# Patient Record
Sex: MALE | Race: WHITE | Employment: FULL TIME | URBAN - METROPOLITAN AREA
[De-identification: names, ages, dates, MRNs, and addresses within clinical notes are randomized per-mention and may not be internally consistent; named-entity substitution may affect disease eponyms.]

---

## 2022-07-11 ENCOUNTER — TELEPHONE (OUTPATIENT)
Dept: PRIMARY CARE CLINIC | Age: 62
End: 2022-07-11

## 2022-07-11 ENCOUNTER — NURSE TRIAGE (OUTPATIENT)
Dept: OTHER | Facility: CLINIC | Age: 62
End: 2022-07-11

## 2022-07-11 NOTE — TELEPHONE ENCOUNTER
Danielle Munson is looking for a PCP. Benito Farley recommended him to you and his wife also goes to you.  Will you accept?    [phone]

## 2022-07-11 NOTE — TELEPHONE ENCOUNTER
Received call from [facility]; Patient with Red Flag Complaint requesting to establish care with [facility]. Limited triage as patient is not with caller. Subjective: Caller, patient's wife, states \"He hasn't been feeling well for a couple of weeks. As far as the numbness it is in his R ring finger and pinky. He is also having some discomfort in his L hand. He is having a hard time playing the guitar. He is also having some back pain the past week. The past week he has not worked because he feels poorly all over. \"    Current Symptoms: numbness in R hand    Onset: couple weeks    Associated Symptoms: fatigue    Pain Severity: unable to provide but states he walks like he is in pain    Temperature: low grade fever    What has been tried: ibuprofen    LMP: NA Pregnant: NA    Recommended disposition: See in Office Today    Care advice provided, patient verbalizes understanding; denies any other questions or concerns; instructed to call back for any new or worsening symptoms. Patient/Caller agrees with recommended disposition; [facility] for [facility] scheduling    Attention Provider: Thank you for allowing me to participate in the care of your patient. The patient was connected to triage in response to information provided to the ECC. Please do not respond through this encounter as the response is not directed to a shared pool.       Reason for Disposition   Back pain (with neurologic deficit)    Protocols used: NEUROLOGIC DEFICIT-ADULT-OH

## 2022-07-12 ENCOUNTER — APPOINTMENT (OUTPATIENT)
Dept: CT IMAGING | Age: 62
DRG: 180 | End: 2022-07-12
Payer: COMMERCIAL

## 2022-07-12 ENCOUNTER — HOSPITAL ENCOUNTER (INPATIENT)
Age: 62
LOS: 3 days | Discharge: HOME | DRG: 180 | End: 2022-07-15
Attending: STUDENT IN AN ORGANIZED HEALTH CARE EDUCATION/TRAINING PROGRAM | Admitting: FAMILY MEDICINE
Payer: COMMERCIAL

## 2022-07-12 ENCOUNTER — OFFICE VISIT (OUTPATIENT)
Dept: FAMILY MEDICINE CLINIC | Age: 62
End: 2022-07-12
Payer: COMMERCIAL

## 2022-07-12 VITALS
SYSTOLIC BLOOD PRESSURE: 116 MMHG | OXYGEN SATURATION: 96 % | HEART RATE: 77 BPM | DIASTOLIC BLOOD PRESSURE: 72 MMHG | TEMPERATURE: 97.6 F | WEIGHT: 142.4 LBS

## 2022-07-12 DIAGNOSIS — C79.51 MALIGNANT NEOPLASM METASTATIC TO BONE (HCC): ICD-10-CM

## 2022-07-12 DIAGNOSIS — C34.92 LUNG CANCER, PRIMARY, WITH METASTASIS FROM LUNG TO OTHER SITE, LEFT (HCC): ICD-10-CM

## 2022-07-12 DIAGNOSIS — C79.9 METASTATIC MALIGNANT NEOPLASM, UNSPECIFIED SITE (HCC): Primary | ICD-10-CM

## 2022-07-12 DIAGNOSIS — R91.8 LUNG MASS: Primary | ICD-10-CM

## 2022-07-12 DIAGNOSIS — J18.9 PNEUMONIA DUE TO INFECTIOUS ORGANISM, UNSPECIFIED LATERALITY, UNSPECIFIED PART OF LUNG: ICD-10-CM

## 2022-07-12 DIAGNOSIS — R20.2 PARESTHESIAS: ICD-10-CM

## 2022-07-12 DIAGNOSIS — M54.12 CERVICAL RADICULOPATHY: ICD-10-CM

## 2022-07-12 DIAGNOSIS — M54.6 DORSALGIA OF THORACIC REGION: ICD-10-CM

## 2022-07-12 DIAGNOSIS — R06.02 SHORTNESS OF BREATH: ICD-10-CM

## 2022-07-12 LAB
ALBUMIN SERPL-MCNC: 3.6 G/DL (ref 3.5–5.2)
ALP BLD-CCNC: 203 U/L (ref 40–129)
ALT SERPL-CCNC: 40 U/L (ref 0–40)
ANION GAP SERPL CALCULATED.3IONS-SCNC: 12 MMOL/L (ref 7–16)
AST SERPL-CCNC: 33 U/L (ref 0–39)
BACTERIA: ABNORMAL /HPF
BASOPHILS ABSOLUTE: 0.12 E9/L (ref 0–0.2)
BASOPHILS RELATIVE PERCENT: 0.7 % (ref 0–2)
BILIRUB SERPL-MCNC: 0.6 MG/DL (ref 0–1.2)
BILIRUBIN URINE: NEGATIVE
BLOOD, URINE: ABNORMAL
BUN BLDV-MCNC: 19 MG/DL (ref 6–23)
CALCIUM SERPL-MCNC: 10 MG/DL (ref 8.6–10.2)
CHLORIDE BLD-SCNC: 97 MMOL/L (ref 98–107)
CLARITY: CLEAR
CO2: 27 MMOL/L (ref 22–29)
COLOR: YELLOW
CREAT SERPL-MCNC: 0.7 MG/DL (ref 0.7–1.2)
D DIMER: >5250 NG/ML DDU
EOSINOPHILS ABSOLUTE: 0.06 E9/L (ref 0.05–0.5)
EOSINOPHILS RELATIVE PERCENT: 0.4 % (ref 0–6)
EPITHELIAL CELLS, UA: ABNORMAL /HPF
GFR AFRICAN AMERICAN: >60
GFR NON-AFRICAN AMERICAN: >60 ML/MIN/1.73
GLUCOSE BLD-MCNC: 106 MG/DL (ref 74–99)
GLUCOSE URINE: 100 MG/DL
HCT VFR BLD CALC: 34.5 % (ref 37–54)
HEMOGLOBIN: 11.5 G/DL (ref 12.5–16.5)
IMMATURE GRANULOCYTES #: 0.12 E9/L
IMMATURE GRANULOCYTES %: 0.7 % (ref 0–5)
INR BLD: 1.3
KETONES, URINE: NEGATIVE MG/DL
LEUKOCYTE ESTERASE, URINE: NEGATIVE
LYMPHOCYTES ABSOLUTE: 2.43 E9/L (ref 1.5–4)
LYMPHOCYTES RELATIVE PERCENT: 14.4 % (ref 20–42)
MCH RBC QN AUTO: 29.9 PG (ref 26–35)
MCHC RBC AUTO-ENTMCNC: 33.3 % (ref 32–34.5)
MCV RBC AUTO: 89.6 FL (ref 80–99.9)
MONOCYTES ABSOLUTE: 1.49 E9/L (ref 0.1–0.95)
MONOCYTES RELATIVE PERCENT: 8.8 % (ref 2–12)
NEUTROPHILS ABSOLUTE: 12.69 E9/L (ref 1.8–7.3)
NEUTROPHILS RELATIVE PERCENT: 75 % (ref 43–80)
NITRITE, URINE: NEGATIVE
PDW BLD-RTO: 13.2 FL (ref 11.5–15)
PH UA: 6 (ref 5–9)
PLATELET # BLD: 351 E9/L (ref 130–450)
PMV BLD AUTO: 9.7 FL (ref 7–12)
POTASSIUM REFLEX MAGNESIUM: 4.2 MMOL/L (ref 3.5–5)
PROTEIN UA: NEGATIVE MG/DL
PROTHROMBIN TIME: 13.8 SEC (ref 9.3–12.4)
RBC # BLD: 3.85 E12/L (ref 3.8–5.8)
RBC UA: ABNORMAL /HPF (ref 0–2)
SODIUM BLD-SCNC: 136 MMOL/L (ref 132–146)
SPECIFIC GRAVITY UA: 1.02 (ref 1–1.03)
TOTAL PROTEIN: 7.9 G/DL (ref 6.4–8.3)
TROPONIN, HIGH SENSITIVITY: 39 NG/L (ref 0–11)
TROPONIN, HIGH SENSITIVITY: 42 NG/L (ref 0–11)
UROBILINOGEN, URINE: 1 E.U./DL
WBC # BLD: 16.9 E9/L (ref 4.5–11.5)
WBC UA: ABNORMAL /HPF (ref 0–5)

## 2022-07-12 PROCEDURE — 81001 URINALYSIS AUTO W/SCOPE: CPT

## 2022-07-12 PROCEDURE — 99285 EMERGENCY DEPT VISIT HI MDM: CPT

## 2022-07-12 PROCEDURE — 94664 DEMO&/EVAL PT USE INHALER: CPT

## 2022-07-12 PROCEDURE — 80053 COMPREHEN METABOLIC PANEL: CPT

## 2022-07-12 PROCEDURE — 2580000003 HC RX 258: Performed by: RADIOLOGY

## 2022-07-12 PROCEDURE — 6370000000 HC RX 637 (ALT 250 FOR IP): Performed by: FAMILY MEDICINE

## 2022-07-12 PROCEDURE — 71260 CT THORAX DX C+: CPT

## 2022-07-12 PROCEDURE — 6360000002 HC RX W HCPCS: Performed by: STUDENT IN AN ORGANIZED HEALTH CARE EDUCATION/TRAINING PROGRAM

## 2022-07-12 PROCEDURE — 85610 PROTHROMBIN TIME: CPT

## 2022-07-12 PROCEDURE — 99204 OFFICE O/P NEW MOD 45 MIN: CPT | Performed by: PHYSICIAN ASSISTANT

## 2022-07-12 PROCEDURE — 87040 BLOOD CULTURE FOR BACTERIA: CPT

## 2022-07-12 PROCEDURE — 6360000002 HC RX W HCPCS: Performed by: FAMILY MEDICINE

## 2022-07-12 PROCEDURE — 6360000004 HC RX CONTRAST MEDICATION: Performed by: RADIOLOGY

## 2022-07-12 PROCEDURE — A4216 STERILE WATER/SALINE, 10 ML: HCPCS | Performed by: FAMILY MEDICINE

## 2022-07-12 PROCEDURE — 74177 CT ABD & PELVIS W/CONTRAST: CPT

## 2022-07-12 PROCEDURE — 2580000003 HC RX 258: Performed by: STUDENT IN AN ORGANIZED HEALTH CARE EDUCATION/TRAINING PROGRAM

## 2022-07-12 PROCEDURE — 2580000003 HC RX 258: Performed by: FAMILY MEDICINE

## 2022-07-12 PROCEDURE — 94640 AIRWAY INHALATION TREATMENT: CPT

## 2022-07-12 PROCEDURE — 99223 1ST HOSP IP/OBS HIGH 75: CPT | Performed by: FAMILY MEDICINE

## 2022-07-12 PROCEDURE — C9113 INJ PANTOPRAZOLE SODIUM, VIA: HCPCS | Performed by: FAMILY MEDICINE

## 2022-07-12 PROCEDURE — 93005 ELECTROCARDIOGRAM TRACING: CPT | Performed by: PHYSICIAN ASSISTANT

## 2022-07-12 PROCEDURE — 84484 ASSAY OF TROPONIN QUANT: CPT

## 2022-07-12 PROCEDURE — 2060000000 HC ICU INTERMEDIATE R&B

## 2022-07-12 PROCEDURE — 85025 COMPLETE CBC W/AUTO DIFF WBC: CPT

## 2022-07-12 PROCEDURE — 85378 FIBRIN DEGRADE SEMIQUANT: CPT

## 2022-07-12 PROCEDURE — 6370000000 HC RX 637 (ALT 250 FOR IP): Performed by: STUDENT IN AN ORGANIZED HEALTH CARE EDUCATION/TRAINING PROGRAM

## 2022-07-12 RX ORDER — DOXYCYCLINE HYCLATE 100 MG/1
100 CAPSULE ORAL ONCE
Status: COMPLETED | OUTPATIENT
Start: 2022-07-12 | End: 2022-07-12

## 2022-07-12 RX ORDER — ONDANSETRON 2 MG/ML
4 INJECTION INTRAMUSCULAR; INTRAVENOUS EVERY 6 HOURS PRN
Status: DISCONTINUED | OUTPATIENT
Start: 2022-07-12 | End: 2022-07-15 | Stop reason: HOSPADM

## 2022-07-12 RX ORDER — SODIUM CHLORIDE 9 MG/ML
INJECTION, SOLUTION INTRAVENOUS PRN
Status: DISCONTINUED | OUTPATIENT
Start: 2022-07-12 | End: 2022-07-15 | Stop reason: HOSPADM

## 2022-07-12 RX ORDER — ACETAMINOPHEN 650 MG/1
650 SUPPOSITORY RECTAL EVERY 6 HOURS PRN
Status: DISCONTINUED | OUTPATIENT
Start: 2022-07-12 | End: 2022-07-15 | Stop reason: HOSPADM

## 2022-07-12 RX ORDER — HEPARIN SODIUM 10000 [USP'U]/ML
5000 INJECTION, SOLUTION INTRAVENOUS; SUBCUTANEOUS EVERY 8 HOURS
Status: DISCONTINUED | OUTPATIENT
Start: 2022-07-12 | End: 2022-07-15 | Stop reason: HOSPADM

## 2022-07-12 RX ORDER — IPRATROPIUM BROMIDE AND ALBUTEROL SULFATE 2.5; .5 MG/3ML; MG/3ML
1 SOLUTION RESPIRATORY (INHALATION)
Status: DISCONTINUED | OUTPATIENT
Start: 2022-07-12 | End: 2022-07-15 | Stop reason: HOSPADM

## 2022-07-12 RX ORDER — SODIUM CHLORIDE 0.9 % (FLUSH) 0.9 %
10 SYRINGE (ML) INJECTION PRN
Status: COMPLETED | OUTPATIENT
Start: 2022-07-12 | End: 2022-07-12

## 2022-07-12 RX ORDER — POLYETHYLENE GLYCOL 3350 17 G/17G
17 POWDER, FOR SOLUTION ORAL DAILY PRN
Status: DISCONTINUED | OUTPATIENT
Start: 2022-07-12 | End: 2022-07-15 | Stop reason: HOSPADM

## 2022-07-12 RX ORDER — ONDANSETRON 4 MG/1
4 TABLET, ORALLY DISINTEGRATING ORAL EVERY 8 HOURS PRN
Status: DISCONTINUED | OUTPATIENT
Start: 2022-07-12 | End: 2022-07-15 | Stop reason: HOSPADM

## 2022-07-12 RX ORDER — SODIUM CHLORIDE 0.9 % (FLUSH) 0.9 %
5-40 SYRINGE (ML) INJECTION EVERY 12 HOURS SCHEDULED
Status: DISCONTINUED | OUTPATIENT
Start: 2022-07-12 | End: 2022-07-15 | Stop reason: HOSPADM

## 2022-07-12 RX ORDER — LANOLIN ALCOHOL/MO/W.PET/CERES
3 CREAM (GRAM) TOPICAL NIGHTLY PRN
Status: DISCONTINUED | OUTPATIENT
Start: 2022-07-12 | End: 2022-07-15 | Stop reason: HOSPADM

## 2022-07-12 RX ORDER — MORPHINE SULFATE 2 MG/ML
2 INJECTION, SOLUTION INTRAMUSCULAR; INTRAVENOUS EVERY 4 HOURS PRN
Status: DISCONTINUED | OUTPATIENT
Start: 2022-07-12 | End: 2022-07-15 | Stop reason: HOSPADM

## 2022-07-12 RX ORDER — SODIUM CHLORIDE 0.9 % (FLUSH) 0.9 %
5-40 SYRINGE (ML) INJECTION PRN
Status: DISCONTINUED | OUTPATIENT
Start: 2022-07-12 | End: 2022-07-15 | Stop reason: HOSPADM

## 2022-07-12 RX ORDER — DOXYCYCLINE HYCLATE 100 MG/1
CAPSULE ORAL
Status: DISPENSED
Start: 2022-07-12 | End: 2022-07-13

## 2022-07-12 RX ORDER — ACETAMINOPHEN 325 MG/1
650 TABLET ORAL ONCE
Status: COMPLETED | OUTPATIENT
Start: 2022-07-12 | End: 2022-07-12

## 2022-07-12 RX ORDER — NICOTINE 21 MG/24HR
1 PATCH, TRANSDERMAL 24 HOURS TRANSDERMAL DAILY
Status: DISCONTINUED | OUTPATIENT
Start: 2022-07-12 | End: 2022-07-15 | Stop reason: HOSPADM

## 2022-07-12 RX ORDER — IBUPROFEN 800 MG/1
800 TABLET ORAL EVERY 6 HOURS PRN
COMMUNITY
End: 2022-07-15

## 2022-07-12 RX ORDER — ACETAMINOPHEN 325 MG/1
650 TABLET ORAL EVERY 6 HOURS PRN
Status: DISCONTINUED | OUTPATIENT
Start: 2022-07-12 | End: 2022-07-15 | Stop reason: HOSPADM

## 2022-07-12 RX ADMIN — IOPAMIDOL 50 ML: 755 INJECTION, SOLUTION INTRAVENOUS at 14:20

## 2022-07-12 RX ADMIN — Medication 10 ML: at 21:51

## 2022-07-12 RX ADMIN — Medication 3 MG: at 21:51

## 2022-07-12 RX ADMIN — SODIUM CHLORIDE, PRESERVATIVE FREE 10 ML: 5 INJECTION INTRAVENOUS at 14:17

## 2022-07-12 RX ADMIN — SODIUM CHLORIDE 40 MG: 9 INJECTION, SOLUTION INTRAMUSCULAR; INTRAVENOUS; SUBCUTANEOUS at 19:51

## 2022-07-12 RX ADMIN — IPRATROPIUM BROMIDE AND ALBUTEROL SULFATE 1 AMPULE: .5; 2.5 SOLUTION RESPIRATORY (INHALATION) at 19:27

## 2022-07-12 RX ADMIN — ACETAMINOPHEN 650 MG: 325 TABLET ORAL at 16:16

## 2022-07-12 RX ADMIN — DOXYCYCLINE HYCLATE 100 MG: 100 CAPSULE ORAL at 19:28

## 2022-07-12 RX ADMIN — WATER 1000 MG: 1 INJECTION INTRAMUSCULAR; INTRAVENOUS; SUBCUTANEOUS at 19:29

## 2022-07-12 ASSESSMENT — PAIN SCALES - GENERAL
PAINLEVEL_OUTOF10: 3
PAINLEVEL_OUTOF10: 0

## 2022-07-12 ASSESSMENT — PAIN DESCRIPTION - DESCRIPTORS: DESCRIPTORS: ACHING;DISCOMFORT

## 2022-07-12 ASSESSMENT — PAIN DESCRIPTION - LOCATION: LOCATION: BACK

## 2022-07-12 ASSESSMENT — PAIN DESCRIPTION - ORIENTATION: ORIENTATION: RIGHT;LEFT;MID
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
## 2022-07-12 NOTE — ED NOTES
Department of Emergency Medicine  FIRST PROVIDER TRIAGE NOTE             Independent MLP           7/12/22  9:40 AM EDT    Date of Encounter: 7/12/22   MRN: [id number]      HPI: Jossy Marsh is a 62 y.o. male who presents to the ED for Back Pain (urgent care found mass in lung on xray, sent from urgent care)   Patient reporting to the ED for upper back pain x3-4 weeks. Was evaluated at urgent care and noted a possible mass on chest XR. Recommended to ED for CT chest. No CP/SOB. ROS: Negative for cp or sob. PE: Gen Appearance/Constitutional: alert  CV: regular rate  Pulm: CTA bilat     Initial Plan of Care: All treatment areas with department are currently occupied. Plan to order/Initiate the following while awaiting opening in ED: labs, EKG and imaging studies.   Initiate Treatment-Testing, Proceed toTreatment Area When Bed Available for ED Attending/MLP to Continue Care    Electronically signed by SARY Patel   DD: 7/12/22         Rachel Patel  07/12/22 1136

## 2022-07-12 NOTE — ED PROVIDER NOTES
Department of Emergency Medicine   ED  Provider Note  Admit Date/RoomTime: 7/12/2022 12:31 PM  ED Room: [facility] [address]          History of Present Illness:  7/12/22, Time: 4:42 PM EDT  Chief Complaint   Patient presents with    Back Pain     urgent care found mass in lung on xray, sent from urgent care       Jacob Prasad is a 62 y.o. male presenting to the ED for CAT scan. Patient is presenting from urgent care. He went to urgent care for back pain which she has had intermittently for the past 6 to 7 weeks. Is an achy sensation and involving his entire back. Certain movements worsen it nothing seems to improve it. He denies any trauma. No numbness, tingling, weakness, incontinence or history of IV drug use. Patient denies any chest pain. He is chronically short of breath with exertion but denies any current shortness of breath. No fevers. Nonproductive cough. No abdominal pain, nausea or vomiting. Patient does admit he is a tobacco smoker. Review of Systems:   Constitutional symptoms: Denies fever  Eyes: Denies eye pain  Ears, Nose, Mouth, Throat: Denies ear pain  Cardiovascular: Denies chest pain  Respiratory: Positive for chronic shortness of breath  Gastrointestinal: Denies blood per rectum  Genitourinary: Denies hematuria  Skin: Denies rashes  Neurological: Denies headache  Musculoskeletal: Positive for back pain    --------------------------------------------- PAST HISTORY ---------------------------------------------  Past Medical History:  has no past medical history on file. Past Surgical History:  has no past surgical history on file. Social History:  reports that he has been smoking cigarettes. He has been smoking about 0.75 packs per day. He has never used smokeless tobacco. He reports current alcohol use of about 1.0 standard drink of alcohol per week. He reports that he does not use drugs. Family History: family history is not on file. . Unless otherwise noted, family history is non contributory    The patients home medications have been reviewed. Allergies: Patient has no known allergies. I have reviewed the past medical history, past surgical history, social history, and family history    ---------------------------------------------------PHYSICAL EXAM--------------------------------------    General: The patient is conversational and lying comfortably in bed. Head: Normocephalic and atraumatic. Eyes: Sclera non-icteric and no conjunctival injection  ENT: Nasal and oral membranes moist  Neck: Trachea midline. No JVD  Respiratory: Lungs clear to auscultation bilaterally. Patient speaking in full sentences. Cardiovascular: Regular rate. No pedal edema. 2+ DP pulses. 2+ radial pulses  Gastrointestinal:  Abdomen is soft and nondistended. Bowel sounds present. There is no tenderness. There is no guarding or rebound. Musculoskeletal: No deformity. No tenderness to midline spine. No step-offs or deformities. Skin: Skin warm and dry. No rashes. Neurologic: No gross motor deficits. No aphasia. Symmetric move the extremities. Sensation intact. Psychiatric: Normal affect. Not responding to internal stimuli    -------------------------------------------------- RESULTS -------------------------------------------------  I have personally reviewed all laboratory and imaging results for this patient. Results are listed below.      LABS: (Lab results interpreted by me)  Results for orders placed or performed during the hospital encounter of 07/12/22   CBC with Auto Differential   Result Value Ref Range    WBC 16.9 (H) 4.5 - 11.5 E9/L    RBC 3.85 3.80 - 5.80 E12/L    Hemoglobin 11.5 (L) 12.5 - 16.5 g/dL    Hematocrit 34.5 (L) 37.0 - 54.0 %    MCV 89.6 80.0 - 99.9 fL    MCH 29.9 26.0 - 35.0 pg    MCHC 33.3 32.0 - 34.5 %    RDW 13.2 11.5 - 15.0 fL    Platelets 604 777 - 416 E9/L    MPV 9.7 7.0 - 12.0 fL    Neutrophils % 75.0 43.0 - 80.0 %    Immature Granulocytes % 0.7 0.0 - 5.0 %    Lymphocytes % 14.4 (L) 20.0 - 42.0 %    Monocytes % 8.8 2.0 - 12.0 %    Eosinophils % 0.4 0.0 - 6.0 %    Basophils % 0.7 0.0 - 2.0 %    Neutrophils Absolute 12.69 (H) 1.80 - 7.30 E9/L    Immature Granulocytes # 0.12 E9/L    Lymphocytes Absolute 2.43 1.50 - 4.00 E9/L    Monocytes Absolute 1.49 (H) 0.10 - 0.95 E9/L    Eosinophils Absolute 0.06 0.05 - 0.50 E9/L    Basophils Absolute 0.12 0.00 - 0.20 E9/L   Comprehensive Metabolic Panel w/ Reflex to MG   Result Value Ref Range    Sodium 136 132 - 146 mmol/L    Potassium reflex Magnesium 4.2 3.5 - 5.0 mmol/L    Chloride 97 (L) 98 - 107 mmol/L    CO2 27 22 - 29 mmol/L    Anion Gap 12 7 - 16 mmol/L    Glucose 106 (H) 74 - 99 mg/dL    BUN 19 6 - 23 mg/dL    CREATININE 0.7 0.7 - 1.2 mg/dL    GFR Non-African American >60 >=60 mL/min/1.73    GFR African American >60     Calcium 10.0 8.6 - 10.2 mg/dL    Total Protein 7.9 6.4 - 8.3 g/dL    Albumin 3.6 3.5 - 5.2 g/dL    Total Bilirubin 0.6 0.0 - 1.2 mg/dL    Alkaline Phosphatase 203 (H) 40 - 129 U/L    ALT 40 0 - 40 U/L    AST 33 0 - 39 U/L   Troponin   Result Value Ref Range    Troponin, High Sensitivity 42 (H) 0 - 11 ng/L   Troponin   Result Value Ref Range    Troponin, High Sensitivity 39 (H) 0 - 11 ng/L   EKG 12 Lead   Result Value Ref Range    Ventricular Rate 67 BPM    Atrial Rate 67 BPM    P-R Interval 124 ms    QRS Duration 100 ms    Q-T Interval 404 ms    QTc Calculation (Bazett) 426 ms    P Axis 14 degrees    R Axis -46 degrees    T Axis 20 degrees   ,     RADIOLOGY:  Interpreted by Radiologist unless otherwise specified  CT CHEST W CONTRAST   Final Result   1. Large, necrotic soft tissue mass in the right lower lobe, which also   encompasses the inferior right hilum, consistent with malignancy. 2.  Metastatic mediastinal and right hilar lymphadenopathy. 3.  Tiny satellite nodules in the right lower lobe, posterior to the   previously described mass.       4.  Multitude of tiny centrilobular nodules and density suspicious for   tree-in-bud opacities in the right middle lobe. Similar, but much less   significant findings are seen in the superior segment of the right lower   lobe. Question infective bronchiolitis or other inflammatory disorder. Tumoral spread cannot be excluded. 5.  Bilateral lung nodules, as described above. It cannot be determined if   these nodules are neoplastic or inflammatory. 6.  Centrilobular emphysema. 7.  Abdominal findings discussed in full detail on the report from the   patient's CT scan of the abdomen and pelvis performed the same day. Please   refer to that transcription. 8.  Osseous metastatic disease. CT ABDOMEN PELVIS W IV CONTRAST Additional Contrast? None   Final Result   1. Large, necrotic soft tissue mass in the right lower lobe of the lungs,   which in Compazine is the inferior right hilum. This is described in full   detail on the report from the patient's CT scan of the chest performed the   same day. Please refer to that transcription. 2.  Liver metastases. 3.  A splenic infarct. 4.  Bilateral adrenal gland lesions, which may represent adenomata. Metastatic disease cannot be excluded. MRI of the adrenal glands can be   considered for further evaluation. 5.  Bilateral renal cortical cysts. 6.  Hypodense areas involving both renal parenchyma, left greater than right. These could represent bilateral infarcts without cortical rim sign, however,   pyelonephritis cannot be excluded. Metastatic disease is also in the   differential diagnosis. Clinical correlation is recommended. 7.  Colonic diverticulosis. 8.  Enlarged prostate gland. 9.  Mildly enlarged gastrohepatic lymph node.       10.  Osseous metastases, as described above.             ------------------------- NURSING NOTES AND VITALS REVIEWED ---------------------------   The nursing notes within the ED encounter and vital signs as below have been reviewed by myself  BP 92/76   Pulse 79   Temp 97.3 °F (36.3 °C)   Resp 20   Ht 5' 9\" (1.753 m)   Wt 143 lb (64.9 kg)   SpO2 96%   BMI 21.12 kg/m²     Oxygen Saturation Interpretation: Normal    The patients available past medical records and past encounters were reviewed. ------------------------------ ED COURSE/MEDICAL DECISION MAKING----------------------  Medications   cefTRIAXone (ROCEPHIN) 1,000 mg in sterile water 10 mL IV syringe (has no administration in time range)   doxycycline hyclate (VIBRAMYCIN) capsule 100 mg (has no administration in time range)   acetaminophen (TYLENOL) tablet 650 mg (650 mg Oral Given 7/12/22 1616)   iopamidol (ISOVUE-370) 76 % injection 50 mL (50 mLs IntraVENous Given 7/12/22 1420)   sodium chloride flush 0.9 % injection 10 mL (10 mLs IntraVENous Given 7/12/22 1417)       Medical Decision Making: This is a 62 y.o. male presenting to the ED for possible CAT scan. On initial presentation, the patient's vital signs are interpreted as  Hypertensive, afebrile and saturating well on room air. Based on history and physical exam, we have a broad differential.  Patient's back pain may be secondary to musculoskeletal strain or metastasis. He has no midline tenderness to palpation. No history of trauma and is distally neurovascularly intact. With the findings of prior lung mass we will assess for evaluation of this. With his smoking history and concern for lung cancer. Laboratory studies, EKG, CT of the chest and CT abdomen pelvis obtained. A 12-lead EKG was performed and interpreted by myself. The rate is 67 with normal sinus rhythm and left axis deviation. Patient has a left anterior fascicular block the HI interval is 124, QRS interval is 100, and QTC interval is 426. No acute ST elevation. T wave inversion in lead III. This is sinus rhythm with left anterior fascicular block and nonspecific abnormality. Laboratory studies show hypochloremia. Troponin elevated at 42. This will be repeated. Alkaline phosphatase increased. Patient has a leukocytosis and anemia. No prior to compare this to. CT scan shows large necrotic mass of the right lower lobe consistent with malignancy. Multiple metastatic lesions. Patient does have some opacities noted. Lung nodules. Emphysema. Liver metastasis. Splenic infarcts. Multiple other lesions. Diverticulosis. Osseous metastasis. This is interpreted by radiology. I had an extensive conversation with both the patient and his wife. They are aware of the findings. They are agreeable to admission for further evaluation. Patient does not currently feel short of breath. He does not have a primary care physician. [facility] hospitalist contacted and I spoke with Dr. Hanna Donnelly who accepted the admission. With the patient's leukocytosis and evidence of possible opacities he is started on broad-spectrum antibiotics for coverage of community-acquired pneumonia. This patient's ED course included: a personal history and physicial examination and re-evaluation prior to disposition    This patient has remained hemodynamically stable during their ED course. Consultations:  Internal medicine    The cardiac monitor revealed sinus rhythm with a heart rate in the 70s as interpreted by me. The cardiac monitor was ordered secondary to the patient's shortness of breath and to monitor the patient for dysrhythmia. CPT [id number]    Oxygen Saturation Interpretation: 96 % on room air. Normal    Counseling:   I have spoken with the patient and discussed todays results, in addition to providing specific details for the plan of care and counseling regarding the diagnosis and prognosis. Questions are answered at this time and they are agreeable with the plan.     --------------------------------- IMPRESSION AND DISPOSITION ---------------------------------    IMPRESSION  1.  [facility] 2. Pneumonia due to infectious organism, unspecified laterality, unspecified part of lung    3. Shortness of breath        DISPOSITION  Disposition: Admit to telemetry  Patient condition is fair    I, Dr. Quinn Vega, am the primary provider of record    NOTE: This report was transcribed using voice recognition software.  Every effort was made to ensure accuracy; however, inadvertent computerized transcription errors may be present        Quinn Vega MD  07/12/22 7167

## 2022-07-12 NOTE — PROGRESS NOTES
22  Yari Stringer : 1960 Sex: male  Age 62 y.o. Subjective:  Chief Complaint   Patient presents with    Hand Pain     left hand pain and numbness for the past 3 weeks    Back Pain     mid and upper back         HPI:   Yari Stringer , 62 y.o. male presents to express care for evaluation of hand pain, paresthesias, numbness    HPI  71-year-old male presents to express care for evaluation of back pain, hand pain. The patient has had the symptoms ongoing for 8 to 10 years but recently in the last couple of weeks he has noted these paresthesias to the left hand and the ring finger and little finger. The patient states that it is more of a numb sensation. At first it was waxing waning and now seems to be more of a constant. The patient states that he does have some arthritic changes to his hands. He is right-hand dominant. The patient is a . He is not been able to work for the last 2 weeks. Additionally the patient does note night sweats. The patient has lost about 30 pounds in the last 1 year with most significant weight loss here in the last 1 month about 10 pounds. The patient has had low-grade fevers every night. The patient is not having any abdominal pain. The patient does have a history of tobacco abuse and smokes about three quarters of a pack of cigarettes daily        ROS:   Unless otherwise stated in this report the patient's positive and negative responses for review of systems for constitutional, eyes, ENT, cardiovascular, respiratory, gastrointestinal, neurological, , musculoskeletal, and integument systems and related systems to the presenting problem are either stated in the history of present illness or were not pertinent or were negative for the symptoms and/or complaints related to the presenting medical problem. Positives and pertinent negatives as per HPI. All others reviewed and are negative. PMH:   History reviewed.  No pertinent past medical history. History reviewed. No pertinent surgical history. History reviewed. No pertinent family history. Medications:   No current outpatient medications on file. Allergies:   No Known Allergies    Social History:     Social History     Tobacco Use    Smoking status: Current Every Day Smoker     Packs/day: 0.75     Types: Cigarettes    Smokeless tobacco: Never Used   Substance Use Topics    Alcohol use: Yes     Alcohol/week: 1.0 standard drink     Types: 1 Standard drinks or equivalent per week    Drug use: Never       Patient lives at home. Physical Exam:     Vitals:    07/12/22 0819   BP: 116/72   Site: Right Upper Arm   Position: Sitting   Pulse: 77   Temp: 97.6 °F (36.4 °C)   TempSrc: Temporal   SpO2: 96%   Weight: 142 lb 6.4 oz (64.6 kg)       Exam:  Physical Exam  Nurses note and vital signs reviewed and patient is not hypoxic. General: The patient appears well and in no apparent distress. Patient is resting comfortably on cart. Skin: Warm, dry, no pallor noted. There is no rash noted. Head: Normocephalic, atraumatic. Eye: Normal conjunctiva  Ears, Nose, Mouth, and Throat: Oral mucosa is moist  Cardiovascular: Regular Rate and Rhythm  Respiratory: Patient is in no distress, no accessory muscle use, lungs are clear to auscultation, no wheezing, crackles or rhonchi  Back: Non-tender, no CVA tenderness bilaterally to percussion. GI: Normal bowel sounds, no tenderness to palpation, no masses appreciated. No rebound, guarding, or rigidity noted. Musculoskeletal: The patient has no evidence of calf tenderness, no pitting edema, symmetrical pulses noted bilaterally  Neurological: A&O x4, normal speech      Testing:     XR CHEST STANDARD (2 VW)    Result Date: 7/12/2022  EXAMINATION: THREE XRAY VIEWS OF THE THORACIC SPINE; 2 XRAY VIEWS OF THE CERVICAL SPINE; TWO XRAY VIEWS OF THE CHEST 7/12/2022 7:41 am COMPARISON: None.  HISTORY: ORDERING SYSTEM PROVIDED HISTORY: Dorsalgia of thoracic region TECHNOLOGIST PROVIDED HISTORY: Reason for exam:->mid back pain; ORDERING SYSTEM PROVIDED HISTORY: Cervical radiculopathy TECHNOLOGIST PROVIDED HISTORY: Reason for exam:->back pain, neck pain radiculopathy; ORDERING SYSTEM PROVIDED HISTORY: Dorsalgia of thoracic region TECHNOLOGIST PROVIDED HISTORY: Reason for exam:->mid back pain for 2-3 weeks pain down left arm FINDINGS: Chest: A mass measuring approximately 5 cm in diameter is located posterior to the right pulmonary hilum and is highly suspicious for neoplasm. Contrast-enhanced CT of the chest is recommended for further evaluation. Normal heart and pulmonary vascularity. Neither costophrenic angle is blunted. C-spine: Moderate C6-7 degenerative disc disease. No fracture or dislocation. Normal soft tissues. T-spine: Moderate multilevel degenerative disc disease. No fracture or dislocation. Right lung mass projecting posterior to the right pulmonary hilum which is quite suspicious for underlying neoplasm. Contrast-enhanced CT of the chest is recommended. Degenerative cervical and thoracic spondylosis. XR CERVICAL SPINE (2-3 VIEWS)    Result Date: 7/12/2022  EXAMINATION: THREE XRAY VIEWS OF THE THORACIC SPINE; 2 XRAY VIEWS OF THE CERVICAL SPINE; TWO XRAY VIEWS OF THE CHEST 7/12/2022 7:41 am COMPARISON: None. HISTORY: ORDERING SYSTEM PROVIDED HISTORY: Dorsalgia of thoracic region TECHNOLOGIST PROVIDED HISTORY: Reason for exam:->mid back pain; ORDERING SYSTEM PROVIDED HISTORY: Cervical radiculopathy TECHNOLOGIST PROVIDED HISTORY: Reason for exam:->back pain, neck pain radiculopathy; ORDERING SYSTEM PROVIDED HISTORY: Dorsalgia of thoracic region TECHNOLOGIST PROVIDED HISTORY: Reason for exam:->mid back pain for 2-3 weeks pain down left arm FINDINGS: Chest: A mass measuring approximately 5 cm in diameter is located posterior to the right pulmonary hilum and is highly suspicious for neoplasm.  Contrast-enhanced CT of the chest is recommended for further evaluation. Normal heart and pulmonary vascularity. Neither costophrenic angle is blunted. C-spine: Moderate C6-7 degenerative disc disease. No fracture or dislocation. Normal soft tissues. T-spine: Moderate multilevel degenerative disc disease. No fracture or dislocation. Right lung mass projecting posterior to the right pulmonary hilum which is quite suspicious for underlying neoplasm. Contrast-enhanced CT of the chest is recommended. Degenerative cervical and thoracic spondylosis. XR THORACIC SPINE (3 VIEWS)    Result Date: 7/12/2022  EXAMINATION: THREE XRAY VIEWS OF THE THORACIC SPINE; 2 XRAY VIEWS OF THE CERVICAL SPINE; TWO XRAY VIEWS OF THE CHEST 7/12/2022 7:41 am COMPARISON: None. HISTORY: ORDERING SYSTEM PROVIDED HISTORY: Dorsalgia of thoracic region TECHNOLOGIST PROVIDED HISTORY: Reason for exam:->mid back pain; ORDERING SYSTEM PROVIDED HISTORY: Cervical radiculopathy TECHNOLOGIST PROVIDED HISTORY: Reason for exam:->back pain, neck pain radiculopathy; ORDERING SYSTEM PROVIDED HISTORY: Dorsalgia of thoracic region TECHNOLOGIST PROVIDED HISTORY: Reason for exam:->mid back pain for 2-3 weeks pain down left arm FINDINGS: Chest: A mass measuring approximately 5 cm in diameter is located posterior to the right pulmonary hilum and is highly suspicious for neoplasm. Contrast-enhanced CT of the chest is recommended for further evaluation. Normal heart and pulmonary vascularity. Neither costophrenic angle is blunted. C-spine: Moderate C6-7 degenerative disc disease. No fracture or dislocation. Normal soft tissues. T-spine: Moderate multilevel degenerative disc disease. No fracture or dislocation. Right lung mass projecting posterior to the right pulmonary hilum which is quite suspicious for underlying neoplasm. Contrast-enhanced CT of the chest is recommended. Degenerative cervical and thoracic spondylosis.          Medical Decision Making:     Vital signs reviewed    Past medical history reviewed. Allergies reviewed. Medications reviewed. Patient on arrival does not appear to be in any apparent distress or discomfort. The patient has been seen and evaluated. The patient does not appear to be toxic or lethargic. The patient had x-rays obtained that show a right lung mass projecting posterior to the right pulmonary hilum which is quite suspicious for underlying neoplasm. Contrast enhanced CT of the chest is recommended. Degenerative cervical and thoracic spondylosis. Given the patient's signs and symptoms we will send the patient to the emergency department. Likely CT of the chest as well as brain and possibly C and T-spine. The patient does need close follow-up. The patient has an appointment to establish with a PCP on 7/27/2022. The patient will be directed to the emergency department for further evaluation      Clinical Impression:   Juan Lynch was seen today for hand pain and back pain. Diagnoses and all orders for this visit:    Lung mass    Cervical radiculopathy  -     XR CERVICAL SPINE (2-3 VIEWS); Future    Dorsalgia of thoracic region  -     XR CERVICAL SPINE (2-3 VIEWS); Future  -     XR THORACIC SPINE (3 VIEWS); Future  -     XR CHEST STANDARD (2 VW);  Future    Paresthesias        Go directly to the ED    SIGNATURE: Christa Fleischer III, PA-C

## 2022-07-12 NOTE — ED NOTES
Pt accidentally dropped doxy onto floor; another doxy was removed from omnicell; total of 100 mg administered to Pt     Jie Bryant RN  07/12/22 1997

## 2022-07-13 ENCOUNTER — APPOINTMENT (OUTPATIENT)
Dept: ULTRASOUND IMAGING | Age: 62
DRG: 180 | End: 2022-07-13
Payer: COMMERCIAL

## 2022-07-13 ENCOUNTER — APPOINTMENT (OUTPATIENT)
Dept: MRI IMAGING | Age: 62
DRG: 180 | End: 2022-07-13
Payer: COMMERCIAL

## 2022-07-13 PROBLEM — M54.9 BACK PAIN: Status: ACTIVE | Noted: 2022-07-13

## 2022-07-13 LAB
ADENOVIRUS BY PCR: NOT DETECTED
ALBUMIN SERPL-MCNC: 2.9 G/DL (ref 3.5–5.2)
ALP BLD-CCNC: 164 U/L (ref 40–129)
ALT SERPL-CCNC: 32 U/L (ref 0–40)
ANION GAP SERPL CALCULATED.3IONS-SCNC: 11 MMOL/L (ref 7–16)
AST SERPL-CCNC: 27 U/L (ref 0–39)
BASOPHILS ABSOLUTE: 0.08 E9/L (ref 0–0.2)
BASOPHILS RELATIVE PERCENT: 0.5 % (ref 0–2)
BILIRUB SERPL-MCNC: 0.5 MG/DL (ref 0–1.2)
BORDETELLA PARAPERTUSSIS BY PCR: NOT DETECTED
BORDETELLA PERTUSSIS BY PCR: NOT DETECTED
BUN BLDV-MCNC: 21 MG/DL (ref 6–23)
C-REACTIVE PROTEIN: 13.2 MG/DL (ref 0–0.4)
CALCIUM SERPL-MCNC: 9.4 MG/DL (ref 8.6–10.2)
CHLAMYDOPHILIA PNEUMONIAE BY PCR: NOT DETECTED
CHLORIDE BLD-SCNC: 98 MMOL/L (ref 98–107)
CO2: 24 MMOL/L (ref 22–29)
CORONAVIRUS 229E BY PCR: NOT DETECTED
CORONAVIRUS HKU1 BY PCR: NOT DETECTED
CORONAVIRUS NL63 BY PCR: NOT DETECTED
CORONAVIRUS OC43 BY PCR: NOT DETECTED
CREAT SERPL-MCNC: 0.8 MG/DL (ref 0.7–1.2)
EKG ATRIAL RATE: 67 BPM
EKG P AXIS: 14 DEGREES
EKG P-R INTERVAL: 124 MS
EKG Q-T INTERVAL: 404 MS
EKG QRS DURATION: 100 MS
EKG QTC CALCULATION (BAZETT): 426 MS
EKG R AXIS: -46 DEGREES
EKG T AXIS: 20 DEGREES
EKG VENTRICULAR RATE: 67 BPM
EOSINOPHILS ABSOLUTE: 0.1 E9/L (ref 0.05–0.5)
EOSINOPHILS RELATIVE PERCENT: 0.6 % (ref 0–6)
FOLATE: 11.5 NG/ML (ref 4.8–24.2)
GFR AFRICAN AMERICAN: >60
GFR NON-AFRICAN AMERICAN: >60 ML/MIN/1.73
GLUCOSE BLD-MCNC: 127 MG/DL (ref 74–99)
HBA1C MFR BLD: 5.4 % (ref 4–5.6)
HCT VFR BLD CALC: 27.7 % (ref 37–54)
HEMOGLOBIN: 9.4 G/DL (ref 12.5–16.5)
HUMAN METAPNEUMOVIRUS BY PCR: NOT DETECTED
HUMAN RHINOVIRUS/ENTEROVIRUS BY PCR: NOT DETECTED
IMMATURE GRANULOCYTES #: 0.16 E9/L
IMMATURE GRANULOCYTES %: 1 % (ref 0–5)
INFLUENZA A BY PCR: NOT DETECTED
INFLUENZA B BY PCR: NOT DETECTED
L. PNEUMOPHILA SEROGP 1 UR AG: NORMAL
LV EF: 58 %
LVEF MODALITY: NORMAL
LYMPHOCYTES ABSOLUTE: 2.53 E9/L (ref 1.5–4)
LYMPHOCYTES RELATIVE PERCENT: 15.5 % (ref 20–42)
MAGNESIUM: 1.9 MG/DL (ref 1.6–2.6)
MCH RBC QN AUTO: 30.4 PG (ref 26–35)
MCHC RBC AUTO-ENTMCNC: 33.9 % (ref 32–34.5)
MCV RBC AUTO: 89.6 FL (ref 80–99.9)
MONOCYTES ABSOLUTE: 1.63 E9/L (ref 0.1–0.95)
MONOCYTES RELATIVE PERCENT: 10 % (ref 2–12)
MYCOPLASMA PNEUMONIAE BY PCR: NOT DETECTED
NEUTROPHILS ABSOLUTE: 11.81 E9/L (ref 1.8–7.3)
NEUTROPHILS RELATIVE PERCENT: 72.4 % (ref 43–80)
PARAINFLUENZA VIRUS 1 BY PCR: NOT DETECTED
PARAINFLUENZA VIRUS 2 BY PCR: NOT DETECTED
PARAINFLUENZA VIRUS 3 BY PCR: NOT DETECTED
PARAINFLUENZA VIRUS 4 BY PCR: NOT DETECTED
PDW BLD-RTO: 13.3 FL (ref 11.5–15)
PHOSPHORUS: 3.1 MG/DL (ref 2.5–4.5)
PLATELET # BLD: 295 E9/L (ref 130–450)
PMV BLD AUTO: 10.1 FL (ref 7–12)
POTASSIUM REFLEX MAGNESIUM: 4.3 MMOL/L (ref 3.5–5)
PROCALCITONIN: 0.2 NG/ML (ref 0–0.08)
RBC # BLD: 3.09 E12/L (ref 3.8–5.8)
RBC # BLD: NORMAL 10*6/UL
RESPIRATORY SYNCYTIAL VIRUS BY PCR: NOT DETECTED
SARS-COV-2, PCR: NOT DETECTED
SEDIMENTATION RATE, ERYTHROCYTE: 76 MM/HR (ref 0–15)
SODIUM BLD-SCNC: 133 MMOL/L (ref 132–146)
STREP PNEUMONIAE ANTIGEN, URINE: NORMAL
TOTAL PROTEIN: 6.7 G/DL (ref 6.4–8.3)
TSH SERPL DL<=0.05 MIU/L-ACNC: 0.98 UIU/ML (ref 0.27–4.2)
VITAMIN B-12: >2000 PG/ML (ref 211–946)
WBC # BLD: 16.3 E9/L (ref 4.5–11.5)

## 2022-07-13 PROCEDURE — 84145 PROCALCITONIN (PCT): CPT

## 2022-07-13 PROCEDURE — 6370000000 HC RX 637 (ALT 250 FOR IP): Performed by: FAMILY MEDICINE

## 2022-07-13 PROCEDURE — A9577 INJ MULTIHANCE: HCPCS | Performed by: RADIOLOGY

## 2022-07-13 PROCEDURE — 83735 ASSAY OF MAGNESIUM: CPT

## 2022-07-13 PROCEDURE — 6360000002 HC RX W HCPCS: Performed by: FAMILY MEDICINE

## 2022-07-13 PROCEDURE — 86140 C-REACTIVE PROTEIN: CPT

## 2022-07-13 PROCEDURE — 93306 TTE W/DOPPLER COMPLETE: CPT

## 2022-07-13 PROCEDURE — 94640 AIRWAY INHALATION TREATMENT: CPT

## 2022-07-13 PROCEDURE — 82746 ASSAY OF FOLIC ACID SERUM: CPT

## 2022-07-13 PROCEDURE — 2060000000 HC ICU INTERMEDIATE R&B

## 2022-07-13 PROCEDURE — 36415 COLL VENOUS BLD VENIPUNCTURE: CPT

## 2022-07-13 PROCEDURE — 80053 COMPREHEN METABOLIC PANEL: CPT

## 2022-07-13 PROCEDURE — 82607 VITAMIN B-12: CPT

## 2022-07-13 PROCEDURE — 99232 SBSQ HOSP IP/OBS MODERATE 35: CPT | Performed by: NURSE PRACTITIONER

## 2022-07-13 PROCEDURE — 84100 ASSAY OF PHOSPHORUS: CPT

## 2022-07-13 PROCEDURE — 93971 EXTREMITY STUDY: CPT

## 2022-07-13 PROCEDURE — 99222 1ST HOSP IP/OBS MODERATE 55: CPT | Performed by: INTERNAL MEDICINE

## 2022-07-13 PROCEDURE — 6360000004 HC RX CONTRAST MEDICATION: Performed by: RADIOLOGY

## 2022-07-13 PROCEDURE — 83036 HEMOGLOBIN GLYCOSYLATED A1C: CPT

## 2022-07-13 PROCEDURE — 85651 RBC SED RATE NONAUTOMATED: CPT

## 2022-07-13 PROCEDURE — 85025 COMPLETE CBC W/AUTO DIFF WBC: CPT

## 2022-07-13 PROCEDURE — 0202U NFCT DS 22 TRGT SARS-COV-2: CPT

## 2022-07-13 PROCEDURE — 84443 ASSAY THYROID STIM HORMONE: CPT

## 2022-07-13 PROCEDURE — 70553 MRI BRAIN STEM W/O & W/DYE: CPT

## 2022-07-13 PROCEDURE — 2580000003 HC RX 258: Performed by: FAMILY MEDICINE

## 2022-07-13 PROCEDURE — 87449 NOS EACH ORGANISM AG IA: CPT

## 2022-07-13 RX ORDER — PANTOPRAZOLE SODIUM 40 MG/1
40 TABLET, DELAYED RELEASE ORAL
Status: DISCONTINUED | OUTPATIENT
Start: 2022-07-13 | End: 2022-07-15 | Stop reason: HOSPADM

## 2022-07-13 RX ADMIN — MORPHINE SULFATE 2 MG: 2 INJECTION, SOLUTION INTRAMUSCULAR; INTRAVENOUS at 20:21

## 2022-07-13 RX ADMIN — IPRATROPIUM BROMIDE AND ALBUTEROL SULFATE 1 AMPULE: .5; 2.5 SOLUTION RESPIRATORY (INHALATION) at 15:39

## 2022-07-13 RX ADMIN — IPRATROPIUM BROMIDE AND ALBUTEROL SULFATE 1 AMPULE: .5; 2.5 SOLUTION RESPIRATORY (INHALATION) at 19:44

## 2022-07-13 RX ADMIN — Medication 10 ML: at 11:08

## 2022-07-13 RX ADMIN — HEPARIN SODIUM 5000 UNITS: 10000 INJECTION INTRAVENOUS; SUBCUTANEOUS at 08:56

## 2022-07-13 RX ADMIN — Medication 10 ML: at 20:22

## 2022-07-13 RX ADMIN — GADOBENATE DIMEGLUMINE 13 ML: 529 INJECTION, SOLUTION INTRAVENOUS at 22:38

## 2022-07-13 RX ADMIN — PANTOPRAZOLE SODIUM 40 MG: 40 TABLET, DELAYED RELEASE ORAL at 08:57

## 2022-07-13 RX ADMIN — Medication 3 MG: at 20:21

## 2022-07-13 RX ADMIN — HEPARIN SODIUM 5000 UNITS: 10000 INJECTION INTRAVENOUS; SUBCUTANEOUS at 03:28

## 2022-07-13 RX ADMIN — IPRATROPIUM BROMIDE AND ALBUTEROL SULFATE 1 AMPULE: .5; 2.5 SOLUTION RESPIRATORY (INHALATION) at 12:26

## 2022-07-13 ASSESSMENT — PAIN SCALES - GENERAL
PAINLEVEL_OUTOF10: 0

## 2022-07-13 NOTE — PROGRESS NOTES
Arrival time: 2130  Patient admitted to room 603 from ED. Transferred from stretcher to bed  independently. Wife at bedside, who he lives with. Alert and oriented x 4. Pain level 0. PIV noted to L AC. Denies any SOB, on room air. Continent and skin intact. Oriented to room, white board, call light, and hourly rounding.

## 2022-07-13 NOTE — PROGRESS NOTES
Comprehensive Nutrition Assessment    Type and Reason for Visit:  Initial,Positive Nutrition Screen    Nutrition Recommendations/Plan:   1. Continue Regular diet and ONS, as tolerated  2. Continue inpatient monitoring     Malnutrition Assessment:  Malnutrition Status: Moderate malnutrition (07/13/22 1533)    Context:  Acute Illness     Findings of the 6 clinical characteristics of malnutrition:  Energy Intake:  No significant decrease in energy intake  Weight Loss:  Unable to assess (subjective 7% loss in last month)     Body Fat Loss:  Mild body fat loss     Muscle Mass Loss:  Mild muscle mass loss    Fluid Accumulation:  No significant fluid accumulation     Strength:  Not Performed    Nutrition Assessment:    Pt admit 2/2 lung mass with local mets seen on imaging. He has been having back pain and paresthesias UE. Pt reports 30# wt loss in last year with most recent loss 10# in last month (7%). Will add ONS to optimize nutrient intake in the setting of probable catabolic illness. Nutrition Related Findings:    [place], back pain, numbness UE, abd WDL, no edema, denied prob w/appetite or swallowing Wound Type:  (reddened chest)       Current Nutrition Intake & Therapies:    Average Meal Intake: Unable to assess (no problems reported PTA)  Average Supplements Intake: None Ordered  ADULT DIET; Regular  Diet NPO  ADULT ORAL NUTRITION SUPPLEMENT; Lunch, Dinner; Frozen Oral Supplement  ADULT ORAL NUTRITION SUPPLEMENT; Breakfast; Standard High Calorie/High Protein Oral Supplement    Anthropometric Measures:  Height: 5' 9\" (175.3 cm)  Ideal Body Weight (IBW): 160 lbs (73 kg)    Admission Body Weight: 142 lb 6.4 oz (64.6 kg) (7/12 at PCP)  Current Body Weight: 142 lb 6.4 oz (64.6 kg) (7/12 at urgent care), 89 % IBW.     Current BMI (kg/m2): 21  Usual Body Weight:  (No EMR hx actual wts but reported 30# wt loss in last year and 10# in last month (7%))                       BMI Categories: Normal Weight (BMI 18.5-24. 9)    Estimated Daily Nutrient Needs:  Energy Requirements Based On: Formula ([address])  Weight Used for Energy Requirements: Current  Energy (kcal/day):   Weight Used for Protein Requirements: Current  Protein (g/day): 85-95  Method Used for Fluid Requirements: 1 ml/kcal  Fluid (ml/day):     Nutrition Diagnosis:   · Moderate malnutrition,In context of acute illness or injury related to catabolic illness as evidenced by Criteria as identified in malnutrition assessment      Nutrition Interventions:   Food and/or Nutrient Delivery: Continue Current Diet,Start Oral Nutrition Supplement (Magic Cup BID, Ensure Enlive QD)  Nutrition Education/Counseling: No recommendation at this time  Coordination of Nutrition Care: Continue to monitor while inpatient       Goals:     Goals: PO intake 75% or greater       Nutrition Monitoring and Evaluation:   Behavioral-Environmental Outcomes: None Identified  Food/Nutrient Intake Outcomes: Food and Nutrient Intake,Supplement Intake  Physical Signs/Symptoms Outcomes: GI Status,Biochemical Data,Fluid Status or Edema,Nutrition Focused Physical Findings,Skin,Weight    Discharge Planning:     Too soon to determine     Maggie Springer RD, [address] , [place]  Contact: [phone]

## 2022-07-13 NOTE — PROGRESS NOTES
[facility] Progress Note    Admitting Date and Time: 7/12/2022 12:31 PM  Admit Dx: Shortness of breath [R06.02]  Lung mass [R91.8]  Lung cancer, primary, with metastasis from lung to other site, left [facility]) [C34.92]  [facility]ast[facility] [C79.9]  Pneumonia due to infectious organism, unspecified laterality, unspecified part of lung [J18.9]    Subjective:  Patient is being followed for Shortness of breath [R06.02]  Lung mass [R91.8]  Lung cancer, primary, with metastasis from lung to other site, left [facility]) [C34.92]  Metastatic malignant neoplasm, unspecified site ([facility] [C79.9]  Pneumonia due to infectious organism, unspecified laterality, unspecified part of lung [J18.9]     Patient awake and alert- sitting up in bed in no acute distress  Reporting he feels ok  C/o back pain  Wife at bedside  Denies sob/ cough/ chest pain      ROS: denies fever, chills, cp, sob, n/v, HA unless stated above.     pantoprazole  40 mg Oral QAM AC    sodium chloride flush  5-40 mL IntraVENous 2 times per day    nicotine  1 patch TransDERmal Daily    heparin (porcine)  5,000 Units SubCUTAneous Q8H    ipratropium-albuterol  1 ampule Inhalation Q4H WA     sodium chloride flush, 5-40 mL, PRN  sodium chloride, , PRN  ondansetron, 4 mg, Q8H PRN   Or  ondansetron, 4 mg, Q6H PRN  polyethylene glycol, 17 g, Daily PRN  acetaminophen, 650 mg, Q6H PRN   Or  acetaminophen, 650 mg, Q6H PRN  morphine, 2 mg, Q4H PRN  melatonin, 3 mg, Nightly PRN  perflutren lipid microspheres, 1.5 mL, ONCE PRN         Objective:    /69   Pulse 87   Temp 98.2 °F (36.8 °C) (Oral)   Resp 16   Ht 5' 9\" (1.753 m)   Wt 143 lb (64.9 kg)   SpO2 94%   BMI 21.12 kg/m²   General Appearance: alert and oriented to person, place and time and in no acute distress  Skin: warm and dry  Head: normocephalic and atraumatic  Neck: neck supple and non tender without mass   Pulmonary/Chest: clear to auscultation bilaterally  Cardiovascular: normal rate, normal S1 and S2 and no carotid bruits  Abdomen: soft, non-tender, non-distended, normal bowel sounds, no masses or organomegaly  Extremities: no cyanosis, no clubbing and no edema  Neurologic: speech normal       Recent Labs     07/12/22  1303 07/13/22  0330    133   K 4.2 4.3   CL 97* 98   CO2 27 24   BUN 19 21   CREATININE 0.7 0.8   GLUCOSE 106* 127*   CALCIUM 10.0 9.4       Recent Labs     07/12/22  1303 07/13/22  0330   WBC 16.9* 16.3*   RBC 3.85 3.09*   HGB 11.5* 9.4*   HCT 34.5* 27.7*   MCV 89.6 89.6   MCH 29.9 30.4   MCHC 33.3 33.9   RDW 13.2 13.3    295   MPV 9.7 10.1         Assessment:    Principal Problem:    Lung mass  Active Problems:    Lung cancer, primary, with metastasis from lung to other site, left [facility])  Resolved Problems:    * No resolved hospital problems. *      Plan:  1. New Lung mass with local metastasis: Patient presented to the ER with back pain, numbness in fingers, and weight loss. Reporting symptoms ongoing for 6-7 weeks. Reporting low grade temps at home. Imaging in ER revealed a new lung mass. CT lungs: large necrotic soft tissue mass in the right lower lobe, which also encompasses the inferior right hilum- consistent with malignancy. Metastatic mediastinal and right hilar lymphadenopathy. Pulmonology consulted. Pt in need of tissue biopsy- will await input bronchoscopy vs IR guided biopsy. Oncology consulted. Feeling ok. C/o back pain. 2. Paresthesias: calcium borderline-Check ionized calcium and PTH. Check mg/ phos/ b12. Ultrasound to r/o DVT ordered. 3. Weight loss: likely related to #1. Hga1c within normal range. Thyroid levels ok. No concern for DM/ thyroid disease contributing. 4. Elevated troponin: cardiology consulted. Troponin 4--> 39. ? Echo - defer to cardiology    5. Tobacco use: nicotine patch. Recommend cecssation.      6. Leukocytosis: monitor- check procal.        Time spent reviewing chart, clinical exam, discussing case and answering questions with staff/consultants/patient/family =20 minutes     NOTE: This report was transcribed using voice recognition software. Every effort was made to ensure accuracy; however, inadvertent computerized transcription errors may be present.   Electronically signed by JACOBO Rojas on 7/13/2022 at 8:38 AM

## 2022-07-13 NOTE — CONSULTS
Scott Khan M.D.,[facility]  Georgie Hernandez D.O., F.A.C.O.I., Judi Dowell M.D. Apryl Sol M.D. Elisabeth Venegas D.O. Patient:  Esperanza Salazar 62 y.o. male MRN: [id number]     Date of Service: 7/13/2022      PULMONARY CONSULTATION    Reason for Consultation: Lung cancer with mets (new)  Referring Physician: Dr. Basilia Phan MD    Communication with the referring physician will be sent via the electronic medical record. Chief Complaint: Back pain    CODE STATUS: Full    SUBJECTIVE:  HPI:  Esperanza Salazar is a 62 y.o. male who we are asked to evaluate for lung cancer with metastasis-new. There is no pertinent medical history. He is a current every day smoker 0.75 packs/day x40 years. No formal history of COPD or asthma. He was sent from urgent care appointment yesterday 7/12/2022 for complaint of back pain for 5 weeks. He was requiring CT scan to be completed. He denies any trauma. Positive numbness, tingling in his fingers. Denies lower extremity weakness, incontinence. Admits to left leg cramping. Patient denies any chest pain. No fevers. He admits to a chronic cough not productive of sputum. No abdominal pain, nausea or vomiting. Decreased appetite with unintentional weight loss-20 pounds. Radiology review- chest x-ray with right lung mass 5 cm posterior to right pulmonary hilum. CT chest with contrast-mediastinal lymphadenopathy involving paratracheal and subcarinal nodes. Enlarged right hilar and suprahilar lymph nodes. Large necrotic soft tissue mass identified in the right lower lobe encompassing the inferior right hilum. 9 x 2 x 5.6 cm, spiculated in appearance. Subcentimeter satellite nodules noted in the right lower lobe. Tree-in-bud opacities identified in the right middle lobe. Ill-defined 0.7 cm right upper lobe nodule. Punctate left lower lobe nodules. Lytic lesions involving right second and ninth as well as third rib.   Largest second rib measuring 3.1 x 2.9 cm consistent with osseous metastatic disease. Other lytic lesions involving multiple cervical thoracic and lumbar spine vertebral bodies identified on imaging. CT abdomen and pelvis solid small hypodense lesions throughout the liver largest up to 2.4 cm. Splenic infarct, bilateral adrenal gland lesions also noted. Lab testing- sodium 133, potassium 4.3, BUN 21 creatinine 0.8, high-sensitivity troponin 42> 39, T bili 0.5, ALT 32 AST 27, TSH 0.98, WBC 16.3, hemoglobin 9.4. D-dimer> 5250, INR 1.3, blood cultures pending. CRP elevated 13.2. Procalcitonin pending. Recent return from 2D echo completed. Not requiring supplemental oxygen. Wife at bedside questions answered. Patient is agreeable to undergo CT-guided biopsy of liver lesions for tissue diagnosis. History reviewed. No pertinent past medical history. History reviewed. No pertinent surgical history. History reviewed. No pertinent family history. Father ()- Sister age 62-brother- all with lung cancer    Social History:   Social History     Socioeconomic History    Marital status:      Spouse name: Not on file    Number of children: Not on file    Years of education: Not on file    Highest education level: Not on file   Occupational History    Not on file   Tobacco Use    Smoking status: Current Every Day Smoker     Packs/day: 0.75     Types: Cigarettes    Smokeless tobacco: Never Used   Substance and Sexual Activity    Alcohol use:  Yes     Alcohol/week: 1.0 standard drink     Types: 1 Standard drinks or equivalent per week    Drug use: Never    Sexual activity: Not on file   Other Topics Concern    Not on file   Social History Narrative    Not on file     Social Determinants of Health     Financial Resource Strain:     Difficulty of Paying Living Expenses: Not on file   Food Insecurity:     Worried About Running Out of Food in the Last Year: Not on file    Rowan of Food in the Last Year: Not on file   Transportation Needs:     Lack of Transportation (Medical): Not on file    Lack of Transportation (Non-Medical): Not on file   Physical Activity:     Days of Exercise per Week: Not on file    Minutes of Exercise per Session: Not on file   Stress:     Feeling of Stress : Not on file   Social Connections:     Frequency of Communication with Friends and Family: Not on file    Frequency of Social Gatherings with Friends and Family: Not on file    Attends [employer] Services: Not on file    Active Member of [address] [place] [address] [employer] or Organizations: Not on file    Attends Club or Organization Meetings: Not on file    Marital Status: Not on file   Intimate Partner Violence:     Fear of Current or Ex-Partner: Not on file    Emotionally Abused: Not on file    Physically Abused: Not on file    Sexually Abused: Not on file   Housing Stability:     Unable to Pay for Housing in the Last Year: Not on file    Number of [place] in the Last Year: Not on file    Unstable Housing in the Last Year: Not on file     Smoking history: The patient is an active smoker of cigarettes 0.75 packs/day    ETOH:   reports current alcohol use of about 1.0 standard drink of alcohol per week. Exposures: There is no known exposure to TB or asbestos. No IV or recreational drug use. No travel history. No exotic animals turtles or birds at home. Vaccines: There is no immunization history on file for this patient.      Home Meds: Medications Prior to Admission: ibuprofen (ADVIL;MOTRIN) 800 MG tablet, Take 800 mg by mouth every 6 hours as needed for Pain    CURRENT MEDS :  Scheduled Meds:   pantoprazole  40 mg Oral QAM AC    sodium chloride flush  5-40 mL IntraVENous 2 times per day    nicotine  1 patch TransDERmal Daily    heparin (porcine)  5,000 Units SubCUTAneous Q8H    ipratropium-albuterol  1 ampule Inhalation Q4H WA       Continuous Infusions:   sodium chloride         No Known Allergies    REVIEW OF SYSTEMS:  Constitutional: Denies fever, weight loss, night sweats, and fatigue  Skin: Denies pigmentation, dark lesions, and rashes   HEENT: Denies hearing loss, tinnitus, ear drainage, epistaxis, sore throat, and hoarseness. Cardiovascular: Denies palpitations, chest pain, and chest pressure. Respiratory: Denies cough, dyspnea at rest, hemoptysis, apnea, and choking. Gastrointestinal: Denies nausea, vomiting, poor appetite, diarrhea, heartburn or reflux  Genitourinary: Denies dysuria, frequency, urgency or hematuria  Musculoskeletal: Back pain , hand numbness, left leg cramping  Neurological: Denies dizziness, vertigo, headache, and focal weakness  Psychological: Denies anxiety and depression  Endocrine: Denies heat intolerance and cold intolerance  Hematopoietic/Lymphatic: Denies bleeding problems and blood transfusions    OBJECTIVE:   /69   Pulse 87   Temp 98.2 °F (36.8 °C) (Oral)   Resp 16   Ht 5' 9\" (1.753 m)   Wt 143 lb (64.9 kg)   SpO2 94%   BMI 21.12 kg/m²   SpO2 Readings from Last 1 Encounters:   07/13/22 94%        I/O:  No intake or output data in the 24 hours ending 07/13/22 0831                   Physical Exam:  General: The patient is lying in bed comfortably without any distress. Breathing is not labored  HEENT: Pupils are equal round and reactive to light, there are no oral lesions and no post-nasal drip .nodule felt near supraclavicular area neck posteriorly  Neck: supple without adenopathy  Cardiovascular: regular rate and rhythm without murmur or gallop  Respiratory: Clear to auscultation bilaterally without wheezing or crackles.   Air entry is symmetric  Abdomen: soft, non-tender, non-distended, normal bowel sounds  Extremities: warm,   no clubbing left leg cramping  Skin: no rash or lesion  Neurologic: CN II-XII grossly intact, no focal deficits    Pulmonary Function Testing none on file      Imaging personally reviewed:  CTA chest  1.  Large, necrotic soft tissue mass in the right lower lobe, which also   encompasses the inferior right hilum, consistent with malignancy.       2.  Metastatic mediastinal and right hilar lymphadenopathy.       3.  Tiny satellite nodules in the right lower lobe, posterior to the   previously described mass.       4.  Multitude of tiny centrilobular nodules and density suspicious for   tree-in-bud opacities in the right middle lobe.  Similar, but much less   significant findings are seen in the superior segment of the right lower   lobe.  Question infective bronchiolitis or other inflammatory disorder. Tumoral spread cannot be excluded.       5.  Bilateral lung nodules, as described above.  It cannot be determined if   these nodules are neoplastic or inflammatory.       6.  Centrilobular emphysema.       7.  Abdominal findings discussed in full detail on the report from the   patient's CT scan of the abdomen and pelvis performed the same day.  Please   refer to that transcription.       8.  Osseous metastatic disease. CT abdomen and pelvis  Impression   1.  Large, necrotic soft tissue mass in the right lower lobe of the lungs,   which in Comp[place] is the inferior right hilum.  This is described in full   detail on the report from the patient's CT scan of the chest performed the   same day.  Please refer to that transcription.       2.  Liver metastases.       3.  A splenic infarct.       4.  Bilateral adrenal gland lesions, which may represent adenomata. Metastatic disease cannot be excluded.  MRI of the adrenal glands can be   considered for further evaluation.       5.  Bilateral renal cortical cysts.       6.  Hypodense areas involving both renal parenchyma, left greater than right. These could represent bilateral infarcts without cortical rim sign, however,   pyelonephritis cannot be excluded.  Metastatic disease is also in the   differential diagnosis.  Clinical correlation is recommended.       7.  Colonic diverticulosis.       8.  Enlarged prostate gland.       9.  Mildly enlarged gastrohepatic lymph node.       10.  Osseous metastases, as described above.           Echo:  None    Labs:  Lab Results   Component Value Date/Time    WBC 16.3 07/13/2022 03:30 AM    HGB 9.4 07/13/2022 03:30 AM    HCT 27.7 07/13/2022 03:30 AM    MCV 89.6 07/13/2022 03:30 AM    MCH 30.4 07/13/2022 03:30 AM    MCHC 33.9 07/13/2022 03:30 AM    RDW 13.3 07/13/2022 03:30 AM     07/13/2022 03:30 AM    MPV 10.1 07/13/2022 03:30 AM     Lab Results   Component Value Date/Time     07/13/2022 03:30 AM    K 4.3 07/13/2022 03:30 AM    CL 98 07/13/2022 03:30 AM    CO2 24 07/13/2022 03:30 AM    BUN 21 07/13/2022 03:30 AM    CREATININE 0.8 07/13/2022 03:30 AM    LABALBU 2.9 07/13/2022 03:30 AM    CALCIUM 9.4 07/13/2022 03:30 AM    GFRAA >60 07/13/2022 03:30 AM    LABGLOM >60 07/13/2022 03:30 AM     Lab Results   Component Value Date/Time    PROTIME 13.8 07/12/2022 07:13 PM    INR 1.3 07/12/2022 07:13 PM     No results for input(s): PROBNP in the last 72 hours. No results for input(s): TROPONINI in the last 72 hours. No results for input(s): PROCAL in the last 72 hours. This SmartLink has not been configured with any valid records. Micro:  No results for input(s): CULTRESP in the last 72 hours. No results for input(s): LABGRAM in the last 72 hours. No results for input(s): LEGUR in the last 72 hours. No results for input(s): STREPNEUMAGU in the last 72 hours. No results for input(s): LP1UAG in the last 72 hours. Assessment:  1. Right lung mass with widespread metastasis to liver and bones  2. Mediastinal, right hilar lymphadenopathy  3. Strong family history of lung cancer- father, sister, brother  3. Tree-in-bud changes on CT chest.   5. Ongoing nicotine dependence 0.75 packs/day x 40 years  6. Leukocytosis  7. Elevated D-dimer  8. Elevated troponin  9. Unintentional weight loss-20 pounds    Plan:  1.  Monitor off oxygen keep SPO2 greater than 92%  2. Check inflammatory markers. Procal, crp, sed rate, strep pneumo, legionella Ag's. Blood cultures pending. Monitor WBCs- monitor off antibiotics  3. CT chest with contrast reviewed, discussed with radiology- study limited due to suboptimal opacification of pulmonary arteries. No evidence to suggest PE.  4. Ultrasound L leg to assess for DVT  5. IR procedure ct guided biopsy of liver lesion. NPO after MN, INR 1. 3. hold DVT prophylaxis  6. 2D echo completed  7. Oncology consult placed, work-up ongoing  8. Tobacco cessation counseling    Thank you for allowing me to participate in the care of [employer]. Please feel free to call with questions. This plan of care was reviewed in collaboration with Dr. Palmira Mullen    Electronically signed by ARA Braun CNP on 7/13/2022 at 8:31 AM      Note: This report was completed utilizing computer voice recognition software. Every effort has been made to ensure accuracy, however; inadvertent computerized transcription errors may be present    I personally saw, examined, and cared for the patient. Labs, medications, radiographs reviewed. I agree with history exam and plans detailed in NP note with the following additions:    Mr. Ham Terrazas is seen for a large necrotic lung mass with mediastinal/hilar adenopathy with liver metastasis likely representing primary bronchogenic carcinoma. He has had 2-3 family members diagnosed with early lung cancer and has an extensive smoking history. I would recommend CT guided biopsy of the liver lesions. US the L leg given calf pain to exclude DVT.     Pinky Guzman MD

## 2022-07-13 NOTE — PLAN OF CARE
Problem: Discharge Planning  Goal: Discharge to home or other facility with appropriate resources  Outcome: Progressing  Flowsheets (Taken 7/12/2022 2130)  Discharge to home or other facility with appropriate resources:   Identify barriers to discharge with patient and caregiver   Arrange for needed discharge resources and transportation as appropriate     Problem: Safety - Adult  Goal: Free from fall injury  Outcome: Progressing

## 2022-07-13 NOTE — PROGRESS NOTES
Notification of IV TO PO conversion  This patient's order for protonix IV hs been changed to protonix oral as approved by the pharmacy & therapeutics and Medical Executive Committees

## 2022-07-13 NOTE — ED NOTES
SBAR report faxed to 6th floor, verified receipt with staff.      Leonarda Enrique, LOUISA  07/12/22 2261

## 2022-07-13 NOTE — CONSULTS
CHIEF COMPLAINT:    HISTORY OF PRESENT ILLNESS: Patient is a 62 y.o. male seen at the request of Caprice Llanes MD and no previously followed by Cardiology. He presented with myalgias and paresthesias, found to have a large necrotic lung mass with multiple metastasis. Cardiology was consulted for EKG with findings of Left Anterior Fascicular Block along with elevated troponin. Pt currently denies any chest pain, SOB. States he does not get CP or SOB with exertion, can exercise without any limitations. Denies any previous cardiac history, has never seen a cardiologist. Denies any palpitations or peripheral edema. History reviewed. No pertinent past medical history.     Patient Active Problem List   Diagnosis    Lung mass    Lung cancer, primary, with metastasis from lung to other site, left [facility])    Metastatic malignant neoplasm ([facility] [address]    Shortness of breath    Tobacco use    Paresthesias       No Known Allergies    Current Facility-Administered Medications   Medication Dose Route Frequency Provider Last Rate Last Admin    pantoprazole (PROTONIX) tablet 40 mg  40 mg Oral QAM AC Gail Noriega MD        sodium chloride flush 0.9 % injection 5-40 mL  5-40 mL IntraVENous 2 times per day Gail Noriega MD   10 mL at 07/12/22 2151    sodium chloride flush 0.9 % injection 5-40 mL  5-40 mL IntraVENous PRN Gail Noriega MD        0.9 % sodium chloride infusion   IntraVENous PRN Gail Nroiega MD        ondansetron (ZOFRAN-ODT) disintegrating tablet 4 mg  4 mg Oral Q8H PRN Gail Noriega MD        Or    ondansetron [facility]) injection 4 mg  4 mg IntraVENous Q6H PRN Gail Noriega MD        polyethylene glycol (GLYCOLAX) packet 17 g  17 g Oral Daily PRN Gail Noriega MD        acetaminophen (TYLENOL) tablet 650 mg  650 mg Oral Q6H PRN Gail Noriega MD        Or    acetaminophen (TYLENOL) suppository 650 mg  650 mg Rectal Q6H PRN Gail Noriega MD        nicotine (Roel Reas) 21 MG/24HR 1 patch  1 patch TransDERmal Daily Benjamin Hinojosa MD        heparin (porcine) injection 5,000 Units  5,000 Units SubCUTAneous Elsy Hurst MD   5,000 Units at 07/13/22 0328    ipratropium-albuterol (DUONEB) nebulizer solution 1 ampule  1 ampule Inhalation Q4H WA Benjamin Hinojosa MD   1 ampule at 07/12/22 1927    morphine (PF) injection 2 mg  2 mg IntraVENous Q4H PRN Benjamin Hinojosa MD        melatonin tablet 3 mg  3 mg Oral Nightly PRN Benjamin Hinojosa MD   3 mg at 07/12/22 2151    perflutren lipid microspheres (DEFINITY) injection 1.65 mg  1.5 mL IntraVENous ONCE PRN Benjamin Hinojosa MD           Social History     Socioeconomic History    Marital status:      Spouse name: Not on file    Number of children: Not on file    Years of education: Not on file    Highest education level: Not on file   Occupational History    Not on file   Tobacco Use    Smoking status: Current Every Day Smoker     Packs/day: 0.75     Types: Cigarettes    Smokeless tobacco: Never Used   Substance and Sexual Activity    Alcohol use: Yes     Alcohol/week: 1.0 standard drink     Types: 1 Standard drinks or equivalent per week    Drug use: Never    Sexual activity: Not on file   Other Topics Concern    Not on file   Social History Narrative    Not on file     Social Determinants of Health     Financial Resource Strain:     Difficulty of Paying Living Expenses: Not on file   Food Insecurity:     Worried About Running Out of Food in the Last Year: Not on file    Rowan of Food in the Last Year: Not on file   Transportation Needs:     Lack of Transportation (Medical): Not on file    Lack of Transportation (Non-Medical):  Not on file   Physical Activity:     Days of Exercise per Week: Not on file    Minutes of Exercise per Session: Not on file   Stress:     Feeling of Stress : Not on file   Social Connections:     Frequency of Communication with Friends and Family: Not on file    Frequency of Social Gatherings with Friends and Family: Not on file    Attends Religion Services: Not on file    Active Member of Clubs or Organizations: Not on file    Attends Club or Organization Meetings: Not on file    Marital Status: Not on file   Intimate Partner Violence:     Fear of Current or Ex-Partner: Not on file    Emotionally Abused: Not on file    Physically Abused: Not on file    Sexually Abused: Not on file   Housing Stability:     Unable to Pay for Housing in the Last Year: Not on file    Number of [place] in the Last Year: Not on file    Unstable Housing in the Last Year: Not on file       History reviewed. No pertinent family history. Review of Systems:   Heart: as above   Lungs: as above   Eyes: denies changes in vision or discharge. Ears: denies changes in hearing or pain. Nose: denies epistaxis or masses   Throat: denies sore throat or trouble swallowing. Neuro: endorses numbness, tingling, tremors. Skin: denies rashes or itching. : denies hematuria, dysuria   GI: denies vomiting, diarrhea   Psych: denies mood changed, anxiety, depression. all others negative. Physical Exam   /69   Pulse 87   Temp 98.2 °F (36.8 °C) (Oral)   Resp 16   Ht 5' 9\" (1.753 m)   Wt 143 lb (64.9 kg)   SpO2 94%   BMI 21.12 kg/m²   Constitutional: Oriented to person, place, and time. Cachectic with visible clavicles grooves, and bitemporal wasting. No distress. Head: Normocephalic and atraumatic. Eyes: EOM are normal. Pupils are equal, round, and reactive to light. Neck: Normal range of motion. Neck supple. No hepatojugular reflux and no JVD present. Carotid bruit is not present. No tracheal deviation present. No thyromegaly present. Cardiovascular: Normal rate, regular rhythm, normal heart sounds and intact distal pulses. Exam reveals no gallop and no friction rub. No murmur heard. Pulmonary/Chest: Effort normal and breath sounds normal. No respiratory distress.  No wheezes. No rales. No tenderness. Abdominal: Soft. Bowel sounds are normal. No distension and no mass. No tenderness. No rebound and no guarding. Musculoskeletal: Normal range of motion. No edema and no tenderness. Lymphadenopathy:   No cervical adenopathy. No groin adenopathy. Neurological: Alert and oriented to person, place, and time. Skin: Skin is warm and dry. No rash noted. Not diaphoretic. No erythema. Psychiatric: Normal mood and affect. Behavior is normal.     CBC:   Lab Results   Component Value Date/Time    WBC 16.3 07/13/2022 03:30 AM    RBC 3.09 07/13/2022 03:30 AM    HGB 9.4 07/13/2022 03:30 AM    HCT 27.7 07/13/2022 03:30 AM    MCV 89.6 07/13/2022 03:30 AM    MCH 30.4 07/13/2022 03:30 AM    MCHC 33.9 07/13/2022 03:30 AM    RDW 13.3 07/13/2022 03:30 AM     07/13/2022 03:30 AM    MPV 10.1 07/13/2022 03:30 AM     BMP:   Lab Results   Component Value Date/Time     07/13/2022 03:30 AM    K 4.3 07/13/2022 03:30 AM    CL 98 07/13/2022 03:30 AM    CO2 24 07/13/2022 03:30 AM    BUN 21 07/13/2022 03:30 AM    LABALBU 2.9 07/13/2022 03:30 AM    CREATININE 0.8 07/13/2022 03:30 AM    CALCIUM 9.4 07/13/2022 03:30 AM    GFRAA >60 07/13/2022 03:30 AM    LABGLOM >60 07/13/2022 03:30 AM     Magnesium:  No results found for: MG  Cardiac Enzymes:   Lab Results   Component Value Date    TROPHS 39 (H) 07/12/2022    TROPHS 42 (H) 07/12/2022      PT/INR:    Lab Results   Component Value Date/Time    PROTIME 13.8 07/12/2022 07:13 PM    INR 1.3 07/12/2022 07:13 PM     TSH:    Lab Results   Component Value Date/Time    TSH 0.982 07/13/2022 03:30 AM       Rhythm Strip: normal sinus rhythm, there are no previous tracings available for comparison. EKG:  normal sinus rhythm, incomplete LBBB, there are no previous tracings available for comparison. ASSESSMENT AND PLAN:    1. Elevated Troponin, no chest pain, troponin stable  2. Lung Mass with metastatic disease  3. Leukocytosis  4.  Tobacco Use Disorder    - Obtain Echo  - Management per primary team and other consultants       NOTE: This report was transcribed using voice recognition software. Every effort was made to ensure accuracy; however, inadvertent computerized transcription errors may be present.

## 2022-07-13 NOTE — CONSULTS
[facility]  Hematology/Oncology  Consult      Patient Name: Yari Stringer  YOB: 1960  PCP: No primary care provider on file. Referring Provider:      Reason for Consultation:   Chief Complaint   Patient presents with    Back Pain     urgent care found mass in lung on xray, sent from urgent care        History of Present Illness: This is a 58-year-old male patient a significant smoking history who presented to the ED for evaluation of who presented to the emergency room for evaluation of back pain, arthritis and paresthesias of his right third and left fourth and fifth fingers. CT chest with large necrotic soft tissue mass in the RLL encompasses in the inferior R hilum. Metastatic mediastinal and R hilar lymphadenopathy. Tiny satellite nodules in the RLL. Multitude of tiny centrilobular nodules and density suspicious for tree-in-bud opacities in the RML. BL lung nodules. Osseous metastatic disease. CT A/P large necrotic soft tissue mass in the RLL of the lungs which encompasses the inferior R hilum. Liver metastases with a splenic infarct. BL adrenal gland lesions. Hypodense areas involving both renal parenchyma L>R. Diverticulosis. Enlarged prostate gland. Mildly enlarged gastrohepatic LN. Osseous metastases. Cervical and thoracic spine XR with R lung mass. Degenerative cervical and thoracic spondylosis. Cardiology following for elevated troponin. ECHO with EF at 55-60%. Pulmonology following. Patient is for CT guided biopsy of liver lesion. LFT's with alk phos 164 otherwise unremarkable. CBC with WBC 16.3 hgb 9.4, and platelets 011. Consultation for evaluation of new lung mass with metastatic disease. Diagnostic Data:     History reviewed. No pertinent past medical history.     Patient Active Problem List    Diagnosis Date Noted    Lung mass 07/12/2022    Lung cancer, primary, with metastasis from lung to other site, [facility] 07/12/2022    Metastatic malignant neoplasm ([facility] [address]     Shortness of breath     Tobacco use     Paresthesias         History reviewed. No pertinent surgical history. Family History  History reviewed. No pertinent family history. Social History    TOBACCO:   reports that he has been smoking cigarettes. He has been smoking about 0.75 packs per day. He has never used smokeless tobacco.  ETOH:   reports current alcohol use of about 1.0 standard drink of alcohol per week. Home Medications  Prior to Admission medications    Medication Sig Start Date End Date Taking? Authorizing Provider   ibuprofen (ADVIL;MOTRIN) 800 MG tablet Take 800 mg by mouth every 6 hours as needed for Pain   Yes Historical Provider, MD       Allergies  No Known Allergies    Review of Systems: Generalized weakness and back pain. 20 lb weight loss in 3 months. Objective  /69   Pulse 87   Temp 98.2 °F (36.8 °C) (Oral)   Resp 16   Ht 5' 9\" (1.753 m)   Wt 143 lb (64.9 kg)   SpO2 94%   BMI 21.12 kg/m²     Physical Exam:   Performance Status:  General: AAO to person, place, time, in no acute distress,   Head and neck : PERRLA, EOMI . Sclera non icteric. Oropharynx : Clear  Neck: no JVD,  no adenopathy  Heart: Regular rate and regular rhythm, no murmur  Lungs: Clear to auscultation   Extremities: No edema,no cyanosis, no clubbing. Abdomen: Soft, non-tender;no masses, no organomegaly  Skin:  No rash. Neurologic:Cranial nerves grossly intact. No focal motor or sensory deficits .     Recent Laboratory Data-   Lab Results   Component Value Date    WBC 16.3 (H) 07/13/2022    HGB 9.4 (L) 07/13/2022    HCT 27.7 (L) 07/13/2022    MCV 89.6 07/13/2022     07/13/2022    LYMPHOPCT 15.5 (L) 07/13/2022    RBC 3.09 (L) 07/13/2022    MCH 30.4 07/13/2022    MCHC 33.9 07/13/2022    RDW 13.3 07/13/2022    NEUTOPHILPCT 72.4 07/13/2022    MONOPCT 10.0 07/13/2022    BASOPCT 0.5 07/13/2022    NEUTROABS 11.81 (H) 07/13/2022    LYMPHSABS 2.53 07/13/2022    MONOSABS 1.63 (H) 07/13/2022 EOSABS 0.10 07/13/2022    BASOSABS 0.08 07/13/2022       Lab Results   Component Value Date     07/13/2022    K 4.3 07/13/2022    CL 98 07/13/2022    CO2 24 07/13/2022    BUN 21 07/13/2022    CREATININE 0.8 07/13/2022    GLUCOSE 127 (H) 07/13/2022    CALCIUM 9.4 07/13/2022    PROT 6.7 07/13/2022    LABALBU 2.9 (L) 07/13/2022    BILITOT 0.5 07/13/2022    ALKPHOS 164 (H) 07/13/2022    AST 27 07/13/2022    ALT 32 07/13/2022    LABGLOM >60 07/13/2022    GFRAA >60 07/13/2022       No results found for: IRON, TIBC, FERRITIN        Radiology-    XR CHEST STANDARD (2 VW)    Result Date: 7/12/2022  EXAMINATION: THREE XRAY VIEWS OF THE THORACIC SPINE; 2 XRAY VIEWS OF THE CERVICAL SPINE; TWO XRAY VIEWS OF THE CHEST 7/12/2022 7:41 am COMPARISON: None. HISTORY: ORDERING SYSTEM PROVIDED HISTORY: Dorsalgia of thoracic region TECHNOLOGIST PROVIDED HISTORY: Reason for exam:->mid back pain; ORDERING SYSTEM PROVIDED HISTORY: Cervical radiculopathy TECHNOLOGIST PROVIDED HISTORY: Reason for exam:->back pain, neck pain radiculopathy; ORDERING SYSTEM PROVIDED HISTORY: Dorsalgia of thoracic region TECHNOLOGIST PROVIDED HISTORY: Reason for exam:->mid back pain for 2-3 weeks pain down left arm FINDINGS: Chest: A mass measuring approximately 5 cm in diameter is located posterior to the right pulmonary hilum and is highly suspicious for neoplasm. Contrast-enhanced CT of the chest is recommended for further evaluation. Normal heart and pulmonary vascularity. Neither costophrenic angle is blunted. C-spine: Moderate C6-7 degenerative disc disease. No fracture or dislocation. Normal soft tissues. T-spine: Moderate multilevel degenerative disc disease. No fracture or dislocation. Right lung mass projecting posterior to the right pulmonary hilum which is quite suspicious for underlying neoplasm. Contrast-enhanced CT of the chest is recommended. Degenerative cervical and thoracic spondylosis.      XR CERVICAL SPINE (2-3 VIEWS)    Result Date: 7/12/2022  EXAMINATION: THREE XRAY VIEWS OF THE THORACIC SPINE; 2 XRAY VIEWS OF THE CERVICAL SPINE; TWO XRAY VIEWS OF THE CHEST 7/12/2022 7:41 am COMPARISON: None. HISTORY: ORDERING SYSTEM PROVIDED HISTORY: Dorsalgia of thoracic region TECHNOLOGIST PROVIDED HISTORY: Reason for exam:->mid back pain; ORDERING SYSTEM PROVIDED HISTORY: Cervical radiculopathy TECHNOLOGIST PROVIDED HISTORY: Reason for exam:->back pain, neck pain radiculopathy; ORDERING SYSTEM PROVIDED HISTORY: Dorsalgia of thoracic region TECHNOLOGIST PROVIDED HISTORY: Reason for exam:->mid back pain for 2-3 weeks pain down left arm FINDINGS: Chest: A mass measuring approximately 5 cm in diameter is located posterior to the right pulmonary hilum and is highly suspicious for neoplasm. Contrast-enhanced CT of the chest is recommended for further evaluation. Normal heart and pulmonary vascularity. Neither costophrenic angle is blunted. C-spine: Moderate C6-7 degenerative disc disease. No fracture or dislocation. Normal soft tissues. T-spine: Moderate multilevel degenerative disc disease. No fracture or dislocation. Right lung mass projecting posterior to the right pulmonary hilum which is quite suspicious for underlying neoplasm. Contrast-enhanced CT of the chest is recommended. Degenerative cervical and thoracic spondylosis. XR THORACIC SPINE (3 VIEWS)    Result Date: 7/12/2022  EXAMINATION: THREE XRAY VIEWS OF THE THORACIC SPINE; 2 XRAY VIEWS OF THE CERVICAL SPINE; TWO XRAY VIEWS OF THE CHEST 7/12/2022 7:41 am COMPARISON: None.  HISTORY: ORDERING SYSTEM PROVIDED HISTORY: Dorsalgia of thoracic region TECHNOLOGIST PROVIDED HISTORY: Reason for exam:->mid back pain; ORDERING SYSTEM PROVIDED HISTORY: Cervical radiculopathy TECHNOLOGIST PROVIDED HISTORY: Reason for exam:->back pain, neck pain radiculopathy; ORDERING SYSTEM PROVIDED HISTORY: Dorsalgia of thoracic region TECHNOLOGIST PROVIDED HISTORY: Reason for exam:->mid back pain for 2-3 weeks pain down left arm FINDINGS: Chest: A mass measuring approximately 5 cm in diameter is located posterior to the right pulmonary hilum and is highly suspicious for neoplasm. Contrast-enhanced CT of the chest is recommended for further evaluation. Normal heart and pulmonary vascularity. Neither costophrenic angle is blunted. C-spine: Moderate C6-7 degenerative disc disease. No fracture or dislocation. Normal soft tissues. T-spine: Moderate multilevel degenerative disc disease. No fracture or dislocation. Right lung mass projecting posterior to the right pulmonary hilum which is quite suspicious for underlying neoplasm. Contrast-enhanced CT of the chest is recommended. Degenerative cervical and thoracic spondylosis. CT CHEST W CONTRAST    Addendum Date: 7/13/2022    ADDENDUM: A request was made by the referring physician to assess whether there is pulmonary embolus are not. The study is limited to fully assess for pulmonary emboli, since there is suboptimal opacification of the pulmonary arteries and their tributaries. Regardless, there is no evidence to suggest pulmonary embolus. Result Date: 7/13/2022  EXAMINATION: CT OF THE CHEST WITH CONTRAST 7/12/2022 2:17 pm TECHNIQUE: CT of the chest was performed with the administration of intravenous contrast. Multiplanar reformatted images are provided for review. Automated exposure control, iterative reconstruction, and/or weight based adjustment of the mA/kV was utilized to reduce the radiation dose to as low as reasonably achievable. COMPARISON: None. HISTORY: ORDERING SYSTEM PROVIDED HISTORY: mass on XR TECHNOLOGIST PROVIDED HISTORY: Reason for exam:->mass on XR Decision Support Exception - unselect if not a suspected or confirmed emergency medical condition->Emergency Medical Condition (MA) FINDINGS: Mediastinum: Multiple, enlarged mediastinal lymph nodes are noted involving the paratracheal and subcarinal spaces.   The largest lymph node is identified in the subcarinal space, measuring 1.8 x 1.6 cm in size. This indicates metastatic disease. There is a conglomeration of enlarged right hilar and suprahilar lymph nodes, measuring 3.6 x 2.9 cm in size. This also indicates metastatic disease. No left hilar or axillary lymph nodes are present. The thoracic aorta is minimally atherosclerotic. The remainder of the visualized pulmonary vasculature is unremarkable. The heart is within normal limits in size. The thyroid gland and esophagus are unremarkable. A small amount of decreased attenuation is identified within the anterior trachea, probably representing mucous. Lungs/pleura: There is a large, necrotic soft tissue mass identified in the right lower lobe, which also encompasses the inferior right hilum. It measures 9.2 x 5.6 cm in size. It is spiculated in appearance. This is indicative of malignancy. Subcentimeter satellite nodules are noted in the right lower lobe, posterior to the previously described mass. A multitude of tiny centrilobular nodules and densities suspicious for tree-in-bud opacities are identified in the right middle lobe. Similar, but much less significant findings are noted in the superior segment of the right lower lobe. This could represent infective bronchiolitis or other inflammatory disorder, however, the presence of tumoral spread cannot be excluded. A 0.7 cm ill-defined nodule is seen in the right upper lobe (image 42 and series 302). A smaller nodule is noted in the left upper lobe (image 56 on series 302). Punctate nodules are seen in the left lower lobe (images 97 and 120 on series 302). It cannot be determined if all these nodules are neoplastic or inflammatory. A 0.2 cm perifissural lymph node is noted along the left major fissure. No other pulmonary parenchymal nodule or mass is seen. There is no evidence of acute consolidation or infiltrate. No active pleural disease is identified.   Centrilobular emphysema is noted. Upper Abdomen: Findings discussed in full detail on the report from the patient's CT scan of the abdomen and pelvis performed the same day. Please refer to that transcription. Soft Tissues/Bones: Expansile, lytic lesions are noted involving the right 2nd and 9th ribs, as well as the left 3rd rib. The largest is noted involving the right 2nd rib and measures 3.1 x 2.9 cm. This is consistent with osseous metastatic disease. Other, lytic lesions are noted involving multiple cervical, thoracic, and lumbar spine vertebral bodies. 1.  Large, necrotic soft tissue mass in the right lower lobe, which also encompasses the inferior right hilum, consistent with malignancy. 2.  Metastatic mediastinal and right hilar lymphadenopathy. 3.  Tiny satellite nodules in the right lower lobe, posterior to the previously described mass. 4.  Multitude of tiny centrilobular nodules and density suspicious for tree-in-bud opacities in the right middle lobe. Similar, but much less significant findings are seen in the superior segment of the right lower lobe. Question infective bronchiolitis or other inflammatory disorder. Tumoral spread cannot be excluded. 5.  Bilateral lung nodules, as described above. It cannot be determined if these nodules are neoplastic or inflammatory. 6.  Centrilobular emphysema. 7.  Abdominal findings discussed in full detail on the report from the patient's CT scan of the abdomen and pelvis performed the same day. Please refer to that transcription. 8.  Osseous metastatic disease. CT ABDOMEN PELVIS W IV CONTRAST Additional Contrast? None    Result Date: 7/12/2022  EXAMINATION: CT OF THE ABDOMEN AND PELVIS WITH CONTRAST 7/12/2022 2:17 pm TECHNIQUE: CT of the abdomen and pelvis was performed with the administration of intravenous contrast. Multiplanar reformatted images are provided for review.  Automated exposure control, iterative reconstruction, and/or weight based adjustment of the mA/kV was utilized to reduce the radiation dose to as low as reasonably achievable. COMPARISON: None. HISTORY: ORDERING SYSTEM PROVIDED HISTORY: mass on CXR, back pain TECHNOLOGIST PROVIDED HISTORY: Additional Contrast?->None Reason for exam:->mass on CXR, back pain Decision Support Exception - unselect if not a suspected or confirmed emergency medical condition->Emergency Medical Condition (MA) FINDINGS: Lower Chest: There is a large, necrotic soft tissue mass seen in the right lower lobe, which also encompasses the inferior right hilum. It measures 9.2 x 5.6 cm in size. It is spiculated in appearance. The finding is indicative of malignancy. Further discussion can be found on the report from the patient's CT scan of the chest performed the same time. There is eventration of the posterior left hemidiaphragm Organs: Multiple, solid, small, hypodense lesions are identified throughout the liver. The largest measures 2.4 cm in diameter. This is consistent with metastatic disease. Along the superior aspects of the spleen, there is a poorly defined area of hypodensity, measuring 4.4 by 2.6 by 2.7 cm in size. The hypodensity extends to the capsule. This most likely represents a splenic infarct. The gallbladder, biliary tree, and pancreas are unremarkable. A 1.3 x 0.9 cm hypodensity is seen within the right adrenal gland. The Hounsfield units range from 9-22. This could represent an adenoma, however, the presence of metastatic disease cannot be excluded. 2 solid-appearing, small left adrenal gland lesions are identified, with the largest measuring 1.5 x 1.2 cm. Same differential diagnosis exists. MRI of the adrenal glands can be considered for further evaluation. Bilateral renal cortical cysts are noted, of which the largest is noted in the lower pole of the right kidney, measuring 2.1 x 1.9 cm in size.   Multiple areas of ill-defined decreased attenuation are noted within both renal parenchyma, left greater than right.  The 2 largest areas are also noted within the left kidney. The 1 in the upper pole measures 3.1 x 3.3 cm. The one in the midpole measures 3.4 x 3.3 cm. There is no evidence of a cortical rim sign. Given the presence of the splenic infarct, these could represent bilateral renal infarcts without a cortical rim sign, however, the presence of pyelonephritis cannot be excluded. Metastatic disease is also included in the differential diagnosis. Further evaluation needs to be based on clinical grounds. GI/Bowel: The stomach is suboptimally distended, but grossly unremarkable. The small bowel is unremarkable for a non oral contrast study. Multiple diverticula are noted involving the sigmoid and descending colon. A moderate amount of retained stool is noted throughout the colon. The appendix is not definitely visualized. Pelvis: The urinary bladder is distended and unremarkable. The seminal vesicles are symmetric in size. The prostate gland is enlarged. Peritoneum/Retroperitoneum: A mildly enlarged gastrohepatic lymph node is noted, measuring 1.7 x 1.1 cm in size. No retroperitoneal or pelvic lymphadenopathy is identified. No free fluid is seen in the abdomen and pelvis. No abdominal or pelvic soft tissue mass is noted. The abdominal aorta is atherosclerotic. Bones/Soft Tissues: A small, expansile, lytic lesion is noted involving the posterolateral right 9th rib. Multiple lytic lesions are identified throughout the visualized thoracolumbar spine, as well as sacrum and iliac bones expansile lesions with cortical breakthrough are identified involving the right iliac bone, as well as left sacral ala. These is consistent with metastatic disease. 1.  Large, necrotic soft tissue mass in the right lower lobe of the lungs, which in Compazine is the inferior right hilum. This is described in full detail on the report from the patient's CT scan of the chest performed the same day.   Please refer to that transcription. 2.  Liver metastases. 3.  A splenic infarct. 4.  Bilateral adrenal gland lesions, which may represent adenomata. Metastatic disease cannot be excluded. MRI of the adrenal glands can be considered for further evaluation. 5.  Bilateral renal cortical cysts. 6.  Hypodense areas involving both renal parenchyma, left greater than right. These could represent bilateral infarcts without cortical rim sign, however, pyelonephritis cannot be excluded. Metastatic disease is also in the differential diagnosis. Clinical correlation is recommended. 7.  Colonic diverticulosis. 8.  Enlarged prostate gland. 9.  Mildly enlarged gastrohepatic lymph node. 10.  Osseous metastases, as described above. ASSESSMENT/PLAN :  73-year-old male  Significant smoking history  RLL mass     - CT chest with large necrotic soft tissue mass in the RLL encompasses in the inferior R hilum. Metastatic mediastinal and R hilar lymphadenopathy. Tiny satellite nodules in the RLL. Multitude of tiny centrilobular nodules and density suspicious for tree-in-bud opacities in the RML. BL lung nodules. Osseous metastatic disease.   -  CT A/P large necrotic soft tissue mass in the RLL of the lungs which encompasses the inferior R hilum. Liver metastases with a splenic infarct. BL adrenal gland lesions. Hypodense areas involving both renal parenchyma L>R. Diverticulosis. Enlarged prostate gland. Mildly enlarged gastrohepatic LN. Osseous metastases. - Cardiology following for elevated troponin. ECHO with EF at 55-60%. - Pulmonology following. CT guided biopsy of liver lesion  - LFT's with alk phos 164 otherwise unremarkable  - CBC with WBC 16.3 hgb 9.4, and platelets 924  - Need MRI brain to finish staging  - Will follow pathology including molecular markers and PDL1. Outpatient liquid biopsy  - Final systemic therapy recommendations pending above. [facility] benefit from [facility] as well  - Will follow    Thank you for this consult.  Please call with further questions or concerns      ARA Porras CNP  Electronically signed 7/13/2022 at 9:23 AM  Pt seen and examined.  Note updated  Sandra Abel MD

## 2022-07-14 ENCOUNTER — APPOINTMENT (OUTPATIENT)
Dept: CT IMAGING | Age: 62
DRG: 180 | End: 2022-07-14
Payer: COMMERCIAL

## 2022-07-14 LAB
ALBUMIN SERPL-MCNC: 3 G/DL (ref 3.5–5.2)
ALP BLD-CCNC: 166 U/L (ref 40–129)
ALT SERPL-CCNC: 34 U/L (ref 0–40)
ANION GAP SERPL CALCULATED.3IONS-SCNC: 10 MMOL/L (ref 7–16)
AST SERPL-CCNC: 29 U/L (ref 0–39)
BASOPHILS ABSOLUTE: 0.1 E9/L (ref 0–0.2)
BASOPHILS RELATIVE PERCENT: 0.6 % (ref 0–2)
BILIRUB SERPL-MCNC: 0.5 MG/DL (ref 0–1.2)
BUN BLDV-MCNC: 17 MG/DL (ref 6–23)
CALCIUM SERPL-MCNC: 9.5 MG/DL (ref 8.6–10.2)
CHLORIDE BLD-SCNC: 97 MMOL/L (ref 98–107)
CO2: 25 MMOL/L (ref 22–29)
CREAT SERPL-MCNC: 0.8 MG/DL (ref 0.7–1.2)
EOSINOPHILS ABSOLUTE: 0.09 E9/L (ref 0.05–0.5)
EOSINOPHILS RELATIVE PERCENT: 0.5 % (ref 0–6)
GFR AFRICAN AMERICAN: >60
GFR NON-AFRICAN AMERICAN: >60 ML/MIN/1.73
GLUCOSE BLD-MCNC: 128 MG/DL (ref 74–99)
HCT VFR BLD CALC: 28 % (ref 37–54)
HEMOGLOBIN: 9.4 G/DL (ref 12.5–16.5)
IMMATURE GRANULOCYTES #: 0.2 E9/L
IMMATURE GRANULOCYTES %: 1.2 % (ref 0–5)
LYMPHOCYTES ABSOLUTE: 2.55 E9/L (ref 1.5–4)
LYMPHOCYTES RELATIVE PERCENT: 14.9 % (ref 20–42)
MCH RBC QN AUTO: 29.8 PG (ref 26–35)
MCHC RBC AUTO-ENTMCNC: 33.6 % (ref 32–34.5)
MCV RBC AUTO: 88.9 FL (ref 80–99.9)
MONOCYTES ABSOLUTE: 1.83 E9/L (ref 0.1–0.95)
MONOCYTES RELATIVE PERCENT: 10.7 % (ref 2–12)
NEUTROPHILS ABSOLUTE: 12.34 E9/L (ref 1.8–7.3)
NEUTROPHILS RELATIVE PERCENT: 72.1 % (ref 43–80)
PDW BLD-RTO: 13.6 FL (ref 11.5–15)
PLATELET # BLD: 298 E9/L (ref 130–450)
PMV BLD AUTO: 9.7 FL (ref 7–12)
POLYCHROMASIA: ABNORMAL
POTASSIUM REFLEX MAGNESIUM: 4.6 MMOL/L (ref 3.5–5)
RBC # BLD: 3.15 E12/L (ref 3.8–5.8)
SODIUM BLD-SCNC: 132 MMOL/L (ref 132–146)
TOTAL PROTEIN: 6.7 G/DL (ref 6.4–8.3)
WBC # BLD: 17.1 E9/L (ref 4.5–11.5)

## 2022-07-14 PROCEDURE — 80053 COMPREHEN METABOLIC PANEL: CPT

## 2022-07-14 PROCEDURE — 6370000000 HC RX 637 (ALT 250 FOR IP): Performed by: FAMILY MEDICINE

## 2022-07-14 PROCEDURE — 94640 AIRWAY INHALATION TREATMENT: CPT

## 2022-07-14 PROCEDURE — 0FB03ZX EXCISION OF LIVER, PERCUTANEOUS APPROACH, DIAGNOSTIC: ICD-10-PCS | Performed by: FAMILY MEDICINE

## 2022-07-14 PROCEDURE — 2500000003 HC RX 250 WO HCPCS: Performed by: RADIOLOGY

## 2022-07-14 PROCEDURE — 6370000000 HC RX 637 (ALT 250 FOR IP): Performed by: INTERNAL MEDICINE

## 2022-07-14 PROCEDURE — 2580000003 HC RX 258: Performed by: FAMILY MEDICINE

## 2022-07-14 PROCEDURE — 99233 SBSQ HOSP IP/OBS HIGH 50: CPT | Performed by: FAMILY MEDICINE

## 2022-07-14 PROCEDURE — 6360000002 HC RX W HCPCS: Performed by: INTERNAL MEDICINE

## 2022-07-14 PROCEDURE — 85025 COMPLETE CBC W/AUTO DIFF WBC: CPT

## 2022-07-14 PROCEDURE — 2709999900 CT GUIDED NEEDLE PLACEMENT

## 2022-07-14 PROCEDURE — 88342 IMHCHEM/IMCYTCHM 1ST ANTB: CPT

## 2022-07-14 PROCEDURE — 88341 IMHCHEM/IMCYTCHM EA ADD ANTB: CPT

## 2022-07-14 PROCEDURE — 47000 NEEDLE BIOPSY OF LIVER PERQ: CPT

## 2022-07-14 PROCEDURE — 36415 COLL VENOUS BLD VENIPUNCTURE: CPT

## 2022-07-14 PROCEDURE — 2060000000 HC ICU INTERMEDIATE R&B

## 2022-07-14 PROCEDURE — 88307 TISSUE EXAM BY PATHOLOGIST: CPT

## 2022-07-14 RX ORDER — LEVETIRACETAM 500 MG/1
500 TABLET ORAL 2 TIMES DAILY
Status: DISCONTINUED | OUTPATIENT
Start: 2022-07-14 | End: 2022-07-15 | Stop reason: HOSPADM

## 2022-07-14 RX ORDER — DEXAMETHASONE SODIUM PHOSPHATE 4 MG/ML
4 INJECTION, SOLUTION INTRA-ARTICULAR; INTRALESIONAL; INTRAMUSCULAR; INTRAVENOUS; SOFT TISSUE EVERY 12 HOURS
Status: DISCONTINUED | OUTPATIENT
Start: 2022-07-14 | End: 2022-07-15 | Stop reason: HOSPADM

## 2022-07-14 RX ORDER — LIDOCAINE HYDROCHLORIDE 20 MG/ML
INJECTION, SOLUTION INFILTRATION; PERINEURAL
Status: COMPLETED | OUTPATIENT
Start: 2022-07-14 | End: 2022-07-14

## 2022-07-14 RX ADMIN — DEXAMETHASONE SODIUM PHOSPHATE 4 MG: 4 INJECTION, SOLUTION INTRA-ARTICULAR; INTRALESIONAL; INTRAMUSCULAR; INTRAVENOUS; SOFT TISSUE at 20:19

## 2022-07-14 RX ADMIN — IPRATROPIUM BROMIDE AND ALBUTEROL SULFATE 1 AMPULE: .5; 2.5 SOLUTION RESPIRATORY (INHALATION) at 19:34

## 2022-07-14 RX ADMIN — IPRATROPIUM BROMIDE AND ALBUTEROL SULFATE 1 AMPULE: .5; 2.5 SOLUTION RESPIRATORY (INHALATION) at 10:45

## 2022-07-14 RX ADMIN — LEVETIRACETAM 500 MG: 500 TABLET, FILM COATED ORAL at 20:19

## 2022-07-14 RX ADMIN — Medication 10 ML: at 20:23

## 2022-07-14 RX ADMIN — LIDOCAINE HYDROCHLORIDE 10 ML: 20 INJECTION, SOLUTION INFILTRATION; PERINEURAL at 15:17

## 2022-07-14 RX ADMIN — IPRATROPIUM BROMIDE AND ALBUTEROL SULFATE 1 AMPULE: .5; 2.5 SOLUTION RESPIRATORY (INHALATION) at 07:45

## 2022-07-14 RX ADMIN — ACETAMINOPHEN 650 MG: 325 TABLET ORAL at 18:17

## 2022-07-14 ASSESSMENT — PAIN SCALES - GENERAL
PAINLEVEL_OUTOF10: 0
PAINLEVEL_OUTOF10: 5

## 2022-07-14 ASSESSMENT — PAIN DESCRIPTION - ONSET: ONSET: GRADUAL

## 2022-07-14 ASSESSMENT — PAIN DESCRIPTION - DESCRIPTORS: DESCRIPTORS: ACHING

## 2022-07-14 ASSESSMENT — PAIN DESCRIPTION - FREQUENCY: FREQUENCY: CONTINUOUS

## 2022-07-14 ASSESSMENT — PAIN DESCRIPTION - LOCATION: LOCATION: LEG

## 2022-07-14 ASSESSMENT — PAIN DESCRIPTION - PAIN TYPE: TYPE: ACUTE PAIN

## 2022-07-14 ASSESSMENT — PAIN DESCRIPTION - ORIENTATION: ORIENTATION: RIGHT;LEFT;DISTAL

## 2022-07-14 ASSESSMENT — PAIN - FUNCTIONAL ASSESSMENT: PAIN_FUNCTIONAL_ASSESSMENT: PREVENTS OR INTERFERES SOME ACTIVE ACTIVITIES AND ADLS

## 2022-07-14 NOTE — PROGRESS NOTES
P Quality Flow/Interdisciplinary Rounds Progress Note        Quality Flow Rounds held on July 14, 2022    Disciplines Attending:  Bedside Nurse, ,  and Nursing Unit Leadership    Lorie Aguilar was admitted on 7/12/2022 12:31 PM    Anticipated Discharge Date:       Disposition:    Geo Score:  Geo Scale Score: 22    Readmission Risk              Risk of Unplanned Readmission:  10           Discussed patient goal for the day, patient clinical progression, and barriers to discharge. The following Goal(s) of the Day/Commitment(s) have been identified: IR today for Liver Biopsy, check Hem-OC plan.       Lori Olivo RN  July 14, 2022

## 2022-07-14 NOTE — OR NURSING
Patient brought into CT room and procedure explained by Dr. Scotty Alvarez. Procedural consent obtained. Patient placed supine head first  on CT table with continuous cardiac, oxygen and vitals every 5 minutes. Safety checks done prior,during and after. Using CT, needle inserted and biopsy obtained from liver by Dr. Scotty Alvarez. Patient re-scanned and imaging reviewed by Dr. Scotty Alvarez. Biopsy site cleaned and dressing applied. Patient taken back to room and reported called to floor RN, Indu Urias. 18x1o maxcore used and 4 passes completed.

## 2022-07-14 NOTE — PROGRESS NOTES
[facility]  Hematology/Oncology  Consult      Patient Name: Neville Weathers  YOB: 1960  PCP: No primary care provider on file. Referring Provider:      Reason for Consultation:   Chief Complaint   Patient presents with    Back Pain     urgent care found mass in lung on xray, sent from urgent care        History of Present Illness: This is a 26-year-old male patient a significant smoking history who presented to the ED for evaluation of who presented to the emergency room for evaluation of back pain, arthritis and paresthesias of his right third and left fourth and fifth fingers. CT chest with large necrotic soft tissue mass in the RLL encompasses in the inferior R hilum. Metastatic mediastinal and R hilar lymphadenopathy. Tiny satellite nodules in the RLL. Multitude of tiny centrilobular nodules and density suspicious for tree-in-bud opacities in the RML. BL lung nodules. Osseous metastatic disease. CT A/P large necrotic soft tissue mass in the RLL of the lungs which encompasses the inferior R hilum. Liver metastases with a splenic infarct. BL adrenal gland lesions. Hypodense areas involving both renal parenchyma L>R. Diverticulosis. Enlarged prostate gland. Mildly enlarged gastrohepatic LN. Osseous metastases. Cervical and thoracic spine XR with R lung mass. Degenerative cervical and thoracic spondylosis. Cardiology following for elevated troponin. ECHO with EF at 55-60%. Pulmonology following. Patient is for CT guided biopsy of liver lesion. LFT's with alk phos 164 otherwise unremarkable. CBC with WBC 16.3 hgb 9.4, and platelets 701. Consultation for evaluation of new lung mass with metastatic disease. Diagnostic Data:     History reviewed. No pertinent past medical history.     Patient Active Problem List    Diagnosis Date Noted    Back pain 07/13/2022    Elevated troponin     Atypical angina (HCC)     Lung mass 07/12/2022    Lung cancer, primary, with metastasis from lung to other site, left [facility]) 07/12/2022    Metastatic malignant neoplasm (HCC)     Shortness of breath     Tobacco use     Paresthesias         History reviewed. No pertinent surgical history. Family History  History reviewed. No pertinent family history. Social History    TOBACCO:   reports that he has been smoking cigarettes. He has been smoking about 0.75 packs per day. He has never used smokeless tobacco.  ETOH:   reports current alcohol use of about 1.0 standard drink of alcohol per week. Home Medications  Prior to Admission medications    Medication Sig Start Date End Date Taking? Authorizing Provider   ibuprofen (ADVIL;MOTRIN) 800 MG tablet Take 800 mg by mouth every 6 hours as needed for Pain   Yes Historical Provider, MD       Allergies  No Known Allergies    Review of Systems: Generalized weakness and back pain. 20 lb weight loss in 3 months. Objective  /70   Pulse 85   Temp 97.7 °F (36.5 °C)   Resp 18   Ht 5' 9\" (1.753 m)   Wt 145 lb 14.4 oz (66.2 kg)   SpO2 91%   BMI 21.55 kg/m²     Physical Exam:   Performance Status:  General: AAO to person, place, time, in no acute distress,   Head and neck : PERRLA, EOMI . Sclera non icteric. Oropharynx : Clear  Neck: no JVD,  no adenopathy  Heart: Regular rate and regular rhythm, no murmur  Lungs: Clear to auscultation   Extremities: No edema,no cyanosis, no clubbing. Abdomen: Soft, non-tender;no masses, no organomegaly  Skin:  No rash. Neurologic:Cranial nerves grossly intact. No focal motor or sensory deficits .     Recent Laboratory Data-   Lab Results   Component Value Date    WBC 17.1 (H) 07/14/2022    HGB 9.4 (L) 07/14/2022    HCT 28.0 (L) 07/14/2022    MCV 88.9 07/14/2022     07/14/2022    LYMPHOPCT 14.9 (L) 07/14/2022    RBC 3.15 (L) 07/14/2022    MCH 29.8 07/14/2022    MCHC 33.6 07/14/2022    RDW 13.6 07/14/2022    NEUTOPHILPCT 72.1 07/14/2022    MONOPCT 10.7 07/14/2022    BASOPCT 0.6 07/14/2022    NEUTROABS 12.34 (H) 07/14/2022    LYMPHSABS 2.55 07/14/2022    MONOSABS 1.83 (H) 07/14/2022    EOSABS 0.09 07/14/2022    BASOSABS 0.10 07/14/2022       Lab Results   Component Value Date     07/14/2022    K 4.6 07/14/2022    CL 97 (L) 07/14/2022    CO2 25 07/14/2022    BUN 17 07/14/2022    CREATININE 0.8 07/14/2022    GLUCOSE 128 (H) 07/14/2022    CALCIUM 9.5 07/14/2022    PROT 6.7 07/14/2022    LABALBU 3.0 (L) 07/14/2022    BILITOT 0.5 07/14/2022    ALKPHOS 166 (H) 07/14/2022    AST 29 07/14/2022    ALT 34 07/14/2022    LABGLOM >60 07/14/2022    GFRAA >60 07/14/2022       No results found for: IRON, TIBC, FERRITIN        Radiology-    XR CHEST STANDARD (2 VW)    Result Date: 7/12/2022  EXAMINATION: THREE XRAY VIEWS OF THE THORACIC SPINE; 2 XRAY VIEWS OF THE CERVICAL SPINE; TWO XRAY VIEWS OF THE CHEST 7/12/2022 7:41 am COMPARISON: None. HISTORY: ORDERING SYSTEM PROVIDED HISTORY: Dorsalgia of thoracic region TECHNOLOGIST PROVIDED HISTORY: Reason for exam:->mid back pain; ORDERING SYSTEM PROVIDED HISTORY: Cervical radiculopathy TECHNOLOGIST PROVIDED HISTORY: Reason for exam:->back pain, neck pain radiculopathy; ORDERING SYSTEM PROVIDED HISTORY: Dorsalgia of thoracic region TECHNOLOGIST PROVIDED HISTORY: Reason for exam:->mid back pain for 2-3 weeks pain down left arm FINDINGS: Chest: A mass measuring approximately 5 cm in diameter is located posterior to the right pulmonary hilum and is highly suspicious for neoplasm. Contrast-enhanced CT of the chest is recommended for further evaluation. Normal heart and pulmonary vascularity. Neither costophrenic angle is blunted. C-spine: Moderate C6-7 degenerative disc disease. No fracture or dislocation. Normal soft tissues. T-spine: Moderate multilevel degenerative disc disease. No fracture or dislocation. Right lung mass projecting posterior to the right pulmonary hilum which is quite suspicious for underlying neoplasm. Contrast-enhanced CT of the chest is recommended. Degenerative cervical and thoracic spondylosis. XR CERVICAL SPINE (2-3 VIEWS)    Result Date: 7/12/2022  EXAMINATION: THREE XRAY VIEWS OF THE THORACIC SPINE; 2 XRAY VIEWS OF THE CERVICAL SPINE; TWO XRAY VIEWS OF THE CHEST 7/12/2022 7:41 am COMPARISON: None. HISTORY: ORDERING SYSTEM PROVIDED HISTORY: Dorsalgia of thoracic region TECHNOLOGIST PROVIDED HISTORY: Reason for exam:->mid back pain; ORDERING SYSTEM PROVIDED HISTORY: Cervical radiculopathy TECHNOLOGIST PROVIDED HISTORY: Reason for exam:->back pain, neck pain radiculopathy; ORDERING SYSTEM PROVIDED HISTORY: Dorsalgia of thoracic region TECHNOLOGIST PROVIDED HISTORY: Reason for exam:->mid back pain for 2-3 weeks pain down left arm FINDINGS: Chest: A mass measuring approximately 5 cm in diameter is located posterior to the right pulmonary hilum and is highly suspicious for neoplasm. Contrast-enhanced CT of the chest is recommended for further evaluation. Normal heart and pulmonary vascularity. Neither costophrenic angle is blunted. C-spine: Moderate C6-7 degenerative disc disease. No fracture or dislocation. Normal soft tissues. T-spine: Moderate multilevel degenerative disc disease. No fracture or dislocation. Right lung mass projecting posterior to the right pulmonary hilum which is quite suspicious for underlying neoplasm. Contrast-enhanced CT of the chest is recommended. Degenerative cervical and thoracic spondylosis. XR THORACIC SPINE (3 VIEWS)    Result Date: 7/12/2022  EXAMINATION: THREE XRAY VIEWS OF THE THORACIC SPINE; 2 XRAY VIEWS OF THE CERVICAL SPINE; TWO XRAY VIEWS OF THE CHEST 7/12/2022 7:41 am COMPARISON: None.  HISTORY: ORDERING SYSTEM PROVIDED HISTORY: Dorsalgia of thoracic region TECHNOLOGIST PROVIDED HISTORY: Reason for exam:->mid back pain; ORDERING SYSTEM PROVIDED HISTORY: Cervical radiculopathy TECHNOLOGIST PROVIDED HISTORY: Reason for exam:->back pain, neck pain radiculopathy; ORDERING SYSTEM PROVIDED HISTORY: Dorsalgia of thoracic region TECHNOLOGIST PROVIDED HISTORY: Reason for exam:->mid back pain for 2-3 weeks pain down left arm FINDINGS: Chest: A mass measuring approximately 5 cm in diameter is located posterior to the right pulmonary hilum and is highly suspicious for neoplasm. Contrast-enhanced CT of the chest is recommended for further evaluation. Normal heart and pulmonary vascularity. Neither costophrenic angle is blunted. C-spine: Moderate C6-7 degenerative disc disease. No fracture or dislocation. Normal soft tissues. T-spine: Moderate multilevel degenerative disc disease. No fracture or dislocation. Right lung mass projecting posterior to the right pulmonary hilum which is quite suspicious for underlying neoplasm. Contrast-enhanced CT of the chest is recommended. Degenerative cervical and thoracic spondylosis. CT CHEST W CONTRAST    Addendum Date: 7/13/2022    ADDENDUM: A request was made by the referring physician to assess whether there is pulmonary embolus are not. The study is limited to fully assess for pulmonary emboli, since there is suboptimal opacification of the pulmonary arteries and their tributaries. Regardless, there is no evidence to suggest pulmonary embolus. Result Date: 7/13/2022  EXAMINATION: CT OF THE CHEST WITH CONTRAST 7/12/2022 2:17 pm TECHNIQUE: CT of the chest was performed with the administration of intravenous contrast. Multiplanar reformatted images are provided for review. Automated exposure control, iterative reconstruction, and/or weight based adjustment of the mA/kV was utilized to reduce the radiation dose to as low as reasonably achievable. COMPARISON: None.  HISTORY: ORDERING SYSTEM PROVIDED HISTORY: mass on XR TECHNOLOGIST PROVIDED HISTORY: Reason for exam:->mass on XR Decision Support Exception - unselect if not a suspected or confirmed emergency medical condition->Emergency Medical Condition (MA) FINDINGS: Mediastinum: Multiple, enlarged mediastinal lymph nodes are noted involving the paratracheal and subcarinal spaces. The largest lymph node is identified in the subcarinal space, measuring 1.8 x 1.6 cm in size. This indicates metastatic disease. There is a conglomeration of enlarged right hilar and suprahilar lymph nodes, measuring 3.6 x 2.9 cm in size. This also indicates metastatic disease. No left hilar or axillary lymph nodes are present. The thoracic aorta is minimally atherosclerotic. The remainder of the visualized pulmonary vasculature is unremarkable. The heart is within normal limits in size. The thyroid gland and esophagus are unremarkable. A small amount of decreased attenuation is identified within the anterior trachea, probably representing mucous. Lungs/pleura: There is a large, necrotic soft tissue mass identified in the right lower lobe, which also encompasses the inferior right hilum. It measures 9.2 x 5.6 cm in size. It is spiculated in appearance. This is indicative of malignancy. Subcentimeter satellite nodules are noted in the right lower lobe, posterior to the previously described mass. A multitude of tiny centrilobular nodules and densities suspicious for tree-in-bud opacities are identified in the right middle lobe. Similar, but much less significant findings are noted in the superior segment of the right lower lobe. This could represent infective bronchiolitis or other inflammatory disorder, however, the presence of tumoral spread cannot be excluded. A 0.7 cm ill-defined nodule is seen in the right upper lobe (image 42 and series 302). A smaller nodule is noted in the left upper lobe (image 56 on series 302). Punctate nodules are seen in the left lower lobe (images 97 and 120 on series 302). It cannot be determined if all these nodules are neoplastic or inflammatory. A 0.2 cm perifissural lymph node is noted along the left major fissure. No other pulmonary parenchymal nodule or mass is seen.  There is no evidence of acute Automated exposure control, iterative reconstruction, and/or weight based adjustment of the mA/kV was utilized to reduce the radiation dose to as low as reasonably achievable. COMPARISON: None. HISTORY: ORDERING SYSTEM PROVIDED HISTORY: mass on CXR, back pain TECHNOLOGIST PROVIDED HISTORY: Additional Contrast?->None Reason for exam:->mass on CXR, back pain Decision Support Exception - unselect if not a suspected or confirmed emergency medical condition->Emergency Medical Condition (MA) FINDINGS: Lower Chest: There is a large, necrotic soft tissue mass seen in the right lower lobe, which also encompasses the inferior right hilum. It measures 9.2 x 5.6 cm in size. It is spiculated in appearance. The finding is indicative of malignancy. Further discussion can be found on the report from the patient's CT scan of the chest performed the same time. There is eventration of the posterior left hemidiaphragm Organs: Multiple, solid, small, hypodense lesions are identified throughout the liver. The largest measures 2.4 cm in diameter. This is consistent with metastatic disease. Along the superior aspects of the spleen, there is a poorly defined area of hypodensity, measuring 4.4 by 2.6 by 2.7 cm in size. The hypodensity extends to the capsule. This most likely represents a splenic infarct. The gallbladder, biliary tree, and pancreas are unremarkable. A 1.3 x 0.9 cm hypodensity is seen within the right adrenal gland. The Hounsfield units range from 9-22. This could represent an adenoma, however, the presence of metastatic disease cannot be excluded. 2 solid-appearing, small left adrenal gland lesions are identified, with the largest measuring 1.5 x 1.2 cm. Same differential diagnosis exists. MRI of the adrenal glands can be considered for further evaluation. Bilateral renal cortical cysts are noted, of which the largest is noted in the lower pole of the right kidney, measuring 2.1 x 1.9 cm in size.   Multiple areas of ill-defined decreased attenuation are noted within both renal parenchyma, left greater than right. The 2 largest areas are also noted within the left kidney. The 1 in the upper pole measures 3.1 x 3.3 cm. The one in the midpole measures 3.4 x 3.3 cm. There is no evidence of a cortical rim sign. Given the presence of the splenic infarct, these could represent bilateral renal infarcts without a cortical rim sign, however, the presence of pyelonephritis cannot be excluded. Metastatic disease is also included in the differential diagnosis. Further evaluation needs to be based on clinical grounds. GI/Bowel: The stomach is suboptimally distended, but grossly unremarkable. The small bowel is unremarkable for a non oral contrast study. Multiple diverticula are noted involving the sigmoid and descending colon. A moderate amount of retained stool is noted throughout the colon. The appendix is not definitely visualized. Pelvis: The urinary bladder is distended and unremarkable. The seminal vesicles are symmetric in size. The prostate gland is enlarged. Peritoneum/Retroperitoneum: A mildly enlarged gastrohepatic lymph node is noted, measuring 1.7 x 1.1 cm in size. No retroperitoneal or pelvic lymphadenopathy is identified. No free fluid is seen in the abdomen and pelvis. No abdominal or pelvic soft tissue mass is noted. The abdominal aorta is atherosclerotic. Bones/Soft Tissues: A small, expansile, lytic lesion is noted involving the posterolateral right 9th rib. Multiple lytic lesions are identified throughout the visualized thoracolumbar spine, as well as sacrum and iliac bones expansile lesions with cortical breakthrough are identified involving the right iliac bone, as well as left sacral ala. These is consistent with metastatic disease. 1.  Large, necrotic soft tissue mass in the right lower lobe of the lungs, which in Compazine is the inferior right hilum.   This is described in full detail on the report from the patient's CT scan of the chest performed the same day. Please refer to that transcription. 2.  Liver metastases. 3.  A splenic infarct. 4.  Bilateral adrenal gland lesions, which may represent adenomata. Metastatic disease cannot be excluded. MRI of the adrenal glands can be considered for further evaluation. 5.  Bilateral renal cortical cysts. 6.  Hypodense areas involving both renal parenchyma, left greater than right. These could represent bilateral infarcts without cortical rim sign, however, pyelonephritis cannot be excluded. Metastatic disease is also in the differential diagnosis. Clinical correlation is recommended. 7.  Colonic diverticulosis. 8.  Enlarged prostate gland. 9.  Mildly enlarged gastrohepatic lymph node. 10.  Osseous metastases, as described above. ASSESSMENT/PLAN :  70-year-old male  Significant smoking history  RLL mass     - CT chest with large necrotic soft tissue mass in the RLL encompasses in the inferior R hilum. Metastatic mediastinal and R hilar lymphadenopathy. Tiny satellite nodules in the RLL. Multitude of tiny centrilobular nodules and density suspicious for tree-in-bud opacities in the RML. BL lung nodules. Osseous metastatic disease.   -  CT A/P large necrotic soft tissue mass in the RLL of the lungs which encompasses the inferior R hilum. Liver metastases with a splenic infarct. BL adrenal gland lesions. Hypodense areas involving both renal parenchyma L>R. Diverticulosis. Enlarged prostate gland. Mildly enlarged gastrohepatic LN. Osseous metastases. - Cardiology following for elevated troponin. ECHO with EF at 55-60%. - Pulmonology following. CT guided biopsy of liver lesion  - LFT's with alk phos 164 otherwise unremarkable  - CBC with WBC 16.3 hgb 9.4, and platelets 778  - Need MRI brain to finish staging  - Will follow pathology including molecular markers and PDL1.  Outpatient liquid biopsy  - Final systemic therapy recommendations pending above. Will benefit from Yuniel Bean as well  - Will follow    7/14/22  - RLL mass with CT C/A/P as above. - MRI there are a few small scattered foci of diffusion restriction in the bilateral supratentorial brain concerning for small areas of infarct which is likely unrelated. Multiple enhancing intraparenchymal lesions consistent with intra cranial metastatic disease. There is associated edema and mass-effect in the left frontal lobe without midline shift. Enhancing lesions in the frontal calvarium concerning for osseous metastatic disease. Patient will be  initiated on Decadron to decrease CNS edema  and Keppra for seizure prophylaxis. Radiation oncology will be consulted for whole brain radiation  -Patient is for CT-guided biopsy of liver lesion.  -Pulmonology and cardiology following  - Will follow pathology including molecular markers and PDL1. Outpatient liquid biopsy  - Final systemic therapy recommendations pending above. Will benefit from Yuniel Bean as well  - Will follow    Thank you for this consult. Please call with further questions or concerns      ARA Sanchez - CNP  Electronically signed 7/14/2022 at 9:57 AM  Pt seen and examined.  Note updated  Ayaan Seaman MD

## 2022-07-14 NOTE — CARE COORDINATION
Await CT guided bx liver; pt with large lung mass. Await pulm/ hemonc plan. Plan is home with spouse, no needs. Jeff Mcgee.

## 2022-07-14 NOTE — PROGRESS NOTES
[facility] Progress Note    Admitting Date and Time: 7/12/2022 12:31 PM  Admit Dx: Shortness of breath [R06.02]  Lung mass [R91.8]  Lung cancer, primary, with metastasis from lung to other site, left [facility]) [C34.92]  Metast[facility] malignant ne[facility], [facility] ([facility] [C79.9]  Pneumonia due to infectious organism, unspecified laterality, unspecified part of lung [J18.9]    Subjective:  Patient is being followed for Shortness of breath [R06.02]  Lung mass [R91.8]  Lung cancer, primary, with metastasis from lung to other site, left [facility]) [C34.92]  Metastatic malignant neoplasm, unspecified site ([facility] [C79.9]  Pneumonia due to infectious organism, unspecified laterality, unspecified part of lung [J18.9]   Pt feels frustrated. Wife wants to take him home soon as possible. She understands the importance of the biopsy and the need to observe him overnight to ensure no bleeding issues. Patient also has a DVT of the left lower extremity and recommendations per hematology regarding Lovenox versus Eliquis needs to be determined. Per RN: To contact hematology regarding therapy for DVT. ROS: denies fever, chills, cp, sob, n/v, HA unless stated above.       dexamethasone  4 mg Oral Q12H    levETIRAcetam  500 mg Oral BID    pantoprazole  40 mg Oral QAM AC    sodium chloride flush  5-40 mL IntraVENous 2 times per day    nicotine  1 patch TransDERmal Daily    [Held by provider] heparin (porcine)  5,000 Units SubCUTAneous Q8H    ipratropium-albuterol  1 ampule Inhalation Q4H WA     sodium chloride flush, 5-40 mL, PRN  sodium chloride, , PRN  ondansetron, 4 mg, Q8H PRN   Or  ondansetron, 4 mg, Q6H PRN  polyethylene glycol, 17 g, Daily PRN  acetaminophen, 650 mg, Q6H PRN   Or  acetaminophen, 650 mg, Q6H PRN  morphine, 2 mg, Q4H PRN  melatonin, 3 mg, Nightly PRN  perflutren lipid microspheres, 1.5 mL, ONCE PRN         Objective:    /68   Pulse 93   Temp 99.6 °F (37.6 °C) (Oral)   Resp 18  5' 9\" (1.753 m)   Wt 145 lb 14.4 oz (66.2 kg)   SpO2 92%   BMI 21.55 kg/m²     General Appearance: alert and oriented to person, place and time and in no acute distress  Skin: warm and dry  Head: normocephalic and atraumatic  Eyes: pupils equal, round, and reactive to light, extraocular eye movements intact, conjunctivae normal  Neck: neck supple and non tender without mass   Pulmonary/Chest: clear to auscultation bilaterally- no wheezes, rales or rhonchi, normal air movement, no respiratory distress  Cardiovascular: normal rate, normal S1 and S2 and no carotid bruits  Abdomen: soft, non-tender, non-distended, normal bowel sounds, no masses or organomegaly  Extremities: no cyanosis, no clubbing and no edema  Neurologic: no cranial nerve deficit and speech normal        Recent Labs     07/12/22  1303 07/13/22  0330 07/14/22  0515    133 132   K 4.2 4.3 4.6   CL 97* 98 97*   CO2 27 24 25   BUN 19 21 17   CREATININE 0.7 0.8 0.8   GLUCOSE 106* 127* 128*   CALCIUM 10.0 9.4 9.5       Recent Labs     07/12/22  1303 07/13/22  0330 07/14/22  0515   WBC 16.9* 16.3* 17.1*   RBC 3.85 3.09* 3.15*   HGB 11.5* 9.4* 9.4*   HCT 34.5* 27.7* 28.0*   MCV 89.6 89.6 88.9   MCH 29.9 30.4 29.8   MCHC 33.3 33.9 33.6   RDW 13.2 13.3 13.6    295 298   MPV 9.7 10.1 9.7       Radiology:   Echo Complete    Result Date: 7/13/2022  Transthoracic Echocardiography Report (TTE)  Demographics   Patient Name   Marquita Najjar Gender            Male                 [address] [facility] [id number]      Room Number       0603  Number   Account #      [de-identified]     Procedure Date    07/13/2022   Corporate ID                 Ordering          Kelsy Johnston MD                               Physician   Accession      [id number]    Referring  Number                       Physician   Date of Birth  1960    Sonographer       Martin Lopes   Age            62 year(s)    Interpreting      [address] [place] [address] Physician         Physician Cardiology                                                 Vanessa Heranndez MD                                Any Other  Procedure Type of Study   TTE procedure:Echo Complete W/Doppler & Color Flow. Procedure Date Date: 07/13/2022 Start: 09:06 AM Study Location: Echo Lab Technical Quality: Adequate visualization Indications:Abnormal ECG and Left ventricle systolic dysfunction. Patient Status: Routine Height: 69 inches Weight: 143 pounds BSA: 1.79 m^2 BMI: 21.12 kg/m^2 HR: 80 bpm BP: 108/69 mmHg  Findings   Left Ventricle  Normal left ventricular chamber size. Normal left ventricular systolic function. Visually estimated LVEF is 55-60 %. No wall motion abnormalities. Normal diastolic function. Normal left atrial pressure. Right Ventricle  Normal right ventricle structure and function. Left Atrium  Normal left atrial size. Right Atrium  Normal right atrial size   Mitral Valve  Physiologic and/or trace mitral regurgitation is present. No evidence of hemodynamically significant mitral stenosis. Tricuspid Valve  The tricuspid valve appears structurally normal.  Physiologic and/or trace tricuspid regurgitation. Aortic Valve  Trileaflet aortic valve. Normal leaflet mobility. No aortic stenosis. No aortic regurgitation. Pulmonic Valve  Normal pulmonic valve structure and function. Physiologic and/or trace pulmonic regurgitation present. Pericardial Effusion  No evidence for hemodynamically significant pericardial effusion. Pleural Effusion  No evidence of pleural effusion. Aorta  Normal aortic root and ascending aorta. Miscellaneous  The inferior vena cava diameter is normal with normal respiratory  variation. Unable to estimate PA pressure. Conclusions   Summary  Normal left ventricular chamber size. Normal left ventricular systolic function. Visually estimated LVEF is 55-60 %. No wall motion abnormalities. Normal diastolic function. Normal left atrial pressure. Normal right ventricle structure and function. Normal left atrial size. Normal right atrial size  Unable to estimate PA pressure. No comparison study available.    Signature   ----------------------------------------------------------------  Electronically signed by Ashley Rider MD(Interpreting  physician) on 07/13/2022 01:20 PM  ----------------------------------------------------------------  M-Mode/2D Measurements & Calculations   LV Diastolic    LV Systolic Dimension: 4.1   AV Cusp Separation: 1.5 cmLA  Dimension: 5.2  cm                           Dimension: 3.2 cmAO Root  cm              LV Volume Diastolic: 911.4   Dimension: 3 cm  LV FS:21.2 %    ml  LV PW           LV Volume Systolic: 81.3 ml  Diastolic: 0.9  LV EDV/LV EDV Index: 130.8  cm              ml/73 ml/m^2LV ESV/LV ESV    RV Diastolic Dimension: 3.1  LV PW Systolic: Index: 71.5 FK/97AM/ m^2     cm  1.6 cm          EF Calculated: 43.5 %  Septum          LV Mass Index: 108 l/min*m^2 LA/Aorta: 2.46  Diastolic: 1.1  LV Length: 9.6 cm            Ascending Aorta: 3 cm  cm                                           LA volume/Index: 35.5 ml  Septum          LVOT: 2 cm                   /12EA/M^2  Systolic: 1.1  cm  CO: 4.05 l/min                               IVC Expiration: 1.1 cm  CI: 2.82  l/m*m^2  LV Mass: 194.16  g  Doppler Measurements & Calculations   MV Peak E-Wave: 0.64 AV Peak Velocity:     LVOT Peak Velocity: 0.88 m/s  m/s                  1.15 m/s              LVOT Mean Velocity: 0.73 m/s  MV Peak A-Wave: 0.73 AV Peak Gradient:     LVOT Peak Gradient: 3.1  m/s                  5.32 mmHg             mmHgLVOT Mean Gradient: 2.3  MV E/A Ratio: 0.88   AV Mean Velocity:     mmHg  MV Peak Gradient:    0.82 m/s  3.2 mmHg             AV Mean Gradient: 3.2  MV Mean Gradient:    mmHg  1.4 mmHg             AV VTI: 22.5 cm       TR Velocity:2.28 m/s  MV Mean Velocity:    AV Area               TR Gradient:20.85 mmHg  0.55 m/s             (Continuity):2.81 PV Peak Velocity: 1.1 m/s  MV Deceleration      cm^2                  PV Peak Gradient: 4.86 mmHg  Time: 161.4 msec                           PV Mean Velocity: 0.79 m/s  MV P1/2t: 37.6 msec  LVOT VTI: 20.1 cm     PV Mean Gradient: 2.9 mmHg  MVA by PHT:5.85 cm^2  MV Area  (continuity): 2.8  cm^2  MV E' Septal  Velocity: 0.06 m/s  MV E' Lateral  Velocity: 11 m/s  [email or web address] [id number]    CT GUIDED NEEDLE PLACEMENT    Result Date: 7/14/2022  PROCEDURE: CT NEEDLE BIOPSY LIVER PERCUTANEOUS; CT GUIDED NDL PLACEMENT 7/14/2022 HISTORY: ORDERING SYSTEM PROVIDED HISTORY: mass TECHNOLOGIST PROVIDED HISTORY: Reason for exam:->mass Multiple hepatic lesions. TECHNIQUE: The patient was placed in the supine position and multiple unenhanced axial images of the liver were performed. Automated exposure control, iterative reconstruction, and/or weight based adjustment of the mA/kV was utilized to reduce the radiation dose to as low as reasonably achievable. CONTRAST: None SEDATION: None FLUOROSCOPY DOSE AND TYPE OR TIME AND EXPOSURES: Total CT fluoroscopy time 6.4 seconds. Total exam .79 mGy-cm. Number of images: 178 [id number] OF PROCEDURE: Informed consent was obtained after a detailed explanation of the procedure including risks, benefits, and alternatives. Universal protocol was observed. Sterile gowns, masks, hats and gloves utilized for maximal sterile barrier. Time-out was called and the patient's order and identity were verified. Under sterile conditions and CT guidance, a 17 gauge guiding needle was placed into the anterior aspect of a lesion in the medial segment of the left hepatic lobe. 4 core biopsy specimens were obtained with an 18 gauge biopsy gun. The patient tolerated the procedure well. FINDINGS: Initial images demonstrate multiple low-density lesions in the liver.   Intra procedural images demonstrate good needle position at the anterior aspect of the lesion in the medial segment of the left hepatic lobe. Post biopsy images show no significant hemorrhage about the biopsy site. Status post CT-guided biopsy of lesion in the left lobe of the liver. CT NEEDLE BIOPSY LIVER PERCUTANEOUS    Result Date: 7/14/2022  PROCEDURE: CT NEEDLE BIOPSY LIVER PERCUTANEOUS; CT GUIDED NDL PLACEMENT 7/14/2022 HISTORY: ORDERING SYSTEM PROVIDED HISTORY: mass TECHNOLOGIST PROVIDED HISTORY: Reason for exam:->mass Multiple hepatic lesions. TECHNIQUE: The patient was placed in the supine position and multiple unenhanced axial images of the liver were performed. Automated exposure control, iterative reconstruction, and/or weight based adjustment of the mA/kV was utilized to reduce the radiation dose to as low as reasonably achievable. CONTRAST: None SEDATION: None FLUOROSCOPY DOSE AND TYPE OR TIME AND EXPOSURES: Total CT fluoroscopy time 6.4 seconds. Total exam .79 mGy-cm. Number of images: 753 SOIZTKYSDAF OF PROCEDURE: Informed consent was obtained after a detailed explanation of the procedure including risks, benefits, and alternatives. Universal protocol was observed. Sterile gowns, masks, hats and gloves utilized for maximal sterile barrier. Time-out was called and the patient's order and identity were verified. Under sterile conditions and CT guidance, a 17 gauge guiding needle was placed into the anterior aspect of a lesion in the medial segment of the left hepatic lobe. 4 core biopsy specimens were obtained with an 18 gauge biopsy gun. The patient tolerated the procedure well. FINDINGS: Initial images demonstrate multiple low-density lesions in the liver. Intra procedural images demonstrate good needle position at the anterior aspect of the lesion in the medial segment of the left hepatic lobe. Post biopsy images show no significant hemorrhage about the biopsy site.      Status post CT-guided biopsy of lesion in the left lobe of the liver. US DUP LOWER EXTREMITY LEFT GENA    Result Date: 7/13/2022  EXAMINATION: DUPLEX VENOUS ULTRASOUND OF THE LEFT LOWER EXTREMITY 7/13/2022 7:33 pm TECHNIQUE: Duplex ultrasound using B-mode/gray scaled imaging and Doppler spectral analysis and color flow was obtained of the deep venous structures of the left lower extremity. COMPARISON: None. HISTORY: ORDERING SYSTEM PROVIDED HISTORY: assess dvt TECHNOLOGIST PROVIDED HISTORY: Reason for exam:->assess dvt What reading provider will be dictating this exam?->CRC FINDINGS: There is occlusive thrombus demonstrated at level of mid left calf appearing within left soleal vein. There is also non compressibility at this level suggesting deep vein thrombosis. Remaining veins of left lower extremity show normal compressibility and Doppler blood flow. Findings are suggestive of occlusive deep vein thrombosis including left soleal vein. Remaining veins of left lower extremity show no evidence of deep vein thrombosis. MRI BRAIN W WO CONTRAST    Result Date: 7/14/2022  EXAMINATION: MRI OF THE BRAIN WITHOUT AND WITH CONTRAST  7/13/2022 9:51 pm TECHNIQUE: Multiplanar multisequence MRI of the head/brain was performed without and with the administration of intravenous contrast. COMPARISON: None. HISTORY: ORDERING SYSTEM PROVIDED HISTORY: Stage IV lung cancer, complete staging TECHNOLOGIST PROVIDED HISTORY: Reason for exam:->Stage IV lung cancer, compelte staging FINDINGS: INTRACRANIAL STRUCTURES/VENTRICLES:  There are few small foci of diffusion restriction involving the bilateral frontal, parietal, and occipital lobes. No mass effect or midline shift. No evidence of an acute intracranial hemorrhage. The ventricles and sulci are normal in size and configuration. The sellar/suprasellar regions appear unremarkable. The normal signal voids within the major intracranial vessels appear maintained.   Periventricular and subcortical white matter T2/FLAIR hyperintensities, consistent microangiopathic change. There are multiple enhancing intraparenchymal lesions. The largest of these measures 2 x 1.6 cm in the left frontal lobe. There is some associated vasogenic edema, mass effect, and sulcal effacement. No midline shift. Lesions also involve the more superior left frontal cortex, right frontal lobe, medial left occipital lobe, and right cerebellar hemisphere. ORBITS: The visualized portion of the orbits demonstrate no acute abnormality. SINUSES: Scattered mucosal thickening in the paranasal sinuses. The mastoid air cells are clear. BONES/SOFT TISSUES: There are enhancing lesions in the midline and left frontal calvarium. 1. There are a few small scattered foci of diffusion restriction in the bilateral supratentorial brain, concerning for small areas of infarction. This is likely embolic given the distribution. 2. Multiple enhancing intraparenchymal lesions, consistent with intracranial metastatic disease. There is associated edema and mass effect in the left frontal lobe without midline shift. 3. Enhancing lesions in the frontal calvarium, concerning for osseous metastatic disease. Assessment:    Principal Problem:    Lung mass  Active Problems:    Lung cancer, primary, with metastasis from lung to other site, left (HCC)    Tobacco use    Paresthesias    Back pain    Elevated troponin    Atypical angina (HCC)  Resolved Problems:    * No resolved hospital problems. *      Plan:  1. Lung mass with local metastasis -consult with pulmonary and oncology.  Oxygen as needed.  DuoNebs as needed.  IR biopsy performed this afternoon. Oncology made a radiation oncology consult. To contact oncology regarding recommendations for left DVT. Heparin on hold from biopsy. Interventional radiologist reported no bleeding at the site after biopsy of the liver was done.   2.  Paresthesias -patient has a borderline high calcium.  We will check an ionized calcium level.  Check a PTH.  Sodium levels and electrolytes are normal.  Blood sugar has been normal.  Hemoglobin A1c normal at 5.4 and sensitive TSH normal at 0.982.  3.  Weight loss -most likely secondary can [place] to #1. Aggie Mcallister will check for metabolic issues such as diabetes and thyroid disease.  Both less likely. Both results are normal.  4.  Elevated troponins - cardiology consult.  Asymptomatic.  ? Need for 2 D ECHO  5.  Tobacco use -NicoDerm 21 mg patch daily.  Supportive care.     Total care time: 18 minutes    NOTE: This report was transcribed using voice recognition software. Every effort was made to ensure accuracy; however, inadvertent computerized transcription errors may be present.     Electronically signed by Ron Antonio MD on 7/14/2022 at 7:30 PM

## 2022-07-14 NOTE — PROGRESS NOTES
Kathleen Oseguera M.D.,[facility]  Adore Sneed D.O., F.A.C.O.I., Mckenna Lamar M.D. Kailyn Lu M.D. Gladis Culver D.O. Daily Pulmonary Progress Note    Patient:  Edwige Urbina 62 y.o. male MRN: [id number]     Date of Service: 7/14/2022      Synopsis     We are following patient for Lung cancer with mets (new)    \"CC\" back pain    Code status: Full      Subjective      Patient was seen and examined. Lying in bed in no acute distress. N.p.o. awaiting CT-guided biopsy of liver lesion. Complains of ongoing left leg cramping. Ultrasound of left lower extremity positive for DVT. Will hold anticoagulation until after procedure. 2D echo completed EF 55 to 60%. Denies cough or mucus production. Patient states he had prior low-grade temps  °F at home several days prior to coming to ED. Review of Systems:  Constitutional: Denies fever, weight loss, night sweats, and fatigue  Skin: Denies pigmentation, dark lesions, and rashes   HEENT: Denies hearing loss, tinnitus, ear drainage, epistaxis, sore throat, and hoarseness. Cardiovascular: Denies palpitations, chest pain, and chest pressure. Respiratory: Denies cough, dyspnea at rest, hemoptysis, apnea, and choking.   Gastrointestinal: Denies nausea, vomiting, poor appetite, diarrhea, heartburn or reflux  Genitourinary: Denies dysuria, frequency, urgency or hematuria  Musculoskeletal: Back pain , hand numbness, left leg cramping  Neurological: Denies dizziness, vertigo, headache, and focal weakness  Psychological: Denies anxiety and depression  Endocrine: Denies heat intolerance and cold intolerance  Hematopoietic/Lymphatic: Denies bleeding problems and blood transfusions    24-hour events:  None    Objective   Vitals: /70   Pulse 86   Temp 97.7 °F (36.5 °C) (Oral)   Resp 18   Ht 5' 9\" (1.753 m)   Wt 145 lb 14.4 oz (66.2 kg)   SpO2 96%   BMI 21.55 kg/m²     I/O:  No intake or output data in the 24 hours ending 07/14/22 1100    CURRENT MEDS :  Scheduled Meds:   pantoprazole  40 mg Oral QAM AC    sodium chloride flush  5-40 mL IntraVENous 2 times per day    nicotine  1 patch TransDERmal Daily    [Held by provider] heparin (porcine)  5,000 Units SubCUTAneous Q8H    ipratropium-albuterol  1 ampule Inhalation Q4H WA       Physical Exam:  General: The patient is lying in bed comfortably without any distress. Breathing is not labored  HEENT: Pupils are equal round and reactive to light, there are no oral lesions and no post-nasal drip .nodule felt near supraclavicular area neck posteriorly  Neck: supple without adenopathy  Cardiovascular: regular rate and rhythm without murmur or gallop  Respiratory: Clear to auscultation bilaterally without wheezing or crackles. Air entry is symmetric  Abdomen: soft, non-tender, non-distended, normal bowel sounds  Extremities: warm,   no clubbing left leg cramping  Skin: no rash or lesion  Neurologic: CN II-XII grossly intact, no focal deficits    Pertinent/ New Labs and Imaging Studies     Imaging Personally Reviewed:    CTA chest  1.  Large, necrotic soft tissue mass in the right lower lobe, which also   encompasses the inferior right hilum, consistent with malignancy.       2.  Metastatic mediastinal and right hilar lymphadenopathy.       3.  Tiny satellite nodules in the right lower lobe, posterior to the   previously described mass.       4.  Multitude of tiny centrilobular nodules and density suspicious for   tree-in-bud opacities in the right middle lobe.  Similar, but much less   significant findings are seen in the superior segment of the right lower   lobe.  Question infective bronchiolitis or other inflammatory disorder.    Tumoral spread cannot be excluded.       5.  Bilateral lung nodules, as described above.  It cannot be determined if   these nodules are neoplastic or inflammatory.       6.  Centrilobular emphysema.       7.  Abdominal findings discussed in full detail on the report from the   patient's CT scan of the abdomen and pelvis performed the same day.  Please   refer to that transcription.       8.  Osseous metastatic disease.             CT abdomen and pelvis  Impression   1.  Large, necrotic soft tissue mass in the right lower lobe of the lungs,   which in Compazine is the inferior right hilum.  This is described in full   detail on the report from the patient's CT scan of the chest performed the   same day.  Please refer to that transcription.       2.  Liver metastases.       3.  A splenic infarct.       4.  Bilateral adrenal gland lesions, which may represent adenomata. Metastatic disease cannot be excluded.  MRI of the adrenal glands can be   considered for further evaluation.       5.  Bilateral renal cortical cysts.       6.  Hypodense areas involving both renal parenchyma, left greater than right. These could represent bilateral infarcts without cortical rim sign, however,   pyelonephritis cannot be excluded.  Metastatic disease is also in the   differential diagnosis. Stephen Colbert correlation is recommended.       7.  Colonic diverticulosis.       8.  Enlarged prostate gland.       9.  Mildly enlarged gastrohepatic lymph node.       10.  Osseous metastases, as described above.            Echo:   Conclusions      Summary   Normal left ventricular chamber size. Normal left ventricular systolic function. Visually estimated LVEF is 55-60 %. No wall motion abnormalities. Normal diastolic function. Normal left atrial pressure. Normal right ventricle structure and function. Normal left atrial size. Normal right atrial size   Unable to estimate PA pressure. No comparison study available.          Ultrasound bilateral lower extremity 7/13/2022      FINDINGS:   There is occlusive thrombus demonstrated at level of mid left calf appearing   within left soleal vein. Cathye Gauss is also non compressibility at this level   suggesting deep vein thrombosis.  Remaining veins of left lower extremity   show normal compressibility and Doppler blood flow.           Impression   Findings are suggestive of occlusive deep vein thrombosis including left   soleal vein.  Remaining veins of left lower extremity show no evidence of   deep vein thrombosis.                 Labs:  Lab Results   Component Value Date/Time    WBC 17.1 07/14/2022 05:15 AM    HGB 9.4 07/14/2022 05:15 AM    HCT 28.0 07/14/2022 05:15 AM    MCV 88.9 07/14/2022 05:15 AM    MCH 29.8 07/14/2022 05:15 AM    MCHC 33.6 07/14/2022 05:15 AM    RDW 13.6 07/14/2022 05:15 AM     07/14/2022 05:15 AM    MPV 9.7 07/14/2022 05:15 AM     Lab Results   Component Value Date/Time     07/14/2022 05:15 AM    K 4.6 07/14/2022 05:15 AM    CL 97 07/14/2022 05:15 AM    CO2 25 07/14/2022 05:15 AM    BUN 17 07/14/2022 05:15 AM    CREATININE 0.8 07/14/2022 05:15 AM    LABALBU 3.0 07/14/2022 05:15 AM    CALCIUM 9.5 07/14/2022 05:15 AM    GFRAA >60 07/14/2022 05:15 AM    LABGLOM >60 07/14/2022 05:15 AM     Lab Results   Component Value Date/Time    PROTIME 13.8 07/12/2022 07:13 PM    INR 1.3 07/12/2022 07:13 PM     No results for input(s): PROBNP in the last 72 hours. Recent Labs     07/13/22  1059   PROCAL 0.20*     This SmartLink has not been configured with any valid records. Micro:  No results for input(s): CULTRESP in the last 72 hours. No results for input(s): LABGRAM in the last 72 hours. No results for input(s): LEGUR in the last 72 hours. Recent Labs     07/13/22  1059   STREPNEUMAGU Presumptive negative- suggests no current or recent  pneumococcal infection. Infection due to Strep pneumoniae cannot be  ruled out since the antigen present in the sample  may be below the detection limit of the test.  Normal Range:Presumptive Negative       Recent Labs     07/13/22  1059   LP1UAG Presumptive Negative -suggesting no recent or current infections  with Legionella pneumophila serogroup 1.   Infection to Legionella cannot be ruled out since other serogroups  and species may cause infection, antigen may not be present in  early infection, or level of antigen may be below the  detection limit. Normal Range: Presumptive Negative            Assessment:    1. Right lung mass with widespread metastasis to liver and bones  2. Mediastinal, right hilar lymphadenopathy  3. Strong family history of lung cancer- father, sister, brother  3. Tree-in-bud changes on CT chest.   5. Ongoing nicotine dependence 0.75 packs/day x 40 years  6. Leukocytosis  7. Elevated D-dimer  8. Elevated troponin  9. Unintentional weight loss-20 pounds    Plan:   10. Monitor off oxygen keep SPO2 greater than 92%  11. Procal 0.20, crp 13.2, sed rate 76, strep pneumo, legionella Ag's-negative. Blood cultures pending. Monitor WBCs 17.1- monitor off antibiotics  12. CT chest with contrast- study limited due to suboptimal opacification of pulmonary arteries. No evidence to suggest PE.  13. Ultrasound L leg  positive for DVT-hold  treatment until after procedure today  14. IR procedure ct guided biopsy of liver lesion to be completed today. 15. 2D echo completed  16. Oncology following, work-up ongoing  17. Tobacco cessation counseling    This plan of care was reviewed in collaboration with Dr. Alise Brown  Electronically signed by ARA Castro CNP on 7/14/2022 at 11:00 AM    I personally saw, examined, and cared for the patient. Labs, medications, radiographs reviewed.  I agree with history exam and plans detailed in NP note with the following additions:    CT liver biopsy today  Also has LLL DVT - defer treatment to [facility]/primary care  Found to have brain metastasis  From a pulmonary standpoint he would be okay for discharge when okay with others    Zaynab Leija MD

## 2022-07-15 ENCOUNTER — HOSPITAL ENCOUNTER (OUTPATIENT)
Dept: RADIATION ONCOLOGY | Age: 62
Discharge: HOME | End: 2022-07-15
Payer: COMMERCIAL

## 2022-07-15 VITALS
HEIGHT: 69 IN | SYSTOLIC BLOOD PRESSURE: 92 MMHG | BODY MASS INDEX: 20.76 KG/M2 | DIASTOLIC BLOOD PRESSURE: 61 MMHG | HEART RATE: 75 BPM | RESPIRATION RATE: 16 BRPM | WEIGHT: 140.2 LBS | OXYGEN SATURATION: 95 % | TEMPERATURE: 98.4 F

## 2022-07-15 DIAGNOSIS — C34.92 LUNG CANCER, PRIMARY, WITH METASTASIS FROM LUNG TO OTHER SITE, LEFT (HCC): Primary | ICD-10-CM

## 2022-07-15 LAB
ALBUMIN SERPL-MCNC: 3.6 G/DL (ref 3.5–5.2)
ALP BLD-CCNC: 207 U/L (ref 40–129)
ALT SERPL-CCNC: 39 U/L (ref 0–40)
ANION GAP SERPL CALCULATED.3IONS-SCNC: 13 MMOL/L (ref 7–16)
AST SERPL-CCNC: 31 U/L (ref 0–39)
BASOPHILS ABSOLUTE: 0.04 E9/L (ref 0–0.2)
BASOPHILS RELATIVE PERCENT: 0.3 % (ref 0–2)
BILIRUB SERPL-MCNC: 0.5 MG/DL (ref 0–1.2)
BUN BLDV-MCNC: 18 MG/DL (ref 6–23)
CALCIUM SERPL-MCNC: 9.9 MG/DL (ref 8.6–10.2)
CHLORIDE BLD-SCNC: 97 MMOL/L (ref 98–107)
CO2: 25 MMOL/L (ref 22–29)
CREAT SERPL-MCNC: 0.8 MG/DL (ref 0.7–1.2)
EOSINOPHILS ABSOLUTE: 0 E9/L (ref 0.05–0.5)
EOSINOPHILS RELATIVE PERCENT: 0 % (ref 0–6)
GFR AFRICAN AMERICAN: >60
GFR NON-AFRICAN AMERICAN: >60 ML/MIN/1.73
GLUCOSE BLD-MCNC: 138 MG/DL (ref 74–99)
HCT VFR BLD CALC: 29.5 % (ref 37–54)
HEMOGLOBIN: 10 G/DL (ref 12.5–16.5)
IMMATURE GRANULOCYTES #: 0.13 E9/L
IMMATURE GRANULOCYTES %: 0.8 % (ref 0–5)
LYMPHOCYTES ABSOLUTE: 1.62 E9/L (ref 1.5–4)
LYMPHOCYTES RELATIVE PERCENT: 10.5 % (ref 20–42)
MCH RBC QN AUTO: 30.2 PG (ref 26–35)
MCHC RBC AUTO-ENTMCNC: 33.9 % (ref 32–34.5)
MCV RBC AUTO: 89.1 FL (ref 80–99.9)
MONOCYTES ABSOLUTE: 1 E9/L (ref 0.1–0.95)
MONOCYTES RELATIVE PERCENT: 6.5 % (ref 2–12)
NEUTROPHILS ABSOLUTE: 12.6 E9/L (ref 1.8–7.3)
NEUTROPHILS RELATIVE PERCENT: 81.9 % (ref 43–80)
PDW BLD-RTO: 13.5 FL (ref 11.5–15)
PLATELET # BLD: 294 E9/L (ref 130–450)
PMV BLD AUTO: 10.7 FL (ref 7–12)
POTASSIUM REFLEX MAGNESIUM: 4.9 MMOL/L (ref 3.5–5)
RBC # BLD: 3.31 E12/L (ref 3.8–5.8)
SODIUM BLD-SCNC: 135 MMOL/L (ref 132–146)
TOTAL PROTEIN: 6.8 G/DL (ref 6.4–8.3)
WBC # BLD: 15.4 E9/L (ref 4.5–11.5)

## 2022-07-15 PROCEDURE — 36415 COLL VENOUS BLD VENIPUNCTURE: CPT

## 2022-07-15 PROCEDURE — 85025 COMPLETE CBC W/AUTO DIFF WBC: CPT

## 2022-07-15 PROCEDURE — 6370000000 HC RX 637 (ALT 250 FOR IP): Performed by: INTERNAL MEDICINE

## 2022-07-15 PROCEDURE — 6360000002 HC RX W HCPCS: Performed by: INTERNAL MEDICINE

## 2022-07-15 PROCEDURE — 2580000003 HC RX 258: Performed by: FAMILY MEDICINE

## 2022-07-15 PROCEDURE — 80053 COMPREHEN METABOLIC PANEL: CPT

## 2022-07-15 PROCEDURE — 94640 AIRWAY INHALATION TREATMENT: CPT

## 2022-07-15 PROCEDURE — 99222 1ST HOSP IP/OBS MODERATE 55: CPT | Performed by: RADIOLOGY

## 2022-07-15 PROCEDURE — 6370000000 HC RX 637 (ALT 250 FOR IP): Performed by: FAMILY MEDICINE

## 2022-07-15 PROCEDURE — 6360000002 HC RX W HCPCS: Performed by: NURSE PRACTITIONER

## 2022-07-15 PROCEDURE — 99239 HOSP IP/OBS DSCHRG MGMT >30: CPT | Performed by: PHYSICIAN ASSISTANT

## 2022-07-15 RX ORDER — DEXAMETHASONE 4 MG/1
4 TABLET ORAL 2 TIMES DAILY WITH MEALS
Qty: 14 TABLET | Refills: 0 | Status: ON HOLD | OUTPATIENT
Start: 2022-07-15 | End: 2022-07-21 | Stop reason: SDUPTHER

## 2022-07-15 RX ORDER — LEVETIRACETAM 500 MG/1
500 TABLET ORAL 2 TIMES DAILY
Qty: 60 TABLET | Refills: 0 | Status: SHIPPED | OUTPATIENT
Start: 2022-07-15

## 2022-07-15 RX ORDER — NICOTINE 21 MG/24HR
1 PATCH, TRANSDERMAL 24 HOURS TRANSDERMAL DAILY
Qty: 30 PATCH | Refills: 0 | Status: SHIPPED | OUTPATIENT
Start: 2022-07-15 | End: 2022-07-18 | Stop reason: ALTCHOICE

## 2022-07-15 RX ORDER — ENOXAPARIN SODIUM 100 MG/ML
1 INJECTION SUBCUTANEOUS 2 TIMES DAILY
Status: DISCONTINUED | OUTPATIENT
Start: 2022-07-15 | End: 2022-07-15 | Stop reason: HOSPADM

## 2022-07-15 RX ORDER — POLYETHYLENE GLYCOL 3350 17 G/17G
17 POWDER, FOR SOLUTION ORAL DAILY PRN
Qty: 527 G | Refills: 0 | Status: SHIPPED | OUTPATIENT
Start: 2022-07-15 | End: 2022-08-14

## 2022-07-15 RX ORDER — HYDROCODONE BITARTRATE AND ACETAMINOPHEN 5; 325 MG/1; MG/1
1 TABLET ORAL EVERY 6 HOURS PRN
Qty: 28 TABLET | Refills: 0 | Status: SHIPPED | OUTPATIENT
Start: 2022-07-15 | End: 2022-07-22

## 2022-07-15 RX ADMIN — LEVETIRACETAM 500 MG: 500 TABLET, FILM COATED ORAL at 10:47

## 2022-07-15 RX ADMIN — Medication 10 ML: at 10:48

## 2022-07-15 RX ADMIN — ENOXAPARIN SODIUM 60 MG: 100 INJECTION SUBCUTANEOUS at 10:47

## 2022-07-15 RX ADMIN — IPRATROPIUM BROMIDE AND ALBUTEROL SULFATE 1 AMPULE: .5; 2.5 SOLUTION RESPIRATORY (INHALATION) at 12:15

## 2022-07-15 RX ADMIN — IPRATROPIUM BROMIDE AND ALBUTEROL SULFATE 1 AMPULE: .5; 2.5 SOLUTION RESPIRATORY (INHALATION) at 08:09

## 2022-07-15 RX ADMIN — PANTOPRAZOLE SODIUM 40 MG: 40 TABLET, DELAYED RELEASE ORAL at 06:18

## 2022-07-15 RX ADMIN — DEXAMETHASONE SODIUM PHOSPHATE 4 MG: 4 INJECTION, SOLUTION INTRA-ARTICULAR; INTRALESIONAL; INTRAMUSCULAR; INTRAVENOUS; SOFT TISSUE at 06:18

## 2022-07-15 NOTE — PROGRESS NOTES
Shannan Hansen M.D.,[facility]  Lizy Heath D.O., F.A.C.O.I., Claudeen Stallion, M.D. Jose Frances M.D. Dean Altman D.O. Daily Pulmonary Progress Note    Patient:  Jacob Prasad 62 y.o. male MRN: [id number]     Date of Service: 7/15/2022      Synopsis     We are following patient for Lung cancer with mets (new)    \"CC\" back pain    Code status: Full      Subjective      Patient was seen and examined. Lying in bed in no acute distress. No complaints with breathing. Tolerated CT-guided liver biopsy yesterday. Started on dexamethasone and Keppra for seizure prophylaxis per oncology team due to finding of brain metastasis. Await radiation oncology eval.  Will need started on anticoagulation for left leg DVT-defer to oncology or primary team.      Review of Systems:  Constitutional: Denies fever, weight loss, night sweats, and fatigue  Skin: Denies pigmentation, dark lesions, and rashes   HEENT: Denies hearing loss, tinnitus, ear drainage, epistaxis, sore throat, and hoarseness. Cardiovascular: Denies palpitations, chest pain, and chest pressure. Respiratory: Denies cough, dyspnea at rest, hemoptysis, apnea, and choking.   Gastrointestinal: Denies nausea, vomiting, poor appetite, diarrhea, heartburn or reflux  Genitourinary: Denies dysuria, frequency, urgency or hematuria  Musculoskeletal: Back pain , hand numbness, left leg cramping  Neurological: Denies dizziness, vertigo, headache, and focal weakness  Psychological: Denies anxiety and depression  Endocrine: Denies heat intolerance and cold intolerance  Hematopoietic/Lymphatic: Denies bleeding problems and blood transfusions    24-hour events:  None    Objective   Vitals: BP (!) 98/54   Pulse 88   Temp 98.1 °F (36.7 °C) (Oral)   Resp 16   Ht 5' 9\" (1.753 m)   Wt 140 lb 3.2 oz (63.6 kg)   SpO2 94%   BMI 20.70 kg/m²     I/O:  No intake or output data in the 24 hours ending 07/15/22 1015    CURRENT MEDS :  Scheduled Meds:   enoxaparin 1 mg/kg SubCUTAneous BID    dexamethasone  4 mg Oral Q12H    levETIRAcetam  500 mg Oral BID    pantoprazole  40 mg Oral QAM AC    sodium chloride flush  5-40 mL IntraVENous 2 times per day    nicotine  1 patch TransDERmal Daily    [Held by provider] heparin (porcine)  5,000 Units SubCUTAneous Q8H    ipratropium-albuterol  1 ampule Inhalation Q4H WA       Physical Exam:  General: The patient is lying in bed comfortably without any distress. Breathing is not labored  HEENT: Pupils are equal round and reactive to light, there are no oral lesions and no post-nasal drip .nodule felt near supraclavicular area neck posteriorly  Neck: supple without adenopathy  Cardiovascular: regular rate and rhythm without murmur or gallop  Respiratory: Clear to auscultation bilaterally without wheezing or crackles. Air entry is symmetric  Abdomen: soft, non-tender, non-distended, normal bowel sounds  Extremities: warm,   no clubbing left leg cramping  Skin: no rash or lesion  Neurologic: CN II-XII grossly intact, no focal deficits    Pertinent/ New Labs and Imaging Studies     Imaging Personally Reviewed:    CTA chest  1. Large, necrotic soft tissue mass in the right lower lobe, which also   encompasses the inferior right hilum, consistent with malignancy. 2.  Metastatic mediastinal and right hilar lymphadenopathy. 3.  Tiny satellite nodules in the right lower lobe, posterior to the   previously described mass. 4.  Multitude of tiny centrilobular nodules and density suspicious for   tree-in-bud opacities in the right middle lobe. Similar, but much less   significant findings are seen in the superior segment of the right lower   lobe. Question infective bronchiolitis or other inflammatory disorder. Tumoral spread cannot be excluded. 5.  Bilateral lung nodules, as described above. It cannot be determined if   these nodules are neoplastic or inflammatory. 6.  Centrilobular emphysema.        7. Abdominal findings discussed in full detail on the report from the   patient's CT scan of the abdomen and pelvis performed the same day. Please   refer to that transcription. 8.  Osseous metastatic disease. ADDENDUM:   A request was made by the referring physician to assess whether there is   pulmonary embolus are not. The study is limited to fully assess for   pulmonary emboli, since there is suboptimal opacification of the pulmonary   arteries and their tributaries. Regardless, there is no evidence to   suggest   pulmonary embolus. CT abdomen and pelvis  Impression   1. Large, necrotic soft tissue mass in the right lower lobe of the lungs,   which in Compazine is the inferior right hilum. This is described in full   detail on the report from the patient's CT scan of the chest performed the   same day. Please refer to that transcription. 2.  Liver metastases. 3.  A splenic infarct. 4.  Bilateral adrenal gland lesions, which may represent adenomata. Metastatic disease cannot be excluded. MRI of the adrenal glands can be   considered for further evaluation. 5.  Bilateral renal cortical cysts. 6.  Hypodense areas involving both renal parenchyma, left greater than right. These could represent bilateral infarcts without cortical rim sign, however,   pyelonephritis cannot be excluded. Metastatic disease is also in the   differential diagnosis. Clinical correlation is recommended. 7.  Colonic diverticulosis. 8.  Enlarged prostate gland. 9.  Mildly enlarged gastrohepatic lymph node. 10.  Osseous metastases, as described above. MRI brain 7/13/2022  Impression   1. There are a few small scattered foci of diffusion restriction in the   bilateral supratentorial brain, concerning for small areas of infarction. This is likely embolic given the distribution.    2. Multiple enhancing intraparenchymal lesions, consistent with intracranial   metastatic disease. There is associated edema and mass effect in the left   frontal lobe without midline shift. 3. Enhancing lesions in the frontal calvarium, concerning for osseous   metastatic disease. Echo:   Conclusions      Summary   Normal left ventricular chamber size. Normal left ventricular systolic function. Visually estimated LVEF is 55-60 %. No wall motion abnormalities. Normal diastolic function. Normal left atrial pressure. Normal right ventricle structure and function. Normal left atrial size. Normal right atrial size   Unable to estimate PA pressure. No comparison study available. Ultrasound bilateral lower extremity 7/13/2022      FINDINGS:   There is occlusive thrombus demonstrated at level of mid left calf appearing   within left soleal vein. There is also non compressibility at this level   suggesting deep vein thrombosis. Remaining veins of left lower extremity   show normal compressibility and Doppler blood flow. Impression   Findings are suggestive of occlusive deep vein thrombosis including left   soleal vein. Remaining veins of left lower extremity show no evidence of   deep vein thrombosis.                  Labs:  Lab Results   Component Value Date/Time    WBC 15.4 07/15/2022 05:55 AM    HGB 10.0 07/15/2022 05:55 AM    HCT 29.5 07/15/2022 05:55 AM    MCV 89.1 07/15/2022 05:55 AM    MCH 30.2 07/15/2022 05:55 AM    MCHC 33.9 07/15/2022 05:55 AM    RDW 13.5 07/15/2022 05:55 AM     07/15/2022 05:55 AM    MPV 10.7 07/15/2022 05:55 AM     Lab Results   Component Value Date/Time     07/15/2022 05:55 AM    K 4.9 07/15/2022 05:55 AM    CL 97 07/15/2022 05:55 AM    CO2 25 07/15/2022 05:55 AM    BUN 18 07/15/2022 05:55 AM    CREATININE 0.8 07/15/2022 05:55 AM    LABALBU 3.6 07/15/2022 05:55 AM    CALCIUM 9.9 07/15/2022 05:55 AM    GFRAA >60 07/15/2022 05:55 AM    LABGLOM >60 07/15/2022 05:55 AM     Lab Results   Component Value Date/Time    PROTIME 13.8 07/12/2022 07:13 PM    INR 1.3 07/12/2022 07:13 PM     No results for input(s): PROBNP in the last 72 hours. Recent Labs     07/13/22  1059   PROCAL 0.20*       This SmartLink has not been configured with any valid records. Micro:  No results for input(s): CULTRESP in the last 72 hours. No results for input(s): LABGRAM in the last 72 hours. No results for input(s): LEGUR in the last 72 hours. Recent Labs     07/13/22  1059   STREPNEUMAGU Presumptive negative- suggests no current or recent  pneumococcal infection. Infection due to Strep pneumoniae cannot be  ruled out since the antigen present in the sample  may be below the detection limit of the test.  Normal Range:Presumptive Negative         Recent Labs     07/13/22  1059   LP1UAG Presumptive Negative -suggesting no recent or current infections  with Legionella pneumophila serogroup 1. Infection to Legionella cannot be ruled out since other serogroups  and species may cause infection, antigen may not be present in  early infection, or level of antigen may be below the  detection limit. Normal Range: Presumptive Negative              Assessment:    Right lung mass with widespread metastasis to liver , brain ,and bones  Mediastinal, right hilar lymphadenopathy  Strong family history of lung cancer- father, sister, brother  Tree-in-bud changes on CT chest.   Ongoing nicotine dependence 0.75 packs/day x 40 years  Leukocytosis  Elevated D-dimer  Elevated troponin  Unintentional weight loss-20 pounds    Plan:   Monitor off oxygen keep SPO2 greater than 92%  Procal 0.20, crp 13.2, sed rate 76, strep pneumo, legionella Ag's-negative. Blood cultures pending. WBCs 15.4- monitor off antibiotics  await path results from CT-guided biopsy of liver lesion 7/14/2022  Needs anticoagulation for LLE DVT. Will defer mgmt to primary or oncology team.   Oncology following, work-up ongoing.   MRI brain with metastatic lesions-started on decadron and keppra. Radiation oncology orders placed for whole brain radiation. Tobacco cessation counseling    This plan of care was reviewed in collaboration with Dr. Gisele Miles  Electronically signed by ARA Worrell CNP on 7/15/2022 at 10:15 AM    I personally saw, examined, and cared for the patient. Labs, medications, radiographs reviewed.  I agree with history exam and plans detailed in NP note with the following additions:    S/p liver biopsy, awaiting results  Radiation oncology consulted  He is on therapeutic lovenox for DVT per oncology  Empirically placed on keppra  We will follow peripherally  Follow up outpatient in about 4 weeks    Tisha Barnes MD

## 2022-07-15 NOTE — CONSULTS
Radiation Oncology Consult Note         7/15/2022    Palmira Whitney  62 y.o.   1960    REFERRING PROVIDER: Kasandra Harris    PCP:  No primary care provider on file. CHIEF COMPLAINT: I have new diagnosis of metastatic cancer    DIAGNOSIS: Metastatic cancer presumably of lung origin with liver, brain, skin and bone metastases. Liver biopsy pending    STAGING: Cancer Staging  No matching staging information was found for the patient. HISTORY OF PRESENT ILLNESS: Mr. Palmira Whitney  is a 62y.o. year old male who went to his PCP with complaints of back pain. Unfortunately test done found a large lesion at the level of the right lung encompassing the right hilum with numerous mediastinal lymph nodes, liver metastases and bone metastases. Also an MRI of the brain found the numerous small metastases infra and supratentorial with the ring and management. The patient yesterday underwent a liver biopsy for histopathology definition. He was seen by Dr. Kasandra Harris who for the patient to me for consideration and discussion of course of whole brain radiation therapy. When I went to the floor the patient was resting comfortably in the bed. He was not complaining of pain, had no headache, no nausea and no vomiting, no diarrhea, no neurological symptoms. PAST MEDICAL HISTORY:  No past medical history on file. PAST SURGICAL HISTORY:      Procedure Laterality Date    CT NEEDLE BIOPSY LIVER PERCUTANEOUS  7/14/2022    CT [facility]       No Known Allergies    MEDICATIONS:  Medications reviewed and reconciled. No current facility-administered medications for this encounter. Current Outpatient Medications   Medication Sig Dispense Refill    HYDROcodone-acetaminophen (NORCO) 5-325 MG per tablet Take 1 tablet by mouth every 6 hours as needed for Pain for up to 7 days. Intended supply: 7 days.  Take lowest dose possible to manage pain 28 tablet 0    apixaban (ELIQUIS) 5 MG TABS tablet Take 1 tablet by mouth in the morning and 1 tablet before bedtime. 60 tablet 0    levETIRAcetam (KEPPRA) 500 MG tablet Take 1 tablet by mouth in the morning and 1 tablet before bedtime. 60 tablet 0    dexamethasone (DECADRON) 4 MG tablet Take 1 tablet by mouth in the morning and 1 tablet in the evening. Take with meals. Do all this for 7 days. 14 tablet 0    polyethylene glycol (GLYCOLAX) 17 g packet Take 17 g by mouth daily as needed for Constipation 527 g 0    nicotine (NICODERM CQ) 21 MG/24HR Place 1 patch onto the skin in the morning.  30 patch 0     Facility-Administered Medications Ordered in Other Encounters   Medication Dose Route Frequency Provider Last Rate Last Admin    enoxaparin (LOVENOX) injection 60 mg  1 mg/kg SubCUTAneous BID Augusta Prim, APRN - CNP   60 mg at 07/15/22 1047    dexamethasone (DECADRON) injection 4 mg  4 mg Oral Q12H Kellie Cerrato MD   4 mg at 07/15/22 0618    levETIRAcetam (KEPPRA) tablet 500 mg  500 mg Oral BID Kellie Cerrato MD   500 mg at 07/15/22 1047    pantoprazole (PROTONIX) tablet 40 mg  40 mg Oral QAM AC John Weaver MD   40 mg at 07/15/22 0618    sodium chloride flush 0.9 % injection 5-40 mL  5-40 mL IntraVENous 2 times per day Lorie Harrington MD   10 mL at 07/15/22 1048    sodium chloride flush 0.9 % injection 5-40 mL  5-40 mL IntraVENous PRN Lorie Harrington MD        0.9 % sodium chloride infusion   IntraVENous PRN Lorie Harrington MD        ondansetron (ZOFRAN-ODT) disintegrating tablet 4 mg  4 mg Oral Q8H PRN Lorie Harrington MD        Or    ondansetron (ZOFRAN) injection 4 mg  4 mg IntraVENous Q6H PRN Lorie Harrington MD        polyethylene glycol (GLYCOLAX) packet 17 g  17 g Oral Daily PRN Lorie Harrington MD        acetaminophen (TYLENOL) tablet 650 mg  650 mg Oral Q6H PRN Lorie Harrington MD   650 mg at 07/14/22 1817    Or    acetaminophen (TYLENOL) suppository 650 mg  650 mg Rectal Q6H PRN Lorie Harrington MD        nicotine (Merril Burows) 21 MG/24HR 1 patch  1 patch TransDERmal Daily Francisco Cooper MD        [facility] by [facility]] heparin (porcine) injection 5,000 Units  5,000 Units SubCUTAneous Nicol Cordero MD   5,000 Units at 07/13/22 0856    ipratropium-albuterol (DUONEB) nebulizer solution 1 ampule  1 ampule Inhalation Q4H Wan Charles MD   1 ampule at 07/15/22 1215    morphine (PF) injection 2 mg  2 mg IntraVENous Q4H PRN Francisco Cooper MD   2 mg at 07/13/22 2021    melatonin tablet 3 mg  3 mg Oral Nightly PRN Francisco Cooper MD   3 mg at 07/13/22 2021    perflutren lipid microspheres (DEFINITY) injection 1.65 mg  1.5 mL IntraVENous ONCE PRN Francisco Cooper MD           FAMILY HISTORY:  No family history on file. SOCIAL HISTORY:       reports that he has been smoking cigarettes. He has been smoking an average of .75 packs per day. He has never used smokeless tobacco..   reports current alcohol use of about 1.0 standard drink per week. .   reports no history of drug use. REVIEW OF SYSTEMS:  Obtained from the patient, chart review and nursing assessment. General ROS: positive for  - fatigue  Please refer to the HPI. The review of systems is otherwise negative    PHYSICAL EXAMINATION:      There were no vitals filed for this visit. Wt Readings from Last 3 Encounters:   07/15/22 140 lb 3.2 oz (63.6 kg)   07/12/22 142 lb 6.4 oz (64.6 kg)       [place] PERFORMANCE STATUS:  100    Constitutional: A well developed, well nourished 62 y.o. male who is alert, oriented, cooperative and in no apparent distress. EENT: EOMI LUCHO. No icterus. No conjunctival injections. External canals are patent and no discharge was appreciated. .    Neck: Supple without thyromegaly  Respiratory: Chest was symmetrical without dullness to percussion. Breath sounds bilaterally were clear to auscultation. No wheezes, rhonchi or rales. Breasts: Deferred    Cardiovascular: Regular without murmur.   Abdomen: Obese, rounded and soft without organomegaly. No rebound, guarding or  rigidity. Lymphatic: Palpable lymph node about 1.5 cm in maximum diameter at the level of the left supraclavicular area  Musculoskeletal: Ambulates without assistance. Normal curvature of the spine. No gross muscle weakness. Muscle size, tone and strength are normal. No involuntary movements. Extremities:  No lower extremity edema, ulcerations, tenderness, varicosities or erythema. Coordination appears adequate. Sensory function appears intact. Skin:  Warm and dry. Examination of color, turgor and pigmentation was relatively normal. No bruises or skin rashes. At least 3 small skin metastases are present at the level of the right lateral neck, right supraclavicular area and right back. Neurological/Psychiatric: General behavior, level of consciousness, thought content is normal. The patient's emotional status is normal.  Cranial nerves II-XII are grossly intact. RADIATION SAFETY AND ONCOLOGIC TREATMENT SUPPORT:    - Previous radiation history:  No  - History of autoimmune or connective tissue disease:  No  - Nutritional support/ PEG:  Not applicable  - Dental evaluation:  Not applicable  -  requested:  Not asked for.  - Oncology Nurse navigator requested:  - Transportation for daily treatment:  Self    TUMOR MARKERS: No results found for: PSA, CEA, , IC1486,     IMAGING REVIEWED:  XR CHEST STANDARD (2 VW)    Result Date: 7/12/2022  Right lung mass projecting posterior to the right pulmonary hilum which is quite suspicious for underlying neoplasm. Contrast-enhanced CT of the chest is recommended. Degenerative cervical and thoracic spondylosis. XR CERVICAL SPINE (2-3 VIEWS)    Result Date: 7/12/2022  Right lung mass projecting posterior to the right pulmonary hilum which is quite suspicious for underlying neoplasm. Contrast-enhanced CT of the chest is recommended. Degenerative cervical and thoracic spondylosis.      XR THORACIC SPINE (3 VIEWS)    Result Date: 7/12/2022  Right lung mass projecting posterior to the right pulmonary hilum which is quite suspicious for underlying neoplasm. Contrast-enhanced CT of the chest is recommended. Degenerative cervical and thoracic spondylosis. CT CHEST W CONTRAST    Addendum Date: 7/13/2022    ADDENDUM: A request was made by the referring physician to assess whether there is pulmonary embolus are not. The study is limited to fully assess for pulmonary emboli, since there is suboptimal opacification of the pulmonary arteries and their tributaries. Regardless, there is no evidence to suggest pulmonary embolus. Result Date: 7/13/2022  1. Large, necrotic soft tissue mass in the right lower lobe, which also encompasses the inferior right hilum, consistent with malignancy. 2.  Metastatic mediastinal and right hilar lymphadenopathy. 3.  Tiny satellite nodules in the right lower lobe, posterior to the previously described mass. 4.  Multitude of tiny centrilobular nodules and density suspicious for tree-in-bud opacities in the right middle lobe. Similar, but much less significant findings are seen in the superior segment of the right lower lobe. Question infective bronchiolitis or other inflammatory disorder. Tumoral spread cannot be excluded. 5.  Bilateral lung nodules, as described above. It cannot be determined if these nodules are neoplastic or inflammatory. 6.  Centrilobular emphysema. 7.  Abdominal findings discussed in full detail on the report from the patient's CT scan of the abdomen and pelvis performed the same day. Please refer to that transcription. 8.  Osseous metastatic disease. CT GUIDED NEEDLE PLACEMENT    Result Date: 7/14/2022  Status post CT-guided biopsy of lesion in the left lobe of the liver. CT ABDOMEN PELVIS W IV CONTRAST Additional Contrast? None    Result Date: 7/12/2022  1.   Large, necrotic soft tissue mass in the right lower lobe of the lungs, which in almost 40 packs-years smoking. He presents in particular with numerous brain metastases that are not symptomatic at this time. Therefore I think that the patient is a good candidate for a course of whole brain radiation therapy. DISCUSSION/PLAN:     Discussed at length with the patient and his wife goals and side effects of a course of whole brain radiation therapy. In particular I have disclosed to them the neurocognitive  effects of radiation therapy. At the end of the discussion the patient voiced understanding and was agreeable with the plan. He signed a consent. He will come next week for simulation. I am planning to add memantine to the treatment to try and decrease the risk of short-term memory impairment. I am planning for 30 Gray in 10 fractions. NCCN guidelines, version 2020 is used to help review treatment recommendations. Procedures and process involved with CT simulation and daily radiation treatments were explained. Toxicities, both expected and less common, were reviewed in detail. Questions were answered to their apparent satisfaction. Thank you for the opportunity to participate in multidisciplinary management of this remarkable and pleasant patient.       Romie Hurst MD    Department of Radiation Oncology  [facility]) [facility]: [phone] (K: [phone])  [facility]) [facility]: [phone] (LG: [phone])  [address] [place] [address]) [facility]:  [phone] (PRATIBHA:  [phone])

## 2022-07-15 NOTE — PROGRESS NOTES
Call placed to Dr Montes's office to see if patient will be seen inpatient today. Waiting on returned call. Returned call, will see patient this afternoon.  Electronically signed by Alice Deras RN on 7/15/2022 at 1:14 PM

## 2022-07-15 NOTE — CARE COORDINATION
Await biopsy results; + LLEXT DVT await choice of AC.currently on Lovenox. plan is home with family, no needs. Await pulm plan. Julio Cesar Memos.

## 2022-07-15 NOTE — PROGRESS NOTES
[facility]  Hematology/Oncology  Consult      Patient Name: Gilma Minor  YOB: 1960  PCP: No primary care provider on file. Referring Provider:      Reason for Consultation:   Chief Complaint   Patient presents with    Back Pain     urgent care found mass in lung on xray, sent from urgent care      Interval history: Patient complaining of pain in his left leg. Found to have left soleal vein DVT. History of Present Illness: This is a 70-year-old male patient a significant smoking history who presented to the ED for evaluation of who presented to the emergency room for evaluation of back pain, arthritis and paresthesias of his right third and left fourth and fifth fingers. CT chest with large necrotic soft tissue mass in the RLL encompasses in the inferior R hilum. Metastatic mediastinal and R hilar lymphadenopathy. Tiny satellite nodules in the RLL. Multitude of tiny centrilobular nodules and density suspicious for tree-in-bud opacities in the RML. BL lung nodules. Osseous metastatic disease. CT A/P large necrotic soft tissue mass in the RLL of the lungs which encompasses the inferior R hilum. Liver metastases with a splenic infarct. BL adrenal gland lesions. Hypodense areas involving both renal parenchyma L>R. Diverticulosis. Enlarged prostate gland. Mildly enlarged gastrohepatic LN. Osseous metastases. Cervical and thoracic spine XR with R lung mass. Degenerative cervical and thoracic spondylosis. Cardiology following for elevated troponin. ECHO with EF at 55-60%. Pulmonology following. Patient is for CT guided biopsy of liver lesion. LFT's with alk phos 164 otherwise unremarkable. CBC with WBC 16.3 hgb 9.4, and platelets 194. Consultation for evaluation of new lung mass with metastatic disease. Diagnostic Data:     History reviewed. No pertinent past medical history.     Patient Active Problem List    Diagnosis Date Noted    Back pain 07/13/2022    Elevated troponin Atypical angina ([facility] [address]     Lung mass 07/12/2022    Lung cancer, primary, with metastasis from lung to other site, left ([address] 07/12/2022    Metastatic malignant neoplasm ([facility])     Shortness of breath     Tobacco use     Paresthesias         Past Surgical History:   Procedure Laterality Date    CT NEEDLE BIOPSY LIVER PERCUTANEOUS  7/14/2022    CT NEEDLE BIOPSY LIVER PERCUTANEOUS 7/14/2022 [facility] CT       Family History  History reviewed. No pertinent family history. Social History    TOBACCO:   reports that he has been smoking cigarettes. He has been smoking an average of .75 packs per day. He has never used smokeless tobacco.  ETOH:   reports current alcohol use of about 1.0 standard drink per week. Home Medications  Prior to Admission medications    Medication Sig Start Date End Date Taking? Authorizing Provider   ibuprofen (ADVIL;MOTRIN) 800 MG tablet Take 800 mg by mouth every 6 hours as needed for Pain   Yes Historical Provider, MD       Allergies  No Known Allergies    Review of Systems: Generalized weakness and back pain. 20 lb weight loss in 3 months. Objective  BP (!) 98/54   Pulse 88   Temp 98.1 °F (36.7 °C) (Oral)   Resp 16   Ht 5' 9\" (1.753 m)   Wt 140 lb 3.2 oz (63.6 kg)   SpO2 94%   BMI 20.70 kg/m²     Physical Exam:   Performance Status:  General: AAO to person, place, time, in no acute distress,   Head and neck : PERRLA, EOMI . Sclera non icteric. Oropharynx : Clear  Neck: no JVD,  no adenopathy  Heart: Regular rate and regular rhythm, no murmur  Lungs: Clear to auscultation   Extremities: No edema,no cyanosis, no clubbing. Abdomen: Soft, non-tender;no masses, no organomegaly  Skin:  No rash. Neurologic:Cranial nerves grossly intact. No focal motor or sensory deficits .     Recent Laboratory Data-   Lab Results   Component Value Date    WBC 15.4 (H) 07/15/2022    HGB 10.0 (L) 07/15/2022    HCT 29.5 (L) 07/15/2022    MCV 89.1 07/15/2022     07/15/2022    LYMPHOPCT 10.5 (L) 07/15/2022    RBC 3.31 (L) 07/15/2022    MCH 30.2 07/15/2022    MCHC 33.9 07/15/2022    RDW 13.5 07/15/2022    NEUTOPHILPCT 81.9 (H) 07/15/2022    MONOPCT 6.5 07/15/2022    BASOPCT 0.3 07/15/2022    NEUTROABS 12.60 (H) 07/15/2022    LYMPHSABS 1.62 07/15/2022    MONOSABS 1.00 (H) 07/15/2022    EOSABS 0.00 (L) 07/15/2022    BASOSABS 0.04 07/15/2022       Lab Results   Component Value Date     07/15/2022    K 4.9 07/15/2022    CL 97 (L) 07/15/2022    CO2 25 07/15/2022    BUN 18 07/15/2022    CREATININE 0.8 07/15/2022    GLUCOSE 138 (H) 07/15/2022    CALCIUM 9.9 07/15/2022    PROT 6.8 07/15/2022    LABALBU 3.6 07/15/2022    BILITOT 0.5 07/15/2022    ALKPHOS 207 (H) 07/15/2022    AST 31 07/15/2022    ALT 39 07/15/2022    LABGLOM >60 07/15/2022    GFRAA >60 07/15/2022       No results found for: IRON, TIBC, FERRITIN        Radiology-    XR CHEST STANDARD (2 VW)    Result Date: 7/12/2022  EXAMINATION: THREE XRAY VIEWS OF THE THORACIC SPINE; 2 XRAY VIEWS OF THE CERVICAL SPINE; TWO XRAY VIEWS OF THE CHEST 7/12/2022 7:41 am COMPARISON: None. HISTORY: ORDERING SYSTEM PROVIDED HISTORY: Dorsalgia of thoracic region TECHNOLOGIST PROVIDED HISTORY: Reason for exam:->mid back pain; ORDERING SYSTEM PROVIDED HISTORY: Cervical radiculopathy TECHNOLOGIST PROVIDED HISTORY: Reason for exam:->back pain, neck pain radiculopathy; ORDERING SYSTEM PROVIDED HISTORY: Dorsalgia of thoracic region TECHNOLOGIST PROVIDED HISTORY: Reason for exam:->mid back pain for 2-3 weeks pain down left arm FINDINGS: Chest: A mass measuring approximately 5 cm in diameter is located posterior to the right pulmonary hilum and is highly suspicious for neoplasm. Contrast-enhanced CT of the chest is recommended for further evaluation. Normal heart and pulmonary vascularity. Neither costophrenic angle is blunted. C-spine: Moderate C6-7 degenerative disc disease. No fracture or dislocation. Normal soft tissues. T-spine:  Moderate multilevel degenerative disc disease. No fracture or dislocation. Right lung mass projecting posterior to the right pulmonary hilum which is quite suspicious for underlying neoplasm. Contrast-enhanced CT of the chest is recommended. Degenerative cervical and thoracic spondylosis. XR CERVICAL SPINE (2-3 VIEWS)    Result Date: 7/12/2022  EXAMINATION: THREE XRAY VIEWS OF THE THORACIC SPINE; 2 XRAY VIEWS OF THE CERVICAL SPINE; TWO XRAY VIEWS OF THE CHEST 7/12/2022 7:41 am COMPARISON: None. HISTORY: ORDERING SYSTEM PROVIDED HISTORY: Dorsalgia of thoracic region TECHNOLOGIST PROVIDED HISTORY: Reason for exam:->mid back pain; ORDERING SYSTEM PROVIDED HISTORY: Cervical radiculopathy TECHNOLOGIST PROVIDED HISTORY: Reason for exam:->back pain, neck pain radiculopathy; ORDERING SYSTEM PROVIDED HISTORY: Dorsalgia of thoracic region TECHNOLOGIST PROVIDED HISTORY: Reason for exam:->mid back pain for 2-3 weeks pain down left arm FINDINGS: Chest: A mass measuring approximately 5 cm in diameter is located posterior to the right pulmonary hilum and is highly suspicious for neoplasm. Contrast-enhanced CT of the chest is recommended for further evaluation. Normal heart and pulmonary vascularity. Neither costophrenic angle is blunted. C-spine: Moderate C6-7 degenerative disc disease. No fracture or dislocation. Normal soft tissues. T-spine: Moderate multilevel degenerative disc disease. No fracture or dislocation. Right lung mass projecting posterior to the right pulmonary hilum which is quite suspicious for underlying neoplasm. Contrast-enhanced CT of the chest is recommended. Degenerative cervical and thoracic spondylosis. XR THORACIC SPINE (3 VIEWS)    Result Date: 7/12/2022  EXAMINATION: THREE XRAY VIEWS OF THE THORACIC SPINE; 2 XRAY VIEWS OF THE CERVICAL SPINE; TWO XRAY VIEWS OF THE CHEST 7/12/2022 7:41 am COMPARISON: None.  HISTORY: ORDERING SYSTEM PROVIDED HISTORY: Dorsalgia of thoracic region TECHNOLOGIST PROVIDED HISTORY: Reason for exam:->mid back pain; ORDERING SYSTEM PROVIDED HISTORY: Cervical radiculopathy TECHNOLOGIST PROVIDED HISTORY: Reason for exam:->back pain, neck pain radiculopathy; ORDERING SYSTEM PROVIDED HISTORY: Dorsalgia of thoracic region TECHNOLOGIST PROVIDED HISTORY: Reason for exam:->mid back pain for 2-3 weeks pain down left arm FINDINGS: Chest: A mass measuring approximately 5 cm in diameter is located posterior to the right pulmonary hilum and is highly suspicious for neoplasm. Contrast-enhanced CT of the chest is recommended for further evaluation. Normal heart and pulmonary vascularity. Neither costophrenic angle is blunted. C-spine: Moderate C6-7 degenerative disc disease. No fracture or dislocation. Normal soft tissues. T-spine: Moderate multilevel degenerative disc disease. No fracture or dislocation. Right lung mass projecting posterior to the right pulmonary hilum which is quite suspicious for underlying neoplasm. Contrast-enhanced CT of the chest is recommended. Degenerative cervical and thoracic spondylosis. CT CHEST W CONTRAST    Addendum Date: 7/13/2022    ADDENDUM: A request was made by the referring physician to assess whether there is pulmonary embolus are not. The study is limited to fully assess for pulmonary emboli, since there is suboptimal opacification of the pulmonary arteries and their tributaries. Regardless, there is no evidence to suggest pulmonary embolus. Result Date: 7/13/2022  EXAMINATION: CT OF THE CHEST WITH CONTRAST 7/12/2022 2:17 pm TECHNIQUE: CT of the chest was performed with the administration of intravenous contrast. Multiplanar reformatted images are provided for review. Automated exposure control, iterative reconstruction, and/or weight based adjustment of the mA/kV was utilized to reduce the radiation dose to as low as reasonably achievable. COMPARISON: None.  HISTORY: ORDERING SYSTEM PROVIDED HISTORY: mass on XR TECHNOLOGIST PROVIDED HISTORY: Reason for exam:->mass on XR Decision Support Exception - unselect if not a suspected or confirmed emergency medical condition->Emergency Medical Condition (MA) FINDINGS: Mediastinum: Multiple, enlarged mediastinal lymph nodes are noted involving the paratracheal and subcarinal spaces. The largest lymph node is identified in the subcarinal space, measuring 1.8 x 1.6 cm in size. This indicates metastatic disease. There is a conglomeration of enlarged right hilar and suprahilar lymph nodes, measuring 3.6 x 2.9 cm in size. This also indicates metastatic disease. No left hilar or axillary lymph nodes are present. The thoracic aorta is minimally atherosclerotic. The remainder of the visualized pulmonary vasculature is unremarkable. The heart is within normal limits in size. The thyroid gland and esophagus are unremarkable. A small amount of decreased attenuation is identified within the anterior trachea, probably representing mucous. Lungs/pleura: There is a large, necrotic soft tissue mass identified in the right lower lobe, which also encompasses the inferior right hilum. It measures 9.2 x 5.6 cm in size. It is spiculated in appearance. This is indicative of malignancy. Subcentimeter satellite nodules are noted in the right lower lobe, posterior to the previously described mass. A multitude of tiny centrilobular nodules and densities suspicious for tree-in-bud opacities are identified in the right middle lobe. Similar, but much less significant findings are noted in the superior segment of the right lower lobe. This could represent infective bronchiolitis or other inflammatory disorder, however, the presence of tumoral spread cannot be excluded. A 0.7 cm ill-defined nodule is seen in the right upper lobe (image 42 and series 302). A smaller nodule is noted in the left upper lobe (image 56 on series 302). Punctate nodules are seen in the left lower lobe (images 97 and 120 on series 302).   It cannot be determined ABDOMEN AND PELVIS WITH CONTRAST 7/12/2022 2:17 pm TECHNIQUE: CT of the abdomen and pelvis was performed with the administration of intravenous contrast. Multiplanar reformatted images are provided for review. Automated exposure control, iterative reconstruction, and/or weight based adjustment of the mA/kV was utilized to reduce the radiation dose to as low as reasonably achievable. COMPARISON: None. HISTORY: ORDERING SYSTEM PROVIDED HISTORY: mass on CXR, back pain TECHNOLOGIST PROVIDED HISTORY: Additional Contrast?->None Reason for exam:->mass on CXR, back pain Decision Support Exception - unselect if not a suspected or confirmed emergency medical condition->Emergency Medical Condition (MA) FINDINGS: Lower Chest: There is a large, necrotic soft tissue mass seen in the right lower lobe, which also encompasses the inferior right hilum. It measures 9.2 x 5.6 cm in size. It is spiculated in appearance. The finding is indicative of malignancy. Further discussion can be found on the report from the patient's CT scan of the chest performed the same time. There is eventration of the posterior left hemidiaphragm Organs: Multiple, solid, small, hypodense lesions are identified throughout the liver. The largest measures 2.4 cm in diameter. This is consistent with metastatic disease. Along the superior aspects of the spleen, there is a poorly defined area of hypodensity, measuring 4.4 by 2.6 by 2.7 cm in size. The hypodensity extends to the capsule. This most likely represents a splenic infarct. The gallbladder, biliary tree, and pancreas are unremarkable. A 1.3 x 0.9 cm hypodensity is seen within the right adrenal gland. The Hounsfield units range from 9-22. This could represent an adenoma, however, the presence of metastatic disease cannot be excluded. 2 solid-appearing, small left adrenal gland lesions are identified, with the largest measuring 1.5 x 1.2 cm. Same differential diagnosis exists.   MRI of the ala.  These is consistent with metastatic disease. 1.  Large, necrotic soft tissue mass in the right lower lobe of the lungs, which in Compazine is the inferior right hilum. This is described in full detail on the report from the patient's CT scan of the chest performed the same day. Please refer to that transcription. 2.  Liver metastases. 3.  A splenic infarct. 4.  Bilateral adrenal gland lesions, which may represent adenomata. Metastatic disease cannot be excluded. MRI of the adrenal glands can be considered for further evaluation. 5.  Bilateral renal cortical cysts. 6.  Hypodense areas involving both renal parenchyma, left greater than right. These could represent bilateral infarcts without cortical rim sign, however, pyelonephritis cannot be excluded. Metastatic disease is also in the differential diagnosis. Clinical correlation is recommended. 7.  Colonic diverticulosis. 8.  Enlarged prostate gland. 9.  Mildly enlarged gastrohepatic lymph node. 10.  Osseous metastases, as described above. ASSESSMENT/PLAN :  81-year-old male  Significant smoking history  RLL mass     - CT chest with large necrotic soft tissue mass in the RLL encompasses in the inferior R hilum. Metastatic mediastinal and R hilar lymphadenopathy. Tiny satellite nodules in the RLL. Multitude of tiny centrilobular nodules and density suspicious for tree-in-bud opacities in the RML. BL lung nodules. Osseous metastatic disease.   -  CT A/P large necrotic soft tissue mass in the RLL of the lungs which encompasses the inferior R hilum. Liver metastases with a splenic infarct. BL adrenal gland lesions. Hypodense areas involving both renal parenchyma L>R. Diverticulosis. Enlarged prostate gland. Mildly enlarged gastrohepatic LN. Osseous metastases. - Cardiology following for elevated troponin. ECHO with EF at 55-60%. - Pulmonology following.  CT guided biopsy of liver lesion  - LFT's with alk phos 164 otherwise unremarkable  - CBC with WBC 16.3 hgb 9.4, and platelets 914  - Need MRI brain to finish staging  - Will follow pathology including molecular markers and PDL1. Outpatient liquid biopsy  - Final systemic therapy recommendations pending above. Will benefit from [place] as well  - Will follow    7/14/22  - RLL mass with CT C/A/P as above. - MRI there are a few small scattered foci of diffusion restriction in the bilateral supratentorial brain concerning for small areas of infarct which is likely unrelated. Multiple enhancing intraparenchymal lesions consistent with intra cranial metastatic disease. There is associated edema and mass-effect in the left frontal lobe without midline shift. Enhancing lesions in the frontal calvarium concerning for osseous metastatic disease. Patient will be  initiated on Decadron to decrease CNS edema  and Keppra for seizure prophylaxis. Radiation oncology will be consulted for whole brain radiation  -Patient is for CT-guided biopsy of liver lesion.  -Pulmonology and cardiology following  - Will follow pathology including molecular markers and PDL1. Outpatient liquid biopsy  - Final systemic therapy recommendations pending above. Will benefit from Sharon [place]field as well  - Will follow    7/15/22  - RLL mass with CT C/A/P as above. - MRI brain with mets as above. - Patient to continue on Decadron to decrease CNS edema and Keppra for seizure prophylaxis. - Radiation oncology consult pending for whole brain radiation  - S/p CT-guided biopsy of liver lesion yesterday. Pathology pending.   - LLE doppler with occlusive DVT. Lovenox BID. To transition to Eliquis on D/C. Brain metastasis usually not a contraindication for anticoagulation except in melanoma, renal cell ca mets. His DVT is isolated and distal but considering his underlying malignancy there is risk for progression.  - Pulmonology and cardiology following  - Will follow pathology including molecular markers and PDL1.  Outpatient liquid biopsy  - Final systemic therapy recommendations pending above. Will benefit from [place] as well  - Will follow      ARA [place] CNP  Electronically signed 7/15/2022 at 10:13 AM  Pt seen and examined.  Note updated  Tierra Gallagher MD

## 2022-07-15 NOTE — PROGRESS NOTES
[facility] Quality Flow/Interdisciplinary Rounds Progress Note        Quality Flow Rounds held on July 15, 2022    Disciplines Attending:  Bedside Nurse, , , and Nursing Unit Leadership    Agnes Ormond was admitted on 7/12/2022 12:31 PM    Anticipated Discharge Date:       Disposition:    Geo Score:  Geo Scale Score: 22    Readmission Risk              Risk of Unplanned Readmission:  [id number]           Discussed patient goal for the day, patient clinical progression, and barriers to discharge. The following Goal(s) of the Day/Commitment(s) have been identified:  New consult Radiation Oncology-check plan. Needs restarted on anticoagulation.       Caro Wolf RN  July 15, 2022

## 2022-07-15 NOTE — PLAN OF CARE
Problem: Discharge Planning  Goal: Discharge to home or other facility with appropriate resources  Outcome: Progressing  Flowsheets (Taken 7/15/2022 0745)  Discharge to home or other facility with appropriate resources: Identify barriers to discharge with patient and caregiver     Problem: Safety - Adult  Goal: Free from fall injury  Outcome: Progressing  Flowsheets (Taken 7/15/2022 0745)  Free From Fall Injury: Instruct family/caregiver on patient safety     Problem: Nutrition Deficit:  Goal: Optimize nutritional status  Outcome: Progressing     Problem: Pain  Goal: Verbalizes/displays adequate comfort level or baseline comfort level  Outcome: Progressing     Problem: ABCDS Injury Assessment  Goal: Absence of physical injury  Outcome: Progressing

## 2022-07-15 NOTE — DISCHARGE SUMMARY
[facility] Physician Discharge Summary       Andre Zavaleta [place]  [address] [facility] [phone]    Schedule an appointment as soon as possible for a visit today  follow up, As needed    Etelvina Rhodes MD  [facility] [phone]    Schedule an appointment as soon as possible for a visit today  follow up, As needed    [facility]) Physicians Pre-Service  [phone]  Call today  to find a family doctor near you      Activity level: As tolerated     Dispo: Home      Condition on discharge: Stable     Patient ID:  Gonzalez Valentin  [id number]  62 y.o.  1960    Admit date: 7/12/2022    Discharge date and time:  7/15/2022  2:42 PM    Admission Diagnoses: Principal Problem:    Lung mass  Active Problems:    Lung cancer, primary, with metastasis from lung to other site, left (HCC)    Tobacco use    Paresthesias    Back pain    Elevated troponin    Atypical angina ([facility] [address]  Resolved Problems:    * No resolved hospital problems. *      Discharge Diagnoses: Principal Problem:    Lung mass  Active Problems:    Lung cancer, primary, with metastasis from lung to other site, left (HCC)    Tobacco use    Paresthesias    Back pain    Elevated troponin    Atypical angina (HCC)  Resolved Problems:    * No resolved hospital problems.  *      Consults:  IP CONSULT TO PULMONOLOGY  IP CONSULT TO ONCOLOGY  IP CONSULT TO CARDIOLOGY  IP CONSULT TO RADIATION ONCOLOGY    Procedures: Liver biopsy    Hospital Course:   Patient Gonzalez Valentin is a 62 y.o. presented with Shortness of breath [R06.02]  Lung mass [R91.8]  Lung cancer, primary, with metastasis from lung to other site, left [facility]) [C34.92]  Metastatic malignant neoplasm, unspecified site ([facility] [address] [C79.9]  Pneumonia due to infectious organism, unspecified laterality, unspecified part of lung [J259]    62year old male who is a tobacco smoker for over 40 years presented to the ED for back pain, low grade fevers, numbness in fingers and weight loss. He wad admitted and treated as below. 1. New Lung mass with local metastasis: Presented to the ED with back pain, low grade temps, numbness in fingers, and weight loss ongoing for 6 weeks. -CT chest in ED showing new large necrotic soft tissue mass in the right lower lobe, which also encompasses the inferior right hilum, consistent with malignancy. Metastatic mediastinal and right hilar lymphadenopathy. -CT A/P showing large necrotic soft tissue mass in the RLL of the lungs which encompasses the inferior R hilum. Liver metastases with a splenic infarct. BL adrenal gland lesions. Hypodense areas involving both renal parenchyma L>R. Diverticulosis. Enlarged prostate gland. Mildly enlarged gastrohepatic LN. Osseous metastases. Pulmonary consulted. -MRI brain with/ without contrast showing scattered foci, concerning for small areas of infarction, likely embolic. Multiple enhancing intraparenchymal lesions consistent with mets, edema and mass effect on the left frontal lobe without midline shift, enhancing lesions in frontal calvarium concerning for osseous mets. -Pulmonary and hem/onc consulted. Continue Decadron to decrease CNS edema and Keppra for seizure prophylaxis. IR procedure CT guided biopsy of liver lesion on 7/14. Radiation oncology consulted for whole brain radiation. 2. LLE DVT: US dup left lower extremity showing occlusive DVT. Continue Lovenox BID with transition to Eliquis on dc. 3. Paresthesias: DVT as above. Mg, phos and b12 unremarkable     4. Weight loss: Likely related to #1. Hga1c 7/13 was 5.4. Thyroid levels unremarkable. No concern for DM/ thyroid disease contributing. 6. Elevated troponin: Cardiology consulted. Troponin 42>39. Echo 7/13 showing EF 55-60%, otherwise unremarkable. 7. Tobacco use: nicotine patch. Encourage cessation. 8. Leukocytosis: Monitor. Procal 0.20. CRP 13.2. SED 76. Strep pneumo/ legionella negative.  BC pending. Monitor off antibiotics. Patient is hemodynamically stable and ready for discharge. He is to follow up with Pulmonary, Hem/onc and Radiation oncology. Discharge Exam:  General Appearance: alert and oriented to person, place and time and in no acute distress  Skin: warm and dry  Head: normocephalic and atraumatic  Eyes: pupils equal, round, and reactive to light, extraocular eye movements intact, conjunctivae normal  Neck: neck supple and non tender without mass   Pulmonary/Chest: clear to auscultation bilaterally- no wheezes, rales or rhonchi, normal air movement, no respiratory distress  Cardiovascular: normal rate, normal S1 and S2 and no carotid bruits  Abdomen: soft, non-tender, non-distended, normal bowel sounds, no masses or organomegaly  Extremities: no cyanosis, no clubbing and no edema  Neurologic: no cranial nerve deficit and speech normal    No intake/output data recorded. No intake/output data recorded. LABS:  Recent Labs     07/13/22 0330 07/14/22 0515 07/15/22  0555    132 135   K 4.3 4.6 4.9   CL 98 97* 97*   CO2 24 25 25   BUN 21 17 18   CREATININE 0.8 0.8 0.8   GLUCOSE 127* 128* 138*   CALCIUM 9.4 9.5 9.9       Recent Labs     07/13/22 0330 07/14/22  0515 07/15/22  0555   WBC 16.3* 17.1* 15.4*   RBC 3.09* 3.15* 3.31*   HGB 9.4* 9.4* 10.0*   HCT 27.7* 28.0* 29.5*   MCV 89.6 88.9 89.1   MCH 30.4 29.8 30.2   MCHC 33.9 33.6 33.9   RDW 13.3 13.6 13.5    298 294   MPV 10.1 9.7 10.7       No results for input(s): POCGLU in the last 72 hours. Imaging:  No results found. Patient Instructions:      Medication List        START taking these medications      apixaban 5 MG Tabs tablet  Commonly known as: Eliquis  Take 1 tablet by mouth in the morning and 1 tablet before bedtime. dexamethasone 4 MG tablet  Commonly known as: DECADRON  Take 1 tablet by mouth in the morning and 1 tablet in the evening. Take with meals. Do all this for 7 days. HYDROcodone-acetaminophen 5-325 MG per tablet  Commonly known as: Norco  Take 1 tablet by mouth every 6 hours as needed for Pain for up to 7 days. Intended supply: 7 days. Take lowest dose possible to manage pain     levETIRAcetam 500 MG tablet  Commonly known as: KEPPRA  Take 1 tablet by mouth in the morning and 1 tablet before bedtime. nicotine 21 MG/24HR  Commonly known as: NICODERM CQ  Place 1 patch onto the skin in the morning.      polyethylene glycol 17 g packet  Commonly known as: GLYCOLAX  Take 17 g by mouth daily as needed for Constipation            STOP taking these medications      ibuprofen 800 MG tablet  Commonly known as: ADVIL;MOTRIN               Where to Get Your Medications        These medications were sent to [address] [facility], [address] [facility] [phone] [address] [facility]      Phone: [phone]   apixaban 5 MG Tabs tablet  dexamethasone 4 MG tablet  HYDROcodone-acetaminophen 5-325 MG per tablet  levETIRAcetam 500 MG tablet  nicotine 21 MG/24HR  polyethylene glycol 17 g packet           Note that more than 30 minutes was spent in preparing discharge papers, discussing discharge with patient, medication review, etc.    Signed:  Electronically signed by Samantha Mueller PA-C on 7/15/2022 at 2:42 PM

## 2022-07-15 NOTE — PROGRESS NOTES
Discharge paperwork initiated in the computer. Care plans and educations resolved. Follow ups added and med details completed on the AVS for discharge home today.

## 2022-07-17 LAB
BLOOD CULTURE, ROUTINE: NORMAL
CULTURE, BLOOD 2: NORMAL

## 2022-07-18 ENCOUNTER — HOSPITAL ENCOUNTER (EMERGENCY)
Age: 62
Discharge: ANOTHER HOSPITAL | End: 2022-07-19
Attending: EMERGENCY MEDICINE
Payer: COMMERCIAL

## 2022-07-18 ENCOUNTER — APPOINTMENT (OUTPATIENT)
Dept: GENERAL RADIOLOGY | Age: 62
End: 2022-07-18
Payer: COMMERCIAL

## 2022-07-18 ENCOUNTER — TELEPHONE (OUTPATIENT)
Dept: OTHER | Facility: CLINIC | Age: 62
End: 2022-07-18

## 2022-07-18 ENCOUNTER — APPOINTMENT (OUTPATIENT)
Dept: CT IMAGING | Age: 62
End: 2022-07-18
Payer: COMMERCIAL

## 2022-07-18 DIAGNOSIS — T66.XXXA ADVERSE EFFECT OF RADIATION, INITIAL ENCOUNTER: Primary | ICD-10-CM

## 2022-07-18 DIAGNOSIS — I63.9 CEREBROVASCULAR ACCIDENT (CVA), UNSPECIFIED MECHANISM (HCC): Primary | ICD-10-CM

## 2022-07-18 DIAGNOSIS — R41.82 ALTERED MENTAL STATUS, UNSPECIFIED ALTERED MENTAL STATUS TYPE: ICD-10-CM

## 2022-07-18 LAB
ALBUMIN SERPL-MCNC: 3.5 G/DL (ref 3.5–5.2)
ALP BLD-CCNC: 209 U/L (ref 40–129)
ALT SERPL-CCNC: 81 U/L (ref 0–40)
AMMONIA: 61.7 UMOL/L (ref 16–60)
ANION GAP SERPL CALCULATED.3IONS-SCNC: 13 MMOL/L (ref 7–16)
APTT: 21.2 SEC (ref 24.5–35.1)
AST SERPL-CCNC: 53 U/L (ref 0–39)
B.E.: 1.2 MMOL/L (ref -3–3)
BASOPHILS ABSOLUTE: 0 E9/L (ref 0–0.2)
BASOPHILS RELATIVE PERCENT: 0 % (ref 0–2)
BILIRUB SERPL-MCNC: 0.5 MG/DL (ref 0–1.2)
BUN BLDV-MCNC: 28 MG/DL (ref 6–23)
CALCIUM SERPL-MCNC: 10 MG/DL (ref 8.6–10.2)
CHLORIDE BLD-SCNC: 96 MMOL/L (ref 98–107)
CHP ED QC CHECK: NORMAL
CO2: 26 MMOL/L (ref 22–29)
COHB: 1.2 % (ref 0–1.5)
CREAT SERPL-MCNC: 0.8 MG/DL (ref 0.7–1.2)
CRITICAL: ABNORMAL
DATE ANALYZED: ABNORMAL
DATE OF COLLECTION: ABNORMAL
EOSINOPHILS ABSOLUTE: 0 E9/L (ref 0.05–0.5)
EOSINOPHILS RELATIVE PERCENT: 0 % (ref 0–6)
GFR AFRICAN AMERICAN: >60
GFR NON-AFRICAN AMERICAN: >60 ML/MIN/1.73
GLUCOSE BLD-MCNC: 158 MG/DL
GLUCOSE BLD-MCNC: 158 MG/DL (ref 74–99)
HCO3: 25.7 MMOL/L (ref 22–26)
HCT VFR BLD CALC: 31.2 % (ref 37–54)
HEMOGLOBIN: 10.5 G/DL (ref 12.5–16.5)
HHB: 6 % (ref 0–5)
INR BLD: 1.4
LAB: ABNORMAL
LYMPHOCYTES ABSOLUTE: 1.26 E9/L (ref 1.5–4)
LYMPHOCYTES RELATIVE PERCENT: 6.9 % (ref 20–42)
Lab: ABNORMAL
MCH RBC QN AUTO: 30.4 PG (ref 26–35)
MCHC RBC AUTO-ENTMCNC: 33.7 % (ref 32–34.5)
MCV RBC AUTO: 90.4 FL (ref 80–99.9)
METHB: 0.3 % (ref 0–1.5)
MODE: ABNORMAL
MONOCYTES ABSOLUTE: 0.9 E9/L (ref 0.1–0.95)
MONOCYTES RELATIVE PERCENT: 5.2 % (ref 2–12)
NEUTROPHILS ABSOLUTE: 15.66 E9/L (ref 1.8–7.3)
NEUTROPHILS RELATIVE PERCENT: 87.1 % (ref 43–80)
NUCLEATED RED BLOOD CELLS: 0 /100 WBC
O2 CONTENT: 14.4 ML/DL
O2 SATURATION: 93.9 % (ref 92–98.5)
O2HB: 92.5 % (ref 94–97)
OPERATOR ID: 166
PATIENT TEMP: 37 C
PCO2: 40.4 MMHG (ref 35–45)
PDW BLD-RTO: 14.4 FL (ref 11.5–15)
PH BLOOD GAS: 7.42 (ref 7.35–7.45)
PLATELET # BLD: 389 E9/L (ref 130–450)
PMV BLD AUTO: 9.9 FL (ref 7–12)
PO2: 71.5 MMHG (ref 75–100)
POTASSIUM REFLEX MAGNESIUM: 3.9 MMOL/L (ref 3.5–5)
PROMYELOCYTES PERCENT: 0.8 % (ref 0–0)
PROTHROMBIN TIME: 14.9 SEC (ref 9.3–12.4)
RBC # BLD: 3.45 E12/L (ref 3.8–5.8)
RBC # BLD: NORMAL 10*6/UL
SODIUM BLD-SCNC: 135 MMOL/L (ref 132–146)
SOURCE, BLOOD GAS: ABNORMAL
THB: 11 G/DL (ref 11.5–16.5)
TIME ANALYZED: 1150
TOTAL PROTEIN: 7.4 G/DL (ref 6.4–8.3)
TROPONIN, HIGH SENSITIVITY: 68 NG/L (ref 0–11)
TROPONIN, HIGH SENSITIVITY: 87 NG/L (ref 0–11)
WBC # BLD: 18 E9/L (ref 4.5–11.5)

## 2022-07-18 PROCEDURE — 85025 COMPLETE CBC W/AUTO DIFF WBC: CPT

## 2022-07-18 PROCEDURE — 96375 TX/PRO/DX INJ NEW DRUG ADDON: CPT

## 2022-07-18 PROCEDURE — 93005 ELECTROCARDIOGRAM TRACING: CPT | Performed by: EMERGENCY MEDICINE

## 2022-07-18 PROCEDURE — 82805 BLOOD GASES W/O2 SATURATION: CPT

## 2022-07-18 PROCEDURE — 2580000003 HC RX 258: Performed by: EMERGENCY MEDICINE

## 2022-07-18 PROCEDURE — 84484 ASSAY OF TROPONIN QUANT: CPT

## 2022-07-18 PROCEDURE — 82140 ASSAY OF AMMONIA: CPT

## 2022-07-18 PROCEDURE — 70450 CT HEAD/BRAIN W/O DYE: CPT

## 2022-07-18 PROCEDURE — 99285 EMERGENCY DEPT VISIT HI MDM: CPT

## 2022-07-18 PROCEDURE — 71045 X-RAY EXAM CHEST 1 VIEW: CPT

## 2022-07-18 PROCEDURE — 6360000002 HC RX W HCPCS: Performed by: EMERGENCY MEDICINE

## 2022-07-18 PROCEDURE — 96365 THER/PROPH/DIAG IV INF INIT: CPT

## 2022-07-18 PROCEDURE — 85610 PROTHROMBIN TIME: CPT

## 2022-07-18 PROCEDURE — 96361 HYDRATE IV INFUSION ADD-ON: CPT

## 2022-07-18 PROCEDURE — 6370000000 HC RX 637 (ALT 250 FOR IP): Performed by: NURSE PRACTITIONER

## 2022-07-18 PROCEDURE — 85730 THROMBOPLASTIN TIME PARTIAL: CPT

## 2022-07-18 PROCEDURE — 80053 COMPREHEN METABOLIC PANEL: CPT

## 2022-07-18 RX ORDER — LEVETIRACETAM 5 MG/ML
500 INJECTION INTRAVASCULAR ONCE
Status: COMPLETED | OUTPATIENT
Start: 2022-07-18 | End: 2022-07-18

## 2022-07-18 RX ORDER — 0.9 % SODIUM CHLORIDE 0.9 %
1000 INTRAVENOUS SOLUTION INTRAVENOUS ONCE
Status: COMPLETED | OUTPATIENT
Start: 2022-07-18 | End: 2022-07-18

## 2022-07-18 RX ORDER — LORAZEPAM 1 MG/1
2 TABLET ORAL ONCE
Status: COMPLETED | OUTPATIENT
Start: 2022-07-18 | End: 2022-07-18

## 2022-07-18 RX ORDER — MEMANTINE HYDROCHLORIDE 5 MG/1
5 TABLET ORAL 2 TIMES DAILY
Qty: 42 TABLET | Refills: 0 | Status: ON HOLD | OUTPATIENT
Start: 2022-07-18 | End: 2022-07-21 | Stop reason: HOSPADM

## 2022-07-18 RX ORDER — MEMANTINE HYDROCHLORIDE 10 MG/1
10 TABLET ORAL 2 TIMES DAILY
Qty: 60 TABLET | Refills: 4 | Status: SHIPPED | OUTPATIENT
Start: 2022-07-18

## 2022-07-18 RX ORDER — DEXAMETHASONE SODIUM PHOSPHATE 4 MG/ML
4 INJECTION, SOLUTION INTRA-ARTICULAR; INTRALESIONAL; INTRAMUSCULAR; INTRAVENOUS; SOFT TISSUE ONCE
Status: COMPLETED | OUTPATIENT
Start: 2022-07-18 | End: 2022-07-18

## 2022-07-18 RX ADMIN — LEVETIRACETAM 500 MG: 5 INJECTION INTRAVENOUS at 22:47

## 2022-07-18 RX ADMIN — LORAZEPAM 2 MG: 1 TABLET ORAL at 22:26

## 2022-07-18 RX ADMIN — DEXAMETHASONE SODIUM PHOSPHATE 4 MG: 4 INJECTION, SOLUTION INTRAMUSCULAR; INTRAVENOUS at 22:47

## 2022-07-18 RX ADMIN — SODIUM CHLORIDE 1000 ML: 9 INJECTION, SOLUTION INTRAVENOUS at 11:57

## 2022-07-18 ASSESSMENT — ENCOUNTER SYMPTOMS
ABDOMINAL PAIN: 0
SHORTNESS OF BREATH: 0
NAUSEA: 0
VOMITING: 0
EYE REDNESS: 0

## 2022-07-18 NOTE — ED PROVIDER NOTES
Chief complaint: Altered mental status      HPI:  7/18/22, Time: 11:42 AM EDT    HPI             Kristen Morton is a 62 y.o. male presenting to the ED for altered mental status. The history is obtained from the patient as well as the patient's medical record. The patient is presenting emergency department the chief complaint of altered mental status. The patient was recently admitted and diagnosed with metastatic lung cancer. He did have diffuse metastasis including intracranially. The patient was started on Decadron as well as Keppra. He also was noted to have a DVT so was started on Eliquis. The patient was discharged home was his normal self. He began approximately 24 hours prior to arrival with altered mental status. The wife states that he went downstairs and was confused. He is asking multiple questions about his breakfast and confused on how to eat the breakfast.  States he has also had increasing fatigue. This is moderate in severity. Nothing makes  it better. Nothing makes it worse. No treatment prior to arrival.  Upon arrival the patient offers no complaints. There is been no reported fevers, chills, chest pain, nausea, vomiting or abdominal pain. The patient does follow with Dr. Raj Edwards for hematology oncology. He is currently being set up for whole brain radiation as well as chemotherapy. He has not received any treatments to this point as this was a very recent diagnosis last week. ROS:   Review of Systems   Constitutional:  Positive for fatigue. Negative for chills and fever. HENT:  Negative for congestion. Eyes:  Negative for redness. Respiratory:  Negative for shortness of breath. Cardiovascular:  Negative for chest pain. Gastrointestinal:  Negative for abdominal pain, nausea and vomiting. Genitourinary:  Negative for dysuria. Musculoskeletal:  Negative for arthralgias. Skin:  Negative for rash. Neurological:  Negative for light-headedness. Psychiatric/Behavioral:  Positive for confusion. All other systems reviewed and are negative.    --------------------------------------------- PAST HISTORY ---------------------------------------------  Past Medical History:  has no past medical history on file. Past Surgical History:  has a past surgical history that includes CT NEEDLE BIOPSY LIVER PERCUTANEOUS (7/14/2022). Social History:  reports that he has been smoking cigarettes. He has been smoking an average of .75 packs per day. He has never used smokeless tobacco. He reports current alcohol use of about 1.0 standard drink per week. He reports that he does not use drugs. Family History: family history is not on file. The patients home medications have been reviewed. Allergies: Patient has no known allergies. ---------------------------------------------------PHYSICAL EXAM--------------------------------------      Constitutional/General: Alert and oriented x3, well appearing non toxic in NAD  Head: Normocephalic and atraumatic  Mouth: Oropharynx clear, handling secretions, no trismus  Neck: Supple, full ROM, no meningeal sign  Pulmonary: Lungs clear to auscultation bilaterally, no wheezes, rales, or rhonchi. Not in respiratory distress  Cardiovascular:  Regular rate. Regular rhythm. No murmurs  Chest: no chest wall tenderness  Abdomen: Soft. Non tender. Non distended. No rebound, guarding, or rigidity. No pulsatile masses appreciated. Musculoskeletal: Moves all extremities x 4. Warm and well perfused, no clubbing, cyanosis, or edema. Capillary refill <3 seconds  Skin: warm and dry. No rashes.    Neurologic: GCS 14- on off for confusion, cranial nerves II to XII grossly intact bilaterally, 5 out of 5 muscle strength bilateral upper and lower extremity  Psych: Normal Affect    NIH Stroke Scale/Score at time of initial evaluation:  1A: Level of Consciousness 0 - alert; keenly responsive   1B: Ask Month and Age 0 - answers both questions correctly   1C: Tell Patient To Open and Close Eyes, then Hand  Squeeze 0 - performs both tasks correctly   2: Test Horizontal Extraocular Movements 0 - normal   3: Test Visual Fields 0 - no visual loss   4: Test Facial Palsy 0 - normal symmetric movement   5A: Test Left Arm Motor Drift 0 - no drift, limb holds 90 (or 45) degrees for full 10 seconds   5B: Test Right Arm Motor Drift 0 - no drift, limb holds 90 (or 45) degrees for full 10 seconds   6A: Test Left Leg Motor Drift 0 - no drift; leg holds 30 degree position for full 5 seconds   6B: Test Right Leg Motor Drift 0 - no drift; leg holds 30 degree position for full 5 seconds   7: Test Limb Ataxia   (FNF/Heel-Shin) 0 - absent   8: Test Sensation 0 - normal; no sensory loss   9: Test Language/Aphasia 0 - no aphasia, normal   10: Test Dysarthria 0 - normal   11: Test Extinction/Inattention 0 - no abnormality   Total Score: 0   7/18/22 at 1130 am      Acute CVA Core Measures:      - t-PA Eligibility: IV t-PA was considered and not given due to violations in inclusion criteria including stroke onset was greater than 3 hours prior to presentation and mild/rapidly resolving deficit         -------------------------------------------------- RESULTS -------------------------------------------------  I have personally reviewed all laboratory and imaging results for this patient. Results are listed below.      LABS:  Results for orders placed or performed during the hospital encounter of 07/18/22   CBC with Auto Differential   Result Value Ref Range    WBC 18.0 (H) 4.5 - 11.5 E9/L    RBC 3.45 (L) 3.80 - 5.80 E12/L    Hemoglobin 10.5 (L) 12.5 - 16.5 g/dL    Hematocrit 31.2 (L) 37.0 - 54.0 %    MCV 90.4 80.0 - 99.9 fL    MCH 30.4 26.0 - 35.0 pg    MCHC 33.7 32.0 - 34.5 %    RDW 14.4 11.5 - 15.0 fL    Platelets 516 689 - 893 E9/L    MPV 9.9 7.0 - 12.0 fL    Neutrophils % 87.1 (H) 43.0 - 80.0 %    Lymphocytes % 6.9 (L) 20.0 - 42.0 %    Monocytes % 5.2 2.0 - 12.0 %    Eosinophils % 0.0 0.0 - 6.0 %    Basophils % 0.0 0.0 - 2.0 %    Neutrophils Absolute 15.66 (H) 1.80 - 7.30 E9/L    Lymphocytes Absolute 1.26 (L) 1.50 - 4.00 E9/L    Monocytes Absolute 0.90 0.10 - 0.95 E9/L    Eosinophils Absolute 0.00 (L) 0.05 - 0.50 E9/L    Basophils Absolute 0.00 0.00 - 0.20 E9/L    Promyelocytes Percent 0.8 0 - 0 %    nRBC 0.0 /100 WBC    RBC Morphology Normal    Comprehensive Metabolic Panel w/ Reflex to MG   Result Value Ref Range    Sodium 135 132 - 146 mmol/L    Potassium reflex Magnesium 3.9 3.5 - 5.0 mmol/L    Chloride 96 (L) 98 - 107 mmol/L    CO2 26 22 - 29 mmol/L    Anion Gap 13 7 - 16 mmol/L    Glucose 158 (H) 74 - 99 mg/dL    BUN 28 (H) 6 - 23 mg/dL    CREATININE 0.8 0.7 - 1.2 mg/dL    GFR Non-African American >60 >=60 mL/min/1.73    GFR African American >60     Calcium 10.0 8.6 - 10.2 mg/dL    Total Protein 7.4 6.4 - 8.3 g/dL    Albumin 3.5 3.5 - 5.2 g/dL    Total Bilirubin 0.5 0.0 - 1.2 mg/dL    Alkaline Phosphatase 209 (H) 40 - 129 U/L    ALT 81 (H) 0 - 40 U/L    AST 53 (H) 0 - 39 U/L   Troponin   Result Value Ref Range    Troponin, High Sensitivity 68 (H) 0 - 11 ng/L   Ammonia   Result Value Ref Range    Ammonia 61.7 (H) 16.0 - 60.0 umol/L   Protime-INR   Result Value Ref Range    Protime 14.9 (H) 9.3 - 12.4 sec    INR 1.4    APTT   Result Value Ref Range    aPTT 21.2 (L) 24.5 - 35.1 sec   Blood Gas, Arterial   Result Value Ref Range    Date Analyzed [id number]     Time Analyzed 9417     Source: Blood Arterial     pH, Blood Gas 7.422 7.350 - 7.450    PCO2 40.4 35.0 - 45.0 mmHg    PO2 71.5 (L) 75.0 - 100.0 mmHg    HCO3 25.7 22.0 - 26.0 mmol/L    B.E. 1.2 -3.0 - 3.0 mmol/L    O2 Sat 93.9 92.0 - 98.5 %    O2Hb 92.5 (L) 94.0 - 97.0 %    COHb 1.2 0.0 - 1.5 %    MetHb 0.3 0.0 - 1.5 %    O2 Content 14.4 mL/dL    HHb 6.0 (H) 0.0 - 5.0 %    tHb (est) 11.0 (L) 11.5 - 16.5 g/dL    Mode RA     Date Of Collection      Time Collected      Pt Temp 37.0 C     ID [id number]     Lab 05854 Critical(s) Notified . No Critical Values    Troponin   Result Value Ref Range    Troponin, High Sensitivity 87 (H) 0 - 11 ng/L   POCT Glucose   Result Value Ref Range    Glucose 158 mg/dL    QC OK? cmp    EKG 12 Lead   Result Value Ref Range    Ventricular Rate 52 BPM    Atrial Rate 52 BPM    P-R Interval 122 ms    QRS Duration 108 ms    Q-T Interval 466 ms    QTc Calculation (Bazett) 433 ms    P Axis 16 degrees    R Axis -32 degrees    T Axis 22 degrees       RADIOLOGY:  Interpreted by Radiologist.  XR CHEST PORTABLE   Final Result   1. No interval change in the large right perihilar mass   2. There are no findings of pneumonia within the right upper lobe or left   lung. CT HEAD WO CONTRAST   Final Result   Multifocal areas of low attenuation as follows: A new large area in the right   temporoparietal occipital region (highly suspicious for acute infarct), 2.2   cm focus in the left occipital lobe (corresponds to previously noted area of   focal encephalomalacia), and 2.9 cm focus (corresponding to previously noted   enhancing mass). There is no significant midline shift. Multiple lytic lesions in the frontal calvarium, compatible with previous   assessment of skeletal metastases. Further evaluation with contrast enhanced brain MRI and/or   perfusion/diffusion study commended. Critical results were called by Dr. Herminio Duverney to Dr. Bharti Potts  on 7/18/2022 at   12:29 p.m. EKG:  This EKG is signed and interpreted by me. Sinus bradycardia rate of 52, no ST segment elevation or depression, QRS 78 MS,  MS  Interpreted by me      ------------------------- NURSING NOTES AND VITALS REVIEWED ---------------------------   The nursing notes within the ED encounter and vital signs as below have been reviewed by myself.   /70   Pulse 88   Temp 98.9 °F (37.2 °C)   Resp 18   Ht 5' 9\" (1.753 m)   Wt 140 lb (63.5 kg)   SpO2 93%   BMI 20.67 kg/m²   Oxygen Saturation Interpretation: Normal    The patients available past medical records and past encounters were reviewed. ------------------------------ ED COURSE/MEDICAL DECISION MAKING----------------------  Medications   0.9 % sodium chloride bolus (0 mLs IntraVENous Stopped 7/18/22 1333)   LORazepam (ATIVAN) tablet 2 mg (2 mg Oral Given 7/18/22 2226)   dexamethasone (DECADRON) injection 4 mg (4 mg IntraVENous Given 7/18/22 2247)   levETIRAcetam (KEPPRA) 500 mg/100 mL IVPB (0 mg IntraVENous Stopped 7/18/22 2306)             Medical Decision Making:   I, Dr. Ivania Minor am the primary physician of record. Rosamaria Villarreal is a 62 y.o. male who presents to the ED for altered mental status. Differential diagnosis includes but not limited to TIA, CVA, intracranial hemorrhage, electrolyte derangement. The patient did arrive with NIH stroke scale of 0. Altered mental status has been greater than 24 hours prior to arrival.  Patient did have labs and imaging which were reviewed. The patient did have a CBC remarkable for leukocytosis of 18.0, patient has recently been on steroids for intracranial metastasis, CMP with mildly elevated BUN, patient given IV fluids high-sensitivity troponin initially 68 EKG with no ischemic changes. Patient did have CT head demonstrating for new large area in the right temporoparietal occipital region concerning for acute infarct. This has been greater than 24 hours prior to arrival and the patient's last known well. Patient did have a negative sugar scale of 0 although is agitated and mildly confused. The case was discussed with the patient's wife. Patient and wife are agreeable to transfer to [facility] for further treatment and evaluation. Re-Evaluations/Consultations:             ED Course as of 07/19/22 1429   Mon Jul 18, 2022   1352 Patient is in the bed no acute distress.   Results of today discussed with the patient they are agreeable to transfer [facility]  [MT] ED Course User Index  [MT] Tonja Tripp DO       Time: 65: Spoke with Dr. Sherrell Delgado she will accept the patient for transfer. This patient's ED course included: History, physical examination, reevaluation prior to disposition, labs, imaging, telemetry monitoring, EKG, IV medications      This patient has remained hemodynamically stable during their ED course. Critical care:  Critical Care: Please note that the withdrawal or failure to initiate urgent interventions for this patient would likely result in a life threatening deterioration or permanent disability. Accordingly this patient received 32 minutes of critical care time, excluding separately billable procedures. Counseling: The emergency provider has spoken with the patient and discussed todays results, in addition to providing specific details for the plan of care and counseling regarding the diagnosis and prognosis. Questions are answered at this time and they are agreeable with the plan.       --------------------------------- IMPRESSION AND DISPOSITION ---------------------------------    IMPRESSION  1. Cerebrovascular accident (CVA), unspecified mechanism ([facility] [address]    2. Altered mental status, unspecified altered mental status type        DISPOSITION  Disposition: Transfer to [facility] neuro floor  Patient condition is stable        NOTE: This report was transcribed using voice recognition software.  Every effort was made to ensure accuracy; however, inadvertent computerized transcription errors may be present         Tonja Tripp DO  07/19/22 1428

## 2022-07-18 NOTE — TELEPHONE ENCOUNTER
Writer contacted ED provider to inform of 30 day readmission risk. ED provider informed writer of intention to discharge and follow up recommended for outpatient care in 4 weeks.        Call Back: If you need to call back to inform of disposition you can contact me at [phone]

## 2022-07-19 ENCOUNTER — HOSPITAL ENCOUNTER (INPATIENT)
Age: 62
LOS: 2 days | Discharge: HOME | DRG: 065 | End: 2022-07-21
Attending: FAMILY MEDICINE | Admitting: INTERNAL MEDICINE
Payer: COMMERCIAL

## 2022-07-19 VITALS
BODY MASS INDEX: 20.73 KG/M2 | HEIGHT: 69 IN | DIASTOLIC BLOOD PRESSURE: 70 MMHG | TEMPERATURE: 98.9 F | WEIGHT: 140 LBS | RESPIRATION RATE: 18 BRPM | OXYGEN SATURATION: 93 % | SYSTOLIC BLOOD PRESSURE: 123 MMHG | HEART RATE: 88 BPM

## 2022-07-19 PROBLEM — R41.82 AMS (ALTERED MENTAL STATUS): Status: ACTIVE | Noted: 2022-07-19

## 2022-07-19 LAB
TROPONIN, HIGH SENSITIVITY: 110 NG/L (ref 0–11)
TROPONIN, HIGH SENSITIVITY: 120 NG/L (ref 0–11)
TROPONIN, HIGH SENSITIVITY: 97 NG/L (ref 0–11)

## 2022-07-19 PROCEDURE — 1200000000 HC SEMI PRIVATE

## 2022-07-19 PROCEDURE — 6370000000 HC RX 637 (ALT 250 FOR IP): Performed by: INTERNAL MEDICINE

## 2022-07-19 PROCEDURE — 6360000002 HC RX W HCPCS: Performed by: INTERNAL MEDICINE

## 2022-07-19 PROCEDURE — 2060000000 HC ICU INTERMEDIATE R&B

## 2022-07-19 PROCEDURE — 84484 ASSAY OF TROPONIN QUANT: CPT

## 2022-07-19 PROCEDURE — 36415 COLL VENOUS BLD VENIPUNCTURE: CPT

## 2022-07-19 PROCEDURE — 2580000003 HC RX 258: Performed by: INTERNAL MEDICINE

## 2022-07-19 RX ORDER — POLYETHYLENE GLYCOL 3350 17 G/17G
17 POWDER, FOR SOLUTION ORAL DAILY PRN
Status: DISCONTINUED | OUTPATIENT
Start: 2022-07-19 | End: 2022-07-19 | Stop reason: SDUPTHER

## 2022-07-19 RX ORDER — SODIUM CHLORIDE 9 MG/ML
INJECTION, SOLUTION INTRAVENOUS PRN
Status: DISCONTINUED | OUTPATIENT
Start: 2022-07-19 | End: 2022-07-22 | Stop reason: HOSPADM

## 2022-07-19 RX ORDER — POLYETHYLENE GLYCOL 3350 17 G/17G
17 POWDER, FOR SOLUTION ORAL DAILY PRN
Status: DISCONTINUED | OUTPATIENT
Start: 2022-07-19 | End: 2022-07-22 | Stop reason: HOSPADM

## 2022-07-19 RX ORDER — LEVETIRACETAM 500 MG/1
500 TABLET ORAL 2 TIMES DAILY
Status: DISCONTINUED | OUTPATIENT
Start: 2022-07-19 | End: 2022-07-22 | Stop reason: HOSPADM

## 2022-07-19 RX ORDER — ACETAMINOPHEN 650 MG/1
650 SUPPOSITORY RECTAL EVERY 6 HOURS PRN
Status: DISCONTINUED | OUTPATIENT
Start: 2022-07-19 | End: 2022-07-22 | Stop reason: HOSPADM

## 2022-07-19 RX ORDER — SODIUM CHLORIDE 0.9 % (FLUSH) 0.9 %
5-40 SYRINGE (ML) INJECTION PRN
Status: DISCONTINUED | OUTPATIENT
Start: 2022-07-19 | End: 2022-07-22 | Stop reason: HOSPADM

## 2022-07-19 RX ORDER — ONDANSETRON 4 MG/1
4 TABLET, ORALLY DISINTEGRATING ORAL EVERY 8 HOURS PRN
Status: DISCONTINUED | OUTPATIENT
Start: 2022-07-19 | End: 2022-07-22 | Stop reason: HOSPADM

## 2022-07-19 RX ORDER — MEMANTINE HYDROCHLORIDE 10 MG/1
10 TABLET ORAL 2 TIMES DAILY
Status: DISCONTINUED | OUTPATIENT
Start: 2022-07-19 | End: 2022-07-19 | Stop reason: SDUPTHER

## 2022-07-19 RX ORDER — SODIUM CHLORIDE 9 MG/ML
INJECTION, SOLUTION INTRAVENOUS CONTINUOUS
Status: DISCONTINUED | OUTPATIENT
Start: 2022-07-19 | End: 2022-07-21

## 2022-07-19 RX ORDER — DEXAMETHASONE 4 MG/1
4 TABLET ORAL 2 TIMES DAILY WITH MEALS
Status: DISCONTINUED | OUTPATIENT
Start: 2022-07-19 | End: 2022-07-20

## 2022-07-19 RX ORDER — ONDANSETRON 2 MG/ML
4 INJECTION INTRAMUSCULAR; INTRAVENOUS EVERY 6 HOURS PRN
Status: DISCONTINUED | OUTPATIENT
Start: 2022-07-19 | End: 2022-07-22 | Stop reason: HOSPADM

## 2022-07-19 RX ORDER — MEMANTINE HYDROCHLORIDE 5 MG/1
15 TABLET ORAL 2 TIMES DAILY
Status: DISCONTINUED | OUTPATIENT
Start: 2022-07-19 | End: 2022-07-22 | Stop reason: HOSPADM

## 2022-07-19 RX ORDER — ACETAMINOPHEN 325 MG/1
650 TABLET ORAL EVERY 6 HOURS PRN
Status: DISCONTINUED | OUTPATIENT
Start: 2022-07-19 | End: 2022-07-22 | Stop reason: HOSPADM

## 2022-07-19 RX ORDER — HYDROCODONE BITARTRATE AND ACETAMINOPHEN 5; 325 MG/1; MG/1
1 TABLET ORAL EVERY 6 HOURS PRN
Status: DISCONTINUED | OUTPATIENT
Start: 2022-07-19 | End: 2022-07-22 | Stop reason: HOSPADM

## 2022-07-19 RX ORDER — SODIUM CHLORIDE 0.9 % (FLUSH) 0.9 %
5-40 SYRINGE (ML) INJECTION EVERY 12 HOURS SCHEDULED
Status: DISCONTINUED | OUTPATIENT
Start: 2022-07-19 | End: 2022-07-22 | Stop reason: HOSPADM

## 2022-07-19 RX ADMIN — Medication 10 ML: at 21:13

## 2022-07-19 RX ADMIN — Medication 10 ML: at 12:04

## 2022-07-19 RX ADMIN — APIXABAN 5 MG: 5 TABLET, FILM COATED ORAL at 21:13

## 2022-07-19 RX ADMIN — SODIUM CHLORIDE 100 ML/HR: 9 INJECTION, SOLUTION INTRAVENOUS at 11:53

## 2022-07-19 RX ADMIN — APIXABAN 5 MG: 5 TABLET, FILM COATED ORAL at 11:34

## 2022-07-19 RX ADMIN — SODIUM CHLORIDE: 9 INJECTION, SOLUTION INTRAVENOUS at 21:15

## 2022-07-19 RX ADMIN — MEMANTINE HYDROCHLORIDE 15 MG: 5 TABLET, FILM COATED ORAL at 14:46

## 2022-07-19 RX ADMIN — LEVETIRACETAM 500 MG: 500 TABLET, FILM COATED ORAL at 21:13

## 2022-07-19 RX ADMIN — LEVETIRACETAM 500 MG: 500 TABLET, FILM COATED ORAL at 11:34

## 2022-07-19 RX ADMIN — DEXAMETHASONE 4 MG: 4 TABLET ORAL at 17:22

## 2022-07-19 RX ADMIN — DEXAMETHASONE 4 MG: 4 TABLET ORAL at 11:34

## 2022-07-19 RX ADMIN — MEMANTINE HYDROCHLORIDE 15 MG: 5 TABLET, FILM COATED ORAL at 23:15

## 2022-07-19 ASSESSMENT — PAIN SCALES - GENERAL
PAINLEVEL_OUTOF10: 0
PAINLEVEL_OUTOF10: 0

## 2022-07-19 ASSESSMENT — SOCIAL DETERMINANTS OF HEALTH (SDOH)
WITHIN THE LAST YEAR, HAVE YOU BEEN KICKED, HIT, SLAPPED, OR OTHERWISE PHYSICALLY HURT BY YOUR PARTNER OR EX-PARTNER?: NO
WITHIN THE LAST YEAR, HAVE YOU BEEN AFRAID OF YOUR PARTNER OR EX-PARTNER?: NO
WITHIN THE LAST YEAR, HAVE YOU BEEN KICKED, HIT, SLAPPED, OR OTHERWISE PHYSICALLY HURT BY YOUR PARTNER OR EX-PARTNER?: NO
WITHIN THE LAST YEAR, HAVE YOU BEEN HUMILIATED OR EMOTIONALLY ABUSED IN OTHER WAYS BY YOUR PARTNER OR EX-PARTNER?: NO
WITHIN THE LAST YEAR, HAVE YOU BEEN AFRAID OF YOUR PARTNER OR EX-PARTNER?: NO
WITHIN THE LAST YEAR, HAVE TO BEEN RAPED OR FORCED TO HAVE ANY KIND OF SEXUAL ACTIVITY BY YOUR PARTNER OR EX-PARTNER?: NO
WITHIN THE LAST YEAR, HAVE YOU BEEN HUMILIATED OR EMOTIONALLY ABUSED IN OTHER WAYS BY YOUR PARTNER OR EX-PARTNER?: NO
WITHIN THE LAST YEAR, HAVE TO BEEN RAPED OR FORCED TO HAVE ANY KIND OF SEXUAL ACTIVITY BY YOUR PARTNER OR EX-PARTNER?: NO

## 2022-07-19 NOTE — CONSULTS
[facility]  Hematology/Oncology  Consult      Patient Name: Romana Show  YOB: 1960  PCP: No primary care provider on file. Referring Provider: [address] / [place] [id number]     Reason for Consultation: No chief complaint on file. History of Present Illness: This is a 62-year-old male patient who was recently hospitalized and had extensive work-up for stroke and a lung mass with metastatic disease. CT chest with large necrotic soft tissue mass in the RLL encompasses in the inferior R hilum. Metastatic mediastinal and R hilar lymphadenopathy. Tiny satellite nodules in the RLL. Multitude of tiny centrilobular nodules and density suspicious for tree-in-bud opacities in the RML. BL lung nodules. Osseous metastatic disease. CT A/P large necrotic soft tissue mass in the RLL of the lungs which encompasses the inferior R hilum. Liver metastases with a splenic infarct. BL adrenal gland lesions. Hypodense areas involving both renal parenchyma L>R. Diverticulosis. Enlarged prostate gland. Mildly enlarged gastrohepatic LN. Osseous metastases. MRI brain showed multiple enhancing intraparenchymal lesions with associated edema and mass-effect in the left frontal lobe without midline shift. Enhancing lesions in the frontal calvarium. Patient was started on dexamethasone and keppra. Radiation oncology was consulted for whole brain radiation with plans for 30 Gray in 10 fractions. Patient then underwent a CT-guided liver biopsy with pathology, molecular markers, and PD-L1 pending. Final systemic therapy to be determined pending pathology. Patient was also found to have a DVT and was started on Eliquis. Patient presented to the ED for current admission for evaluation of worsening altered mental status. CT head showed multiple areas of low-attenuation with a new large area in the R temporoparietal occipital region highly suspicious of infarct.  2 other areas of 2.2 cm and 2.9 cm were as days. Take lowest dose possible to manage pain 7/15/22 7/22/22  Arelis Orozco MD   apixaban (ELIQUIS) 5 MG TABS tablet Take 1 tablet by mouth in the morning and 1 tablet before bedtime. 7/15/22 8/14/22  Arelis Orozco MD   levETIRAcetam (KEPPRA) 500 MG tablet Take 1 tablet by mouth in the morning and 1 tablet before bedtime. 7/15/22   Arelis Orozco MD   dexamethasone (DECADRON) 4 MG tablet Take 1 tablet by mouth in the morning and 1 tablet in the evening. Take with meals. Do all this for 7 days. 7/15/22 7/22/22  Arelis Orozco MD   polyethylene glycol (GLYCOLAX) 17 g packet Take 17 g by mouth daily as needed for Constipation 7/15/22 8/14/22  Arelis Orozco MD   ibuprofen (ADVIL;MOTRIN) 800 MG tablet Take 800 mg by mouth every 6 hours as needed for Pain  7/15/22  Historical Provider, MD       Allergies  No Known Allergies    Review of Systems: Generalized fatigue. Feels he is more oriented than when first came in, intermittently slow to answer. Objective  /68   Pulse 80   Resp 22     Physical Exam:   Performance Status:  General: Alert but intermittently confused  Head and neck : PERRLA, EOMI . Sclera non icteric. Oropharynx : Clear  Neck: no JVD,  no adenopathy  Heart: Regular rate and regular rhythm, no murmur  Lungs: Clear to auscultation   Extremities: No edema,no cyanosis, no clubbing. Abdomen: Soft, non-tender;no masses, no organomegaly  Skin:  No rash. Neurologic:Cranial nerves grossly intact. No focal motor or sensory deficits .     Recent Laboratory Data-   Lab Results   Component Value Date    WBC 18.0 (H) 07/18/2022    HGB 10.5 (L) 07/18/2022    HCT 31.2 (L) 07/18/2022    MCV 90.4 07/18/2022     07/18/2022    LYMPHOPCT 6.9 (L) 07/18/2022    RBC 3.45 (L) 07/18/2022    MCH 30.4 07/18/2022    MCHC 33.7 07/18/2022    RDW 14.4 07/18/2022    NEUTOPHILPCT 87.1 (H) 07/18/2022    MONOPCT 5.2 07/18/2022    BASOPCT 0.0 07/18/2022    NEUTROABS 15.66 (H) 07/18/2022    LYMPHSABS 1.26 (L) 07/18/2022    MONOSABS 0.90 07/18/2022    EOSABS 0.00 (L) 07/18/2022    BASOSABS 0.00 07/18/2022       Lab Results   Component Value Date     07/18/2022    K 3.9 07/18/2022    CL 96 (L) 07/18/2022    CO2 26 07/18/2022    BUN 28 (H) 07/18/2022    CREATININE 0.8 07/18/2022    GLUCOSE 158 07/18/2022    CALCIUM 10.0 07/18/2022    PROT 7.4 07/18/2022    LABALBU 3.5 07/18/2022    BILITOT 0.5 07/18/2022    ALKPHOS 209 (H) 07/18/2022    AST 53 (H) 07/18/2022    ALT 81 (H) 07/18/2022    LABGLOM >60 07/18/2022    GFRAA >60 07/18/2022       No results found for: IRON, TIBC, FERRITIN        Radiology-    Echo Complete    Result Date: 7/13/2022  Transthoracic Echocardiography Report (TTE)  Demographics   Patient Name   Carlos Griffith Gender            Male                 P   Medical Record [id number]      Room Number       0603  Number   Account #      [de-identified]     Procedure Date    07/13/2022   Corporate ID                 Ordering          Rosalino Dong MD                               Physician   Accession      [id number]    Referring  Number                       Physician   Date of Birth  1960    Sonographer       MarianneSathishmegha   Age            62 year(s)    Interpreting      [address] [place] [address]                               Physician         Physician Cardiology                                                 Faviola Mccarthy MD                                Any Other  Procedure Type of Study   TTE procedure:Echo Complete W/Doppler & Color Flow. Procedure Date Date: 07/13/2022 Start: 09:06 AM Study Location: Echo Lab Technical Quality: Adequate visualization Indications:Abnormal ECG and Left ventricle systolic dysfunction. Patient Status: Routine Height: 69 inches Weight: 143 pounds BSA: 1.79 m^2 BMI: 21.12 kg/m^2 HR: 80 bpm BP: 108/69 mmHg  Findings   Left Ventricle  Normal left ventricular chamber size. Normal left ventricular systolic function. Visually estimated LVEF is 55-60 %.   No wall motion abnormalities. Normal diastolic function. Normal left atrial pressure. Right Ventricle  Normal right ventricle structure and function. Left Atrium  Normal left atrial size. Right Atrium  Normal right atrial size   Mitral Valve  Physiologic and/or trace mitral regurgitation is present. No evidence of hemodynamically significant mitral stenosis. Tricuspid Valve  The tricuspid valve appears structurally normal.  Physiologic and/or trace tricuspid regurgitation. Aortic Valve  Trileaflet aortic valve. Normal leaflet mobility. No aortic stenosis. No aortic regurgitation. Pulmonic Valve  Normal pulmonic valve structure and function. Physiologic and/or trace pulmonic regurgitation present. Pericardial Effusion  No evidence for hemodynamically significant pericardial effusion. Pleural Effusion  No evidence of pleural effusion. Aorta  Normal aortic root and ascending aorta. Miscellaneous  The inferior vena cava diameter is normal with normal respiratory  variation. Unable to estimate PA pressure. Conclusions   Summary  Normal left ventricular chamber size. Normal left ventricular systolic function. Visually estimated LVEF is 55-60 %. No wall motion abnormalities. Normal diastolic function. Normal left atrial pressure. Normal right ventricle structure and function. Normal left atrial size. Normal right atrial size  Unable to estimate PA pressure. No comparison study available.    Signature   ----------------------------------------------------------------  Electronically signed by Pierce Vega MD(Interpreting  physician) on 07/13/2022 01:20 PM  ----------------------------------------------------------------  M-Mode/2D Measurements & Calculations   LV Diastolic    LV Systolic Dimension: 4.1   AV Cusp Separation: 1.5 cmLA  Dimension: 5.2  cm                           Dimension: 3.2 cmAO Root  cm              LV Volume Diastolic: 444.2   Dimension: 3 cm  LV FS:21.2 %    ml  LV PW           LV Volume Systolic: 04.9 ml  Diastolic: 0.9  LV EDV/LV EDV Index: 130.8  cm              ml/73 ml/m^2LV ESV/LV ESV    RV Diastolic Dimension: 3.1  LV PW Systolic: Index: 80.0 XN/02YL/ m^2     cm  1.6 cm          EF Calculated: 43.5 %  Septum          LV Mass Index: 108 l/min*m^2 LA/Aorta: 3.74  Diastolic: 1.1  LV Length: 9.6 cm            Ascending Aorta: 3 cm  cm                                           LA volume/Index: 35.5 ml  Septum          LVOT: 2 cm                   /70SG/C^2  Systolic: 1.1  cm  CO: 1.35 l/min                               IVC Expiration: 1.1 cm  CI: 2.82  l/m*m^2  LV Mass: 194.16  g  Doppler Measurements & Calculations   MV Peak E-Wave: 0.64 AV Peak Velocity:     LVOT Peak Velocity: 0.88 m/s  m/s                  1.15 m/s              LVOT Mean Velocity: 0.73 m/s  MV Peak A-Wave: 0.73 AV Peak Gradient:     LVOT Peak Gradient: 3.1  m/s                  5.32 mmHg             mmHgLVOT Mean Gradient: 2.3  MV E/A Ratio: 0.88   AV Mean Velocity:     mmHg  MV Peak Gradient:    0.82 m/s  3.2 mmHg             AV Mean Gradient: 3.2  MV Mean Gradient:    mmHg  1.4 mmHg             AV VTI: 22.5 cm       TR Velocity:2.28 m/s  MV Mean Velocity:    AV Area               TR Gradient:20.85 mmHg  0.55 m/s             (Continuity):2.81     PV Peak Velocity: 1.1 m/s  MV Deceleration      cm^2                  PV Peak Gradient: 4.86 mmHg  Time: 161.4 msec                           PV Mean Velocity: 0.79 m/s  MV P1/2t: 37.6 msec  LVOT VTI: 20.1 cm     PV Mean Gradient: 2.9 mmHg  MVA by PHT:5.85 cm^2  MV Area  (continuity): 2.8  cm^2  MV E' Septal  Velocity: 0.06 m/s  MV E' Lateral  Velocity: 11 m/s  [email or web address] [id number]    XR CHEST STANDARD (2 VW)    Result Date: 7/12/2022  EXAMINATION: THREE XRAY VIEWS OF THE THORACIC SPINE; 2 XRAY VIEWS OF THE CERVICAL SPINE; TWO XRAY VIEWS OF THE CHEST 7/12/2022 7:41 am COMPARISON: None. HISTORY: ORDERING SYSTEM PROVIDED HISTORY: Dorsalgia of thoracic region TECHNOLOGIST PROVIDED HISTORY: Reason for exam:->mid back pain; ORDERING SYSTEM PROVIDED HISTORY: Cervical radiculopathy TECHNOLOGIST PROVIDED HISTORY: Reason for exam:->back pain, neck pain radiculopathy; ORDERING SYSTEM PROVIDED HISTORY: Dorsalgia of thoracic region TECHNOLOGIST PROVIDED HISTORY: Reason for exam:->mid back pain for 2-3 weeks pain down left arm FINDINGS: Chest: A mass measuring approximately 5 cm in diameter is located posterior to the right pulmonary hilum and is highly suspicious for neoplasm. Contrast-enhanced CT of the chest is recommended for further evaluation. Normal heart and pulmonary vascularity. Neither costophrenic angle is blunted. C-spine: Moderate C6-7 degenerative disc disease. No fracture or dislocation. Normal soft tissues. T-spine: Moderate multilevel degenerative disc disease. No fracture or dislocation. Right lung mass projecting posterior to the right pulmonary hilum which is quite suspicious for underlying neoplasm. Contrast-enhanced CT of the chest is recommended. Degenerative cervical and thoracic spondylosis. XR CERVICAL SPINE (2-3 VIEWS)    Result Date: 7/12/2022  EXAMINATION: THREE XRAY VIEWS OF THE THORACIC SPINE; 2 XRAY VIEWS OF THE CERVICAL SPINE; TWO XRAY VIEWS OF THE CHEST 7/12/2022 7:41 am COMPARISON: None.  HISTORY: ORDERING SYSTEM PROVIDED HISTORY: Dorsalgia of thoracic region TECHNOLOGIST PROVIDED HISTORY: Reason for exam:->mid back pain; ORDERING SYSTEM PROVIDED HISTORY: Cervical radiculopathy TECHNOLOGIST PROVIDED HISTORY: Reason for exam:->back pain, neck pain radiculopathy; ORDERING SYSTEM PROVIDED HISTORY: Dorsalgia of thoracic region TECHNOLOGIST PROVIDED HISTORY: Reason for exam:->mid back pain for 2-3 weeks pain down left arm FINDINGS: Chest: A mass measuring approximately 5 cm in diameter is located posterior to the right pulmonary hilum and is highly suspicious for neoplasm. Contrast-enhanced CT of the chest is recommended for further evaluation. Normal heart and pulmonary vascularity. Neither costophrenic angle is blunted. C-spine: Moderate C6-7 degenerative disc disease. No fracture or dislocation. Normal soft tissues. T-spine: Moderate multilevel degenerative disc disease. No fracture or dislocation. Right lung mass projecting posterior to the right pulmonary hilum which is quite suspicious for underlying neoplasm. Contrast-enhanced CT of the chest is recommended. Degenerative cervical and thoracic spondylosis. XR THORACIC SPINE (3 VIEWS)    Result Date: 7/12/2022  EXAMINATION: THREE XRAY VIEWS OF THE THORACIC SPINE; 2 XRAY VIEWS OF THE CERVICAL SPINE; TWO XRAY VIEWS OF THE CHEST 7/12/2022 7:41 am COMPARISON: None. HISTORY: ORDERING SYSTEM PROVIDED HISTORY: Dorsalgia of thoracic region TECHNOLOGIST PROVIDED HISTORY: Reason for exam:->mid back pain; ORDERING SYSTEM PROVIDED HISTORY: Cervical radiculopathy TECHNOLOGIST PROVIDED HISTORY: Reason for exam:->back pain, neck pain radiculopathy; ORDERING SYSTEM PROVIDED HISTORY: Dorsalgia of thoracic region TECHNOLOGIST PROVIDED HISTORY: Reason for exam:->mid back pain for 2-3 weeks pain down left arm FINDINGS: Chest: A mass measuring approximately 5 cm in diameter is located posterior to the right pulmonary hilum and is highly suspicious for neoplasm. Contrast-enhanced CT of the chest is recommended for further evaluation. Normal heart and pulmonary vascularity. Neither costophrenic angle is blunted. C-spine: Moderate C6-7 degenerative disc disease. No fracture or dislocation. Normal soft tissues. T-spine: Moderate multilevel degenerative disc disease. No fracture or dislocation. Right lung mass projecting posterior to the right pulmonary hilum which is quite suspicious for underlying neoplasm. Contrast-enhanced CT of the chest is recommended.  Degenerative cervical and thoracic spondylosis. CT HEAD WO CONTRAST    Result Date: 7/18/2022  EXAMINATION: CT OF THE HEAD WITHOUT CONTRAST  7/18/2022 12:04 pm TECHNIQUE: CT of the head was performed without the administration of intravenous contrast. Automated exposure control, iterative reconstruction, and/or weight based adjustment of the mA/kV was utilized to reduce the radiation dose to as low as reasonably achievable. COMPARISON: July 13, 1022 brain MRI. HISTORY: ORDERING SYSTEM PROVIDED HISTORY: altered mental status TECHNOLOGIST PROVIDED HISTORY: Has a \"code stroke\" or \"stroke alert\" been called? ->No Reason for exam:->altered mental status FINDINGS: BRAIN/VENTRICLES: Multiple enhancing lesions noted on previous contrast enhanced brain MRI from July 13, 2020 are not as well visualized on this noncontrast study. There is a new large area of low attenuation in the right temporoparietal occipital region. Additional foci of low attenuation in the left occipital lobe and right frontal lobe. No abnormal extra-axial fluid collection. There is no evidence of hydrocephalus. ORBITS: The visualized portion of the orbits demonstrate no acute abnormality. SINUSES: The visualized paranasal sinuses and mastoid air cells demonstrate no acute abnormality. SOFT TISSUES/SKULL:  No acute abnormality of the visualized skull or soft tissues. There are lytic lesions in the frontal calvarium, compatible with previously assessed metastatic deposits. Multifocal areas of low attenuation as follows: A new large area in the right temporoparietal occipital region (highly suspicious for acute infarct), 2.2 cm focus in the left occipital lobe (corresponds to previously noted area of focal encephalomalacia), and 2.9 cm focus (corresponding to previously noted enhancing mass). There is no significant midline shift. Multiple lytic lesions in the frontal calvarium, compatible with previous assessment of skeletal metastases.  Further evaluation with contrast anterior trachea, probably representing mucous. Lungs/pleura: There is a large, necrotic soft tissue mass identified in the right lower lobe, which also encompasses the inferior right hilum. It measures 9.2 x 5.6 cm in size. It is spiculated in appearance. This is indicative of malignancy. Subcentimeter satellite nodules are noted in the right lower lobe, posterior to the previously described mass. A multitude of tiny centrilobular nodules and densities suspicious for tree-in-bud opacities are identified in the right middle lobe. Similar, but much less significant findings are noted in the superior segment of the right lower lobe. This could represent infective bronchiolitis or other inflammatory disorder, however, the presence of tumoral spread cannot be excluded. A 0.7 cm ill-defined nodule is seen in the right upper lobe (image 42 and series 302). A smaller nodule is noted in the left upper lobe (image 56 on series 302). Punctate nodules are seen in the left lower lobe (images 97 and 120 on series 302). It cannot be determined if all these nodules are neoplastic or inflammatory. A 0.2 cm perifissural lymph node is noted along the left major fissure. No other pulmonary parenchymal nodule or mass is seen. There is no evidence of acute consolidation or infiltrate. No active pleural disease is identified. Centrilobular emphysema is noted. Upper Abdomen: Findings discussed in full detail on the report from the patient's CT scan of the abdomen and pelvis performed the same day. Please refer to that transcription. Soft Tissues/Bones: Expansile, lytic lesions are noted involving the right 2nd and 9th ribs, as well as the left 3rd rib. The largest is noted involving the right 2nd rib and measures 3.1 x 2.9 cm. This is consistent with osseous metastatic disease. Other, lytic lesions are noted involving multiple cervical, thoracic, and lumbar spine vertebral bodies.      1.  Large, necrotic soft tissue mass in the right lower lobe, which also encompasses the inferior right hilum, consistent with malignancy. 2.  Metastatic mediastinal and right hilar lymphadenopathy. 3.  Tiny satellite nodules in the right lower lobe, posterior to the previously described mass. 4.  Multitude of tiny centrilobular nodules and density suspicious for tree-in-bud opacities in the right middle lobe. Similar, but much less significant findings are seen in the superior segment of the right lower lobe. Question infective bronchiolitis or other inflammatory disorder. Tumoral spread cannot be excluded. 5.  Bilateral lung nodules, as described above. It cannot be determined if these nodules are neoplastic or inflammatory. 6.  Centrilobular emphysema. 7.  Abdominal findings discussed in full detail on the report from the patient's CT scan of the abdomen and pelvis performed the same day. Please refer to that transcription. 8.  Osseous metastatic disease. CT GUIDED NEEDLE PLACEMENT    Result Date: 7/14/2022  PROCEDURE: CT NEEDLE BIOPSY LIVER PERCUTANEOUS; CT GUIDED NDL PLACEMENT 7/14/2022 HISTORY: ORDERING SYSTEM PROVIDED HISTORY: mass TECHNOLOGIST PROVIDED HISTORY: Reason for exam:->mass Multiple hepatic lesions. TECHNIQUE: The patient was placed in the supine position and multiple unenhanced axial images of the liver were performed. Automated exposure control, iterative reconstruction, and/or weight based adjustment of the mA/kV was utilized to reduce the radiation dose to as low as reasonably achievable. CONTRAST: None SEDATION: None FLUOROSCOPY DOSE AND TYPE OR TIME AND EXPOSURES: Total CT fluoroscopy time 6.4 seconds. Total exam .79 mGy-cm. Number of images: 951 UMDLILDHDUD OF PROCEDURE: Informed consent was obtained after a detailed explanation of the procedure including risks, benefits, and alternatives. Universal protocol was observed. Sterile gowns, masks, hats and gloves utilized for maximal sterile barrier.   Time-out was called and the patient's order and identity were verified. Under sterile conditions and CT guidance, a 17 gauge guiding needle was placed into the anterior aspect of a lesion in the medial segment of the left hepatic lobe. 4 core biopsy specimens were obtained with an 18 gauge biopsy gun. The patient tolerated the procedure well. FINDINGS: Initial images demonstrate multiple low-density lesions in the liver. Intra procedural images demonstrate good needle position at the anterior aspect of the lesion in the medial segment of the left hepatic lobe. Post biopsy images show no significant hemorrhage about the biopsy site. Status post CT-guided biopsy of lesion in the left lobe of the liver. CT ABDOMEN PELVIS W IV CONTRAST Additional Contrast? None    Result Date: 7/12/2022  EXAMINATION: CT OF THE ABDOMEN AND PELVIS WITH CONTRAST 7/12/2022 2:17 pm TECHNIQUE: CT of the abdomen and pelvis was performed with the administration of intravenous contrast. Multiplanar reformatted images are provided for review. Automated exposure control, iterative reconstruction, and/or weight based adjustment of the mA/kV was utilized to reduce the radiation dose to as low as reasonably achievable. COMPARISON: None. HISTORY: ORDERING SYSTEM PROVIDED HISTORY: mass on CXR, back pain TECHNOLOGIST PROVIDED HISTORY: Additional Contrast?->None Reason for exam:->mass on CXR, back pain Decision Support Exception - unselect if not a suspected or confirmed emergency medical condition->Emergency Medical Condition (MA) FINDINGS: Lower Chest: There is a large, necrotic soft tissue mass seen in the right lower lobe, which also encompasses the inferior right hilum. It measures 9.2 x 5.6 cm in size. It is spiculated in appearance. The finding is indicative of malignancy. Further discussion can be found on the report from the patient's CT scan of the chest performed the same time.   There is eventration of the posterior left hemidiaphragm Organs: Multiple, solid, small, hypodense lesions are identified throughout the liver. The largest measures 2.4 cm in diameter. This is consistent with metastatic disease. Along the superior aspects of the spleen, there is a poorly defined area of hypodensity, measuring 4.4 by 2.6 by 2.7 cm in size. The hypodensity extends to the capsule. This most likely represents a splenic infarct. The gallbladder, biliary tree, and pancreas are unremarkable. A 1.3 x 0.9 cm hypodensity is seen within the right adrenal gland. The Hounsfield units range from 9-22. This could represent an adenoma, however, the presence of metastatic disease cannot be excluded. 2 solid-appearing, small left adrenal gland lesions are identified, with the largest measuring 1.5 x 1.2 cm. Same differential diagnosis exists. MRI of the adrenal glands can be considered for further evaluation. Bilateral renal cortical cysts are noted, of which the largest is noted in the lower pole of the right kidney, measuring 2.1 x 1.9 cm in size. Multiple areas of ill-defined decreased attenuation are noted within both renal parenchyma, left greater than right. The 2 largest areas are also noted within the left kidney. The 1 in the upper pole measures 3.1 x 3.3 cm. The one in the midpole measures 3.4 x 3.3 cm. There is no evidence of a cortical rim sign. Given the presence of the splenic infarct, these could represent bilateral renal infarcts without a cortical rim sign, however, the presence of pyelonephritis cannot be excluded. Metastatic disease is also included in the differential diagnosis. Further evaluation needs to be based on clinical grounds. GI/Bowel: The stomach is suboptimally distended, but grossly unremarkable. The small bowel is unremarkable for a non oral contrast study. Multiple diverticula are noted involving the sigmoid and descending colon. A moderate amount of retained stool is noted throughout the colon.   The appendix is not definitely visualized. Pelvis: The urinary bladder is distended and unremarkable. The seminal vesicles are symmetric in size. The prostate gland is enlarged. Peritoneum/Retroperitoneum: A mildly enlarged gastrohepatic lymph node is noted, measuring 1.7 x 1.1 cm in size. No retroperitoneal or pelvic lymphadenopathy is identified. No free fluid is seen in the abdomen and pelvis. No abdominal or pelvic soft tissue mass is noted. The abdominal aorta is atherosclerotic. Bones/Soft Tissues: A small, expansile, lytic lesion is noted involving the posterolateral right 9th rib. Multiple lytic lesions are identified throughout the visualized thoracolumbar spine, as well as sacrum and iliac bones expansile lesions with cortical breakthrough are identified involving the right iliac bone, as well as left sacral ala. These is consistent with metastatic disease. 1.  Large, necrotic soft tissue mass in the right lower lobe of the lungs, which in [place] is the inferior right hilum. This is described in full detail on the report from the patient's CT scan of the chest performed the same day. Please refer to that transcription. 2.  Liver metastases. 3.  A splenic infarct. 4.  Bilateral adrenal gland lesions, which may represent adenomata. Metastatic disease cannot be excluded. MRI of the adrenal glands can be considered for further evaluation. 5.  Bilateral renal cortical cysts. 6.  Hypodense areas involving both renal parenchyma, left greater than right. These could represent bilateral infarcts without cortical rim sign, however, pyelonephritis cannot be excluded. Metastatic disease is also in the differential diagnosis. Clinical correlation is recommended. 7.  Colonic diverticulosis. 8.  Enlarged prostate gland. 9.  Mildly enlarged gastrohepatic lymph node. 10.  Osseous metastases, as described above.      XR CHEST PORTABLE    Result Date: 7/18/2022  EXAMINATION: ONE XRAY VIEW OF THE CHEST 7/18/2022 12:05 pm COMPARISON: Previous CT scan of the chest dated 07/12/2022 HISTORY: ORDERING SYSTEM PROVIDED HISTORY: fatigue TECHNOLOGIST PROVIDED HISTORY: Reason for exam:->fatigue FINDINGS: The cardiac silhouette is within normals. There is a mass seen within the right hilum. The finding is unchanged compared to prior CT scan. The left lung is clear. There are no findings of pneumonia. 1. No interval change in the large right perihilar mass 2. There are no findings of pneumonia within the right upper lobe or left lung. CT NEEDLE BIOPSY LIVER PERCUTANEOUS    Result Date: 7/14/2022  PROCEDURE: CT NEEDLE BIOPSY LIVER PERCUTANEOUS; CT GUIDED NDL PLACEMENT 7/14/2022 HISTORY: ORDERING SYSTEM PROVIDED HISTORY: mass TECHNOLOGIST PROVIDED HISTORY: Reason for exam:->mass Multiple hepatic lesions. TECHNIQUE: The patient was placed in the supine position and multiple unenhanced axial images of the liver were performed. Automated exposure control, iterative reconstruction, and/or weight based adjustment of the mA/kV was utilized to reduce the radiation dose to as low as reasonably achievable. CONTRAST: None SEDATION: None FLUOROSCOPY DOSE AND TYPE OR TIME AND EXPOSURES: Total CT fluoroscopy time 6.4 seconds. Total exam .79 mGy-cm. Number of images: 660 AACETAAOIMU OF PROCEDURE: Informed consent was obtained after a detailed explanation of the procedure including risks, benefits, and alternatives. Universal protocol was observed. Sterile gowns, masks, hats and gloves utilized for maximal sterile barrier. Time-out was called and the patient's order and identity were verified. Under sterile conditions and CT guidance, a 17 gauge guiding needle was placed into the anterior aspect of a lesion in the medial segment of the left hepatic lobe. 4 core biopsy specimens were obtained with an 18 gauge biopsy gun. The patient tolerated the procedure well. FINDINGS: Initial images demonstrate multiple low-density lesions in the liver. Intra procedural images demonstrate good needle position at the anterior aspect of the lesion in the medial segment of the left hepatic lobe. Post biopsy images show no significant hemorrhage about the biopsy site. Status post CT-guided biopsy of lesion in the left lobe of the liver. US DUP LOWER EXTREMITY LEFT GENA    Result Date: 7/13/2022  EXAMINATION: DUPLEX VENOUS ULTRASOUND OF THE LEFT LOWER EXTREMITY 7/13/2022 7:33 pm TECHNIQUE: Duplex ultrasound using B-mode/gray scaled imaging and Doppler spectral analysis and color flow was obtained of the deep venous structures of the left lower extremity. COMPARISON: None. HISTORY: ORDERING SYSTEM PROVIDED HISTORY: assess dvt TECHNOLOGIST PROVIDED HISTORY: Reason for exam:->assess dvt What reading provider will be dictating this exam?->CRC FINDINGS: There is occlusive thrombus demonstrated at level of mid left calf appearing within left soleal vein. There is also non compressibility at this level suggesting deep vein thrombosis. Remaining veins of left lower extremity show normal compressibility and Doppler blood flow. Findings are suggestive of occlusive deep vein thrombosis including left soleal vein. Remaining veins of left lower extremity show no evidence of deep vein thrombosis. MRI BRAIN W WO CONTRAST    Result Date: 7/14/2022  EXAMINATION: MRI OF THE BRAIN WITHOUT AND WITH CONTRAST  7/13/2022 9:51 pm TECHNIQUE: Multiplanar multisequence MRI of the head/brain was performed without and with the administration of intravenous contrast. COMPARISON: None. HISTORY: ORDERING SYSTEM PROVIDED HISTORY: Stage IV lung cancer, complete staging TECHNOLOGIST PROVIDED HISTORY: Reason for exam:->Stage IV lung cancer, compelte staging FINDINGS: INTRACRANIAL STRUCTURES/VENTRICLES:  There are few small foci of diffusion restriction involving the bilateral frontal, parietal, and occipital lobes. No mass effect or midline shift.  No evidence of an acute intracranial hemorrhage. The ventricles and sulci are normal in size and configuration. The sellar/suprasellar regions appear unremarkable. The normal signal voids within the major intracranial vessels appear maintained. Periventricular and subcortical white matter T2/FLAIR hyperintensities, consistent microangiopathic change. There are multiple enhancing intraparenchymal lesions. The largest of these measures 2 x 1.6 cm in the left frontal lobe. There is some associated vasogenic edema, mass effect, and sulcal effacement. No midline shift. Lesions also involve the more superior left frontal cortex, right frontal lobe, medial left occipital lobe, and right cerebellar hemisphere. ORBITS: The visualized portion of the orbits demonstrate no acute abnormality. SINUSES: Scattered mucosal thickening in the paranasal sinuses. The mastoid air cells are clear. BONES/SOFT TISSUES: There are enhancing lesions in the midline and left frontal calvarium. 1. There are a few small scattered foci of diffusion restriction in the bilateral supratentorial brain, concerning for small areas of infarction. This is likely embolic given the distribution. 2. Multiple enhancing intraparenchymal lesions, consistent with intracranial metastatic disease. There is associated edema and mass effect in the left frontal lobe without midline shift. 3. Enhancing lesions in the frontal calvarium, concerning for osseous metastatic disease. ASSESSMENT/PLAN :  59-year-old male   - RLL lung mass with metastatic disease. CT chest, abdomen, pelvis and MRI brain results as above. He was started on dex and keppra. Rad onc for whole brain radiation with plans for 30 Gray in 10 fractions. S/p CT-guided liver biopsy with pathology, molecular markers, and PD-L1 pending. Final systemic therapy to be determined pending pathology. DVT on Eliquis.      - CT head showed multiple areas of low-attenuation with a new large area in the R temporoparietal occipital region highly suspicious of infarct. 2 other areas of 2.2 cm and 2.9 cm were as previously noted. No significant midline shift. Multiple lytic lesions in the frontal calvarium also noted. - Transferred to [facility]. Neurology consulted for possible new stroke. - LFT's with transaminitis with slight elevation of ammonia 61.7. CBC with leukocytosis related to steroid use. Hgb 10.5, platelets normal.     Pathology on CT guided liver biopsy from 7/14 pending  We will follow        ARA Layne - CNP  Electronically signed 7/19/2022 at 11:22 AM  Patient seen and examined, note edited to reflect above.   Messi Rios MD

## 2022-07-19 NOTE — H&P
Hospitalist History & Physical      PCP: No primary care provider on file. Date of Admission: 7/19/2022    Date of Service: Pt seen/examined on 7/19/2022 and is  admitted to Inpatient with expected LOS greater than two midnights due to medical therapy. Chief Complaint:  had no chief complaint listed for this encounter. History Of Present Illness:    Mr. Jenny Bonilla, a 62y.o. year old male  who  has no past medical history on file. A 20-year-old male who was recently discharged from an outside hospital after extensive work-up for possible carcinoma and stroke as well as DVT. Patient was discharged home and return to the ER on 7/18 at [facility] because of change in mental status. As per patient's wife. Asking multiple questions that did not make sense and he was also becoming more lethargic. No fever, chills, chest pain, shortness of breath, body changes mentioned by patient's wife. In the [place] [address] ER on 7/18, CT head without contrast showed multiple areas of low-attenuation with a new large area in the right temporoparietal occipital region highly suspicious of infarct. 2 other areas of 2.2 cm and 2.9 cm were as previously noted. No significant midline shift. Multiple lytic lesions in the frontal calvarium also noted. Patient was transferred to our hospital for further evaluation and management. I have seen and examined this patient today in his room. Patient is alert awake oriented x2. He is confused and noncooperative with evaluation. No past medical history on file. Past Surgical History:   Procedure Laterality Date    CT NEEDLE BIOPSY LIVER PERCUTANEOUS  7/14/2022    CT NEEDLE BIOPSY LIVER PERCUTANEOUS 7/14/2022 [facility] CT       Prior to Admission medications    Medication Sig Start Date End Date Taking? Authorizing Provider   memantine (NAMENDA) 5 MG tablet Take 1 tablet by mouth in the morning and 1 tablet before bedtime.  Take 1 tabled daily week 1, Take 1 tablet BID week 2, Take 1 tablet BID week 3 , Then follow with the 10 mg prescription. 7/18/22   Clay Jimenes MD   memantine (NAMENDA) 10 MG tablet Take 1 tablet by mouth in the morning and 1 tablet before bedtime. Starting on week 4. 7/18/22   Clay Jimenes MD   HYDROcodone-acetaminophen (NORCO) 5-325 MG per tablet Take 1 tablet by mouth every 6 hours as needed for Pain for up to 7 days. Intended supply: 7 days. Take lowest dose possible to manage pain 7/15/22 7/22/22  Janes Mendoza MD   apixaban (ELIQUIS) 5 MG TABS tablet Take 1 tablet by mouth in the morning and 1 tablet before bedtime. 7/15/22 8/14/22  Janes Mendoza MD   levETIRAcetam (KEPPRA) 500 MG tablet Take 1 tablet by mouth in the morning and 1 tablet before bedtime. 7/15/22   Janes Mendoza MD   dexamethasone (DECADRON) 4 MG tablet Take 1 tablet by mouth in the morning and 1 tablet in the evening. Take with meals. Do all this for 7 days. 7/15/22 7/22/22  Janes Mendoza MD   polyethylene glycol (GLYCOLAX) 17 g packet Take 17 g by mouth daily as needed for Constipation 7/15/22 8/14/22  Janes Mendoza MD   ibuprofen (ADVIL;MOTRIN) 800 MG tablet Take 800 mg by mouth every 6 hours as needed for Pain  7/15/22  Historical Provider, MD         Allergies:  Patient has no known allergies. Social History:    TOBACCO:   reports that he has been smoking cigarettes. He has been smoking an average of .75 packs per day. He has never used smokeless tobacco.  ETOH:   reports current alcohol use of about 1.0 standard drink per week. Family History:    Reviewed in detail and negative for DM, CAD, Cancer, CVA. Positive as follows\"  No family history on file. REVIEW OF SYSTEMS:   Pertinent positives as noted in the HPI. All other systems reviewed and negative. PHYSICAL EXAM:  /68   Pulse 80   Resp 22   General appearance: No apparent distress, appears stated age  HEENT: Normal cephalic, atraumatic without obvious deformity.  Pupils equal, round, and reactive to light. Extra ocular muscles intact. Conjunctivae/corneas clear. Neck: Supple, with full range of motion. No jugular venous distention. Trachea midline. Respiratory: Clear to auscultation bilateral  Cardiovascular: S1, S2, no murmur  Abdomen: Soft, nontender, nondistended  Musculoskeletal: No clubbing, cyanosis, edema of bilateral lower extremities. Brisk capillary refill. Skin: Normal skin color. No rashes or lesions. Neurologic:  Neurovascularly intact without any focal sensory/motor deficits. Cranial nerves: II-XII intact, grossly non-focal.  Psychiatric: Alert and oriented, cooperative with exam.    Reviewed EKG and CXR personally      CBC:   Recent Labs     07/18/22  1133   WBC 18.0*   RBC 3.45*   HGB 10.5*   HCT 31.2*   MCV 90.4   RDW 14.4        BMP:   Recent Labs     07/18/22  1133      K 3.9   CL 96*   CO2 26   BUN 28*   CREATININE 0.8     LFT:  Recent Labs     07/18/22  1133   PROT 7.4   ALKPHOS 209*   ALT 81*   AST 53*   BILITOT 0.5     CE:  No results for input(s): Sofiya Torres in the last 72 hours. PT/INR:   Recent Labs     07/18/22  1133   INR 1.4   APTT 21.2*     BNP: No results for input(s): BNP in the last 72 hours.   ESR:   Lab Results   Component Value Date    SEDRATE 76 (H) 07/13/2022     CRP:   Lab Results   Component Value Date    CRP 13.2 (H) 07/13/2022     D Dimer:   Lab Results   Component Value Date    DDIMER >5250 07/12/2022      Folate and B12:   Lab Results   Component Value Date    VFUBFPVP41 >2000 (H) 07/13/2022   ,   Lab Results   Component Value Date    FOLATE 11.5 07/13/2022     Lactic Acid: No results found for: LACTA  Thyroid Studies:   Lab Results   Component Value Date    TSH 0.982 07/13/2022       Oupatient labs:  Lab Results   Component Value Date    TSH 0.982 07/13/2022    INR 1.4 07/18/2022    LABA1C 5.4 07/13/2022       Urinalysis:    Lab Results   Component Value Date/Time    NITRU Negative 07/12/2022 07:13 PM    WBCUA 0-1 07/12/2022 07:13 PM    BACTERIA NONE SEEN 07/12/2022 07:13 PM    RBCUA 0-1 07/12/2022 07:13 PM    BLOODU TRACE-INTACT 07/12/2022 07:13 PM    SPECGRAV 1.020 07/12/2022 07:13 PM    GLUCOSEU 100 07/12/2022 07:13 PM       ASSESSMENT & PLAN:    Altered mental status  Unclear etiology  Can be multifactorial including evolving strokes  Consult neurology  Obtain UA with microscopy    Elevated troponins  68 and then 87  Check 1 more time, if elevated, consult cardiology    Lung mass with possible local metastasis  Noted during admission in the previous hospital  Oncology recommendations    Liver mets  IR guided liver biopsy done during previous hospitalization  Surgical report not back yet    Acute stroke  During previous hospitalization MRI brain showed multiple strokes  Possible new stroke noted on the CT head on 7/18  Consulted neurology    Brain lesions  Likely metastatic disease  On Keppra and dexamethasone  Follow neurology recommendations    Acute left lower extremity DVT  Patient started on Eliquis, continue    Leukocytosis  Likely reactive as well as from Decadron    Diet: ADULT DIET; Regular  Code Status: Full Code  Surrogate decision maker confirmed with patient:   Extended Emergency Contact Information  Primary Emergency Contact: gresham Ember Gold [facility] Phone: [phone]  Relation: Spouse    DVT Prophylaxis: []Lovenox []Heparin []PCD [x] [facility]  []Encouraged ambulation  Disposition: [x]Med/Surg [] Intermediate [] ICU/CCU  Admit status: [] Observation [x] Inpatient     +++++++++++++++++++++++++++++++++++++++++++++++++  Dannie Heller MD  [address]  +++++++++++++++++++++++++++++++++++++++++++++++++  NOTE: This report was transcribed using voice recognition software. Every effort was made to ensure accuracy; however, inadvertent computerized transcription errors may be present.

## 2022-07-19 NOTE — ED NOTES
Nurse to Nurse report called to [facility] Neuro intensive unit, spoke with Eryn Villafuerte and provided PAS ETA.      Macario Brennan RN  07/18/22 [id number]

## 2022-07-19 NOTE — ED NOTES
Handoff report given to [facility]. Care of patient relinquished at this time.      Wilmer Harris RN  07/18/22 [id number]

## 2022-07-19 NOTE — ED NOTES
Spoke with [facility] Neuro ICU for update, still waiting for transport     Katelin Renteria [facility]  07/19/22 [id number]

## 2022-07-19 NOTE — ED NOTES
Pt resting with eyes closed, spouse at bedside, ANANTH, still waiting on EMS transport     Jeremy Sick, [place]  07/19/22 1035

## 2022-07-19 NOTE — ED NOTES
Pt still waiting for EMS transport, spouse at bedside, no changes     Jeremy Harding, RN  07/19/22 015

## 2022-07-19 NOTE — CARE COORDINATION
7/19/2022 - Care Coordination - admitted for altered mental status, acute CVA. Hx lung mass with metastatic disease. Recent liver biopsy. Neurology and cardiology consults. IV NS @ 100 cc/hr. PT/OT [place] ordered. Spoke with pt wife in pt room. Pt has no PCP - he has an appointment set up with Dr Shawna Bell [facility]. Pharmacy is [facility] in [place]. Lives with his wife in a 2 story home with 3 steps to enter home. Bed/bath located on 2nd floor but he will be set up in 1st floor room with bathroom on 1st floor when he goes home. Denies hx HHC, FRANCO/ARU and DME. Plan is to discharge home when medically ready. Family will provide transport home. SW/CM will follow.

## 2022-07-20 ENCOUNTER — APPOINTMENT (OUTPATIENT)
Dept: NEUROLOGY | Age: 62
DRG: 065 | End: 2022-07-20
Attending: FAMILY MEDICINE
Payer: COMMERCIAL

## 2022-07-20 PROBLEM — D64.9 ANEMIA: Status: ACTIVE | Noted: 2022-07-20

## 2022-07-20 PROBLEM — C34.90 PRIMARY MALIGNANT NEOPLASM OF LUNG METASTATIC TO OTHER SITE (HCC): Status: ACTIVE | Noted: 2022-07-12

## 2022-07-20 LAB
ALBUMIN SERPL-MCNC: 2.9 G/DL (ref 3.5–5.2)
ALP BLD-CCNC: 204 U/L (ref 40–129)
ALT SERPL-CCNC: 93 U/L (ref 0–40)
ANION GAP SERPL CALCULATED.3IONS-SCNC: 12 MMOL/L (ref 7–16)
AST SERPL-CCNC: 53 U/L (ref 0–39)
BILIRUB SERPL-MCNC: 0.7 MG/DL (ref 0–1.2)
BUN BLDV-MCNC: 28 MG/DL (ref 6–23)
CALCIUM SERPL-MCNC: 9.2 MG/DL (ref 8.6–10.2)
CHLORIDE BLD-SCNC: 98 MMOL/L (ref 98–107)
CO2: 21 MMOL/L (ref 22–29)
CREAT SERPL-MCNC: 0.8 MG/DL (ref 0.7–1.2)
EKG ATRIAL RATE: 52 BPM
EKG P AXIS: 16 DEGREES
EKG P-R INTERVAL: 122 MS
EKG Q-T INTERVAL: 466 MS
EKG QRS DURATION: 108 MS
EKG QTC CALCULATION (BAZETT): 433 MS
EKG R AXIS: -32 DEGREES
EKG T AXIS: 22 DEGREES
EKG VENTRICULAR RATE: 52 BPM
GFR AFRICAN AMERICAN: >60
GFR NON-AFRICAN AMERICAN: >60 ML/MIN/1.73
GLUCOSE BLD-MCNC: 108 MG/DL (ref 74–99)
HCT VFR BLD CALC: 30.8 % (ref 37–54)
HEMOGLOBIN: 10.2 G/DL (ref 12.5–16.5)
MCH RBC QN AUTO: 30.2 PG (ref 26–35)
MCHC RBC AUTO-ENTMCNC: 33.1 % (ref 32–34.5)
MCV RBC AUTO: 91.1 FL (ref 80–99.9)
PDW BLD-RTO: 14.6 FL (ref 11.5–15)
PLATELET # BLD: 306 E9/L (ref 130–450)
PMV BLD AUTO: 10.5 FL (ref 7–12)
POTASSIUM SERPL-SCNC: 4 MMOL/L (ref 3.5–5)
RBC # BLD: 3.38 E12/L (ref 3.8–5.8)
SODIUM BLD-SCNC: 131 MMOL/L (ref 132–146)
TOTAL PROTEIN: 6.9 G/DL (ref 6.4–8.3)
TROPONIN, HIGH SENSITIVITY: 99 NG/L (ref 0–11)
WBC # BLD: 22.9 E9/L (ref 4.5–11.5)

## 2022-07-20 PROCEDURE — 84484 ASSAY OF TROPONIN QUANT: CPT

## 2022-07-20 PROCEDURE — 95819 EEG AWAKE AND ASLEEP: CPT

## 2022-07-20 PROCEDURE — 6360000002 HC RX W HCPCS: Performed by: PSYCHIATRY & NEUROLOGY

## 2022-07-20 PROCEDURE — 36415 COLL VENOUS BLD VENIPUNCTURE: CPT

## 2022-07-20 PROCEDURE — 99233 SBSQ HOSP IP/OBS HIGH 50: CPT | Performed by: INTERNAL MEDICINE

## 2022-07-20 PROCEDURE — 97530 THERAPEUTIC ACTIVITIES: CPT

## 2022-07-20 PROCEDURE — 6370000000 HC RX 637 (ALT 250 FOR IP): Performed by: INTERNAL MEDICINE

## 2022-07-20 PROCEDURE — 1200000000 HC SEMI PRIVATE

## 2022-07-20 PROCEDURE — 2060000000 HC ICU INTERMEDIATE R&B

## 2022-07-20 PROCEDURE — 99222 1ST HOSP IP/OBS MODERATE 55: CPT

## 2022-07-20 PROCEDURE — 80053 COMPREHEN METABOLIC PANEL: CPT

## 2022-07-20 PROCEDURE — 6360000002 HC RX W HCPCS: Performed by: INTERNAL MEDICINE

## 2022-07-20 PROCEDURE — 2580000003 HC RX 258: Performed by: INTERNAL MEDICINE

## 2022-07-20 PROCEDURE — 85027 COMPLETE CBC AUTOMATED: CPT

## 2022-07-20 PROCEDURE — 97165 OT EVAL LOW COMPLEX 30 MIN: CPT

## 2022-07-20 PROCEDURE — 97162 PT EVAL MOD COMPLEX 30 MIN: CPT

## 2022-07-20 RX ORDER — DEXAMETHASONE 4 MG/1
4 TABLET ORAL EVERY 6 HOURS SCHEDULED
Status: DISCONTINUED | OUTPATIENT
Start: 2022-07-20 | End: 2022-07-22 | Stop reason: HOSPADM

## 2022-07-20 RX ADMIN — APIXABAN 5 MG: 5 TABLET, FILM COATED ORAL at 20:04

## 2022-07-20 RX ADMIN — MEMANTINE HYDROCHLORIDE 15 MG: 5 TABLET, FILM COATED ORAL at 08:35

## 2022-07-20 RX ADMIN — LEVETIRACETAM 500 MG: 500 TABLET, FILM COATED ORAL at 20:04

## 2022-07-20 RX ADMIN — MEMANTINE HYDROCHLORIDE 15 MG: 5 TABLET, FILM COATED ORAL at 20:04

## 2022-07-20 RX ADMIN — DEXAMETHASONE 4 MG: 4 TABLET ORAL at 20:04

## 2022-07-20 RX ADMIN — LEVETIRACETAM 500 MG: 500 TABLET, FILM COATED ORAL at 08:35

## 2022-07-20 RX ADMIN — DEXAMETHASONE 4 MG: 4 TABLET ORAL at 08:35

## 2022-07-20 RX ADMIN — APIXABAN 5 MG: 5 TABLET, FILM COATED ORAL at 08:35

## 2022-07-20 RX ADMIN — Medication 10 ML: at 08:35

## 2022-07-20 ASSESSMENT — PAIN SCALES - GENERAL: PAINLEVEL_OUTOF10: 0

## 2022-07-20 NOTE — CONSULTS
NEUROLOGY CONSULT NOTE      Requesting Physician: Vijay Xiong MD    Reason for Consult:  Evaluate for AMS. History of Present Illness:  Jacob Lezama is a 62 y.o. male  with h/o metastatic lung cancer and DVT on Eliquis who was admitted to [facility] on 7/19/2022 with presentation of altered mental status. He was  recently admitted and diagnosed with metastatic lung cancer with diffuse metastasis including intracranial mets now being managed with Decadron and Keppra. Approximately 24 hours prior to presentation he developed altered mental status with confusion. His wife states that he was confused asking asking multiple questions about his breakfast as well as how to eat it. No noted abnormal movements or behaviors otherwise. He was also complaining of increased fatigue. His Hematology/Oncology physician is Dr. Hossein Nelson and he is currently being set up for whole brain radiation and chemotherapy. On ED presentation, his BP was 114/68 with pulse of 80. NIHSS was 0. Labs were remarkable for serum WBC of 18, elevated liver enzymes, and ammonia of 61. INR was 1.4. CT scan of the head was remarkable for: \"A new large area in the right temporoparietal occipital region (highly suspicious for acute infarct), 2.2 cm focus in the left occipital lobe (corresponds to previously noted area of focal encephalomalacia), and 2.9 cm focus (corresponding to previously noted enhancing mass). There is no significant midline shift. \"  Subsequent MRI showed: \"There are a few small scattered foci of diffusion restriction in the bilateral supratentorial brain, concerning for small areas of infarction. Multiple enhancing intraparenchymal lesions, consistent with intracranial metastatic disease. There is associated edema and mass effect in the left frontal lobe without midline shift. Enhancing lesions in the frontal calvarium, concerning for osseous metastatic disease.       Past Medical History:    No past medical history on file. Procedure Laterality Date    CT NEEDLE BIOPSY LIVER PERCUTANEOUS  7/14/2022    CT NEEDLE BIOPSY LIVER PERCUTANEOUS 7/14/2022 [facility] CT       Social History:  Social History     Tobacco Use   Smoking Status Every Day    Packs/day: 0.75    Types: Cigarettes   Smokeless Tobacco Never     Social History     Substance and Sexual Activity   Alcohol Use Yes    Alcohol/week: 1.0 standard drink    Types: 1 Standard drinks or equivalent per week     Social History     Substance and Sexual Activity   Drug Use Never         Family History:   No family history on file. Review of Systems:  Constitutional:  Positive for fatigue. Negative for chills and fever. HENT:  Negative for congestion. Eyes:  Negative for redness. Respiratory:  Negative for shortness of breath. Cardiovascular:  Negative for chest pain. Gastrointestinal:  Negative for abdominal pain, nausea and vomiting. Genitourinary:  Negative for dysuria. Musculoskeletal:  Negative for arthralgias. Skin:  Negative for rash. Neurological:  Negative for light-headedness. Psychiatric/Behavioral:  Positive for confusion. All other systems reviewed and are negative.      Allergies:    No Known Allergies     Current Medications:   apixaban (ELIQUIS) tablet 5 mg, BID  dexamethasone (DECADRON) tablet 4 mg, BID WC  HYDROcodone-acetaminophen (NORCO) 5-325 MG per tablet 1 tablet, Q6H PRN  levETIRAcetam (KEPPRA) tablet 500 mg, BID  memantine (NAMENDA) tablet 15 mg, BID  sodium chloride flush 0.9 % injection 5-40 mL, 2 times per day  sodium chloride flush 0.9 % injection 5-40 mL, PRN  0.9 % sodium chloride infusion, PRN  ondansetron (ZOFRAN-ODT) disintegrating tablet 4 mg, Q8H PRN   Or  ondansetron (ZOFRAN) injection 4 mg, Q6H PRN  polyethylene glycol (GLYCOLAX) packet 17 g, Daily PRN  acetaminophen (TYLENOL) tablet 650 mg, Q6H PRN   Or  acetaminophen (TYLENOL) suppository 650 mg, Q6H PRN  0.9 % sodium chloride infusion, Continuous       Physical 07/18/22  1133 07/18/22  1315 07/20/22  0425     --  131*   K 3.9  --  4.0   CL 96*  --  98   CO2 26  --  21*   BUN 28*  --  28*   CREATININE 0.8  --  0.8   GFRAA >60  --  >60   LABGLOM >60  --  >60   GLUCOSE 158* 158 108*   CALCIUM 10.0  --  9.2     Liver:   Recent Labs     07/20/22  0425   AST 53*   ALT 93*   ALKPHOS 204*   PROT 6.9   LABALBU 2.9*   BILITOT 0.7     INR:   Recent Labs     07/18/22  1133   PROTIME 14.9*   INR 1.4       ToxicologyNo results for input(s): PHENYTOIN, CARBTOT, PHENOBARB, VALPROATE in the last 72 hours. Invalid input(s): LAMOTRIG,  KEPPRA  No results for input(s): AMPMETHURSCR, BARBTQTU, BDZQTU, CANNABQUANT, COCMETQTU, OPIAU, PCPQUANT in the last 72 hours. Radiology:  Echo Complete    Result Date: 7/13/2022  Transthoracic Echocardiography Report (TTE)  Demographics   Patient Name   Rao Laws Gender            Male                 [facility] Record [id number]      Room Number       0603  Number   Account #      [de-identified]     Procedure Date    07/13/2022   Corporate ID                 Ordering          Kelli Obrien MD                               Physician   Accession      [id number]    Referring  Number                       Physician   Date of Birth  1960    Sonographer       MarianneSathishmegha   Age            62 year(s)    Interpreting      [address] [place] [address]                               Physician         Physician Cardiology                                                 Jacqueline Chapin MD                                Any Other  Procedure Type of Study   TTE procedure:Echo Complete W/Doppler & Color Flow. Procedure Date Date: 07/13/2022 Start: 09:06 AM Study Location: Echo Lab Technical Quality: Adequate visualization Indications:Abnormal ECG and Left ventricle systolic dysfunction. Patient Status: Routine Height: 69 inches Weight: 143 pounds BSA: 1.79 m^2 BMI: 21.12 kg/m^2 HR: 80 bpm BP: 108/69 mmHg  Findings   Left Ventricle  Normal left ventricular chamber size. Normal left ventricular systolic function. Visually estimated LVEF is 55-60 %. No wall motion abnormalities. Normal diastolic function. Normal left atrial pressure. Right Ventricle  Normal right ventricle structure and function. Left Atrium  Normal left atrial size. Right Atrium  Normal right atrial size   Mitral Valve  Physiologic and/or trace mitral regurgitation is present. No evidence of hemodynamically significant mitral stenosis. Tricuspid Valve  The tricuspid valve appears structurally normal.  Physiologic and/or trace tricuspid regurgitation. Aortic Valve  Trileaflet aortic valve. Normal leaflet mobility. No aortic stenosis. No aortic regurgitation. Pulmonic Valve  Normal pulmonic valve structure and function. Physiologic and/or trace pulmonic regurgitation present. Pericardial Effusion  No evidence for hemodynamically significant pericardial effusion. Pleural Effusion  No evidence of pleural effusion. Aorta  Normal aortic root and ascending aorta. Miscellaneous  The inferior vena cava diameter is normal with normal respiratory  variation. Unable to estimate PA pressure. Conclusions   Summary  Normal left ventricular chamber size. Normal left ventricular systolic function. Visually estimated LVEF is 55-60 %. No wall motion abnormalities. Normal diastolic function. Normal left atrial pressure. Normal right ventricle structure and function. Normal left atrial size. Normal right atrial size  Unable to estimate PA pressure. No comparison study available.    Signature   ----------------------------------------------------------------  Electronically signed by Pedrito Pollock MD(Interpreting  physician) on 07/13/2022 01:20 PM  ----------------------------------------------------------------  M-Mode/2D Measurements & Calculations   LV Diastolic    LV Systolic Dimension: 4.1   AV Cusp Separation: 1.5 cmLA  Dimension: 5.2  cm                           Dimension: 3.2 cmAO Root  cm LV Volume Diastolic: 378.0   Dimension: 3 cm  LV FS:21.2 %    ml  LV PW           LV Volume Systolic: 85.9 ml  Diastolic: 0.9  LV EDV/LV EDV Index: 130.8  cm              ml/73 ml/m^2LV ESV/LV ESV    RV Diastolic Dimension: 3.1  LV PW Systolic: Index: 71.2 TV/63YE/ m^2     cm  1.6 cm          EF Calculated: 43.5 %  Septum          LV Mass Index: 108 l/min*m^2 LA/Aorta: 1.66  Diastolic: 1.1  LV Length: 9.6 cm            Ascending Aorta: 3 cm  cm                                           LA volume/Index: 35.5 ml  Septum          LVOT: 2 cm                   /22AE/C^1  Systolic: 1.1  cm  CO: 1.03 l/min                               IVC Expiration: 1.1 cm  CI: 2.82  l/m*m^2  LV Mass: 194.16  g  Doppler Measurements & Calculations   MV Peak E-Wave: 0.64 AV Peak Velocity:     LVOT Peak Velocity: 0.88 m/s  m/s                  1.15 m/s              LVOT Mean Velocity: 0.73 m/s  MV Peak A-Wave: 0.73 AV Peak Gradient:     LVOT Peak Gradient: 3.1  m/s                  5.32 mmHg             mmHgLVOT Mean Gradient: 2.3  MV E/A Ratio: 0.88   AV Mean Velocity:     mmHg  MV Peak Gradient:    0.82 m/s  3.2 mmHg             AV Mean Gradient: 3.2  MV Mean Gradient:    mmHg  1.4 mmHg             AV VTI: 22.5 cm       TR Velocity:2.28 m/s  MV Mean Velocity:    AV Area               TR Gradient:20.85 mmHg  0.55 m/s             (Continuity):2.81     PV Peak Velocity: 1.1 m/s  MV Deceleration      cm^2                  PV Peak Gradient: 4.86 mmHg  Time: 161.4 msec                           PV Mean Velocity: 0.79 m/s  MV P1/2t: 37.6 msec  LVOT VTI: 20.1 cm     PV Mean Gradient: 2.9 mmHg  MVA by PHT:5.85 cm^2  MV Area  (continuity): 2.8  cm^2  MV E' Septal  Velocity: 0.06 m/s  MV E' Lateral  Velocity: 11 m/s  [email or web address] DocKey=[id number]    XR CHEST STANDARD (2 VW)    Result Date: 7/12/2022  EXAMINATION: THREE XRAY VIEWS OF THE THORACIC SPINE; 2 XRAY VIEWS OF THE CERVICAL SPINE; TWO XRAY VIEWS OF THE CHEST 7/12/2022 7:41 am COMPARISON: None. HISTORY: ORDERING SYSTEM PROVIDED HISTORY: Dorsalgia of thoracic region TECHNOLOGIST PROVIDED HISTORY: Reason for exam:->mid back pain; ORDERING SYSTEM PROVIDED HISTORY: Cervical radiculopathy TECHNOLOGIST PROVIDED HISTORY: Reason for exam:->back pain, neck pain radiculopathy; ORDERING SYSTEM PROVIDED HISTORY: Dorsalgia of thoracic region TECHNOLOGIST PROVIDED HISTORY: Reason for exam:->mid back pain for 2-3 weeks pain down left arm FINDINGS: Chest: A mass measuring approximately 5 cm in diameter is located posterior to the right pulmonary hilum and is highly suspicious for neoplasm. Contrast-enhanced CT of the chest is recommended for further evaluation. Normal heart and pulmonary vascularity. Neither costophrenic angle is blunted. C-spine: Moderate C6-7 degenerative disc disease. No fracture or dislocation. Normal soft tissues. T-spine: Moderate multilevel degenerative disc disease. No fracture or dislocation. Right lung mass projecting posterior to the right pulmonary hilum which is quite suspicious for underlying neoplasm. Contrast-enhanced CT of the chest is recommended. Degenerative cervical and thoracic spondylosis. XR CERVICAL SPINE (2-3 VIEWS)    Result Date: 7/12/2022  EXAMINATION: THREE XRAY VIEWS OF THE THORACIC SPINE; 2 XRAY VIEWS OF THE CERVICAL SPINE; TWO XRAY VIEWS OF THE CHEST 7/12/2022 7:41 am COMPARISON: None.  HISTORY: ORDERING SYSTEM PROVIDED HISTORY: Dorsalgia of thoracic region TECHNOLOGIST PROVIDED HISTORY: Reason for exam:->mid back pain; ORDERING SYSTEM PROVIDED HISTORY: Cervical radiculopathy TECHNOLOGIST PROVIDED HISTORY: Reason for exam:->back pain, neck pain radiculopathy; ORDERING SYSTEM PROVIDED HISTORY: Dorsalgia of thoracic region TECHNOLOGIST PROVIDED HISTORY: Reason for exam:->mid back pain for 2-3 weeks pain down left arm FINDINGS: Chest: A mass measuring approximately 5 cm in diameter is located posterior to the right pulmonary hilum and is highly suspicious for neoplasm. Contrast-enhanced CT of the chest is recommended for further evaluation. Normal heart and pulmonary vascularity. Neither costophrenic angle is blunted. C-spine: Moderate C6-7 degenerative disc disease. No fracture or dislocation. Normal soft tissues. T-spine: Moderate multilevel degenerative disc disease. No fracture or dislocation. Right lung mass projecting posterior to the right pulmonary hilum which is quite suspicious for underlying neoplasm. Contrast-enhanced CT of the chest is recommended. Degenerative cervical and thoracic spondylosis. XR THORACIC SPINE (3 VIEWS)    Result Date: 7/12/2022  EXAMINATION: THREE XRAY VIEWS OF THE THORACIC SPINE; 2 XRAY VIEWS OF THE CERVICAL SPINE; TWO XRAY VIEWS OF THE CHEST 7/12/2022 7:41 am COMPARISON: None. HISTORY: ORDERING SYSTEM PROVIDED HISTORY: Dorsalgia of thoracic region TECHNOLOGIST PROVIDED HISTORY: Reason for exam:->mid back pain; ORDERING SYSTEM PROVIDED HISTORY: Cervical radiculopathy TECHNOLOGIST PROVIDED HISTORY: Reason for exam:->back pain, neck pain radiculopathy; ORDERING SYSTEM PROVIDED HISTORY: Dorsalgia of thoracic region TECHNOLOGIST PROVIDED HISTORY: Reason for exam:->mid back pain for 2-3 weeks pain down left arm FINDINGS: Chest: A mass measuring approximately 5 cm in diameter is located posterior to the right pulmonary hilum and is highly suspicious for neoplasm. Contrast-enhanced CT of the chest is recommended for further evaluation. Normal heart and pulmonary vascularity. Neither costophrenic angle is blunted. C-spine: Moderate C6-7 degenerative disc disease. No fracture or dislocation. Normal soft tissues. T-spine: Moderate multilevel degenerative disc disease. No fracture or dislocation.      Right lung mass projecting posterior to the right pulmonary hilum which is quite suspicious for underlying neoplasm. Contrast-enhanced CT of the chest is recommended. Degenerative cervical and thoracic spondylosis. CT HEAD WO CONTRAST    Result Date: 7/18/2022  EXAMINATION: CT OF THE HEAD WITHOUT CONTRAST  7/18/2022 12:04 pm TECHNIQUE: CT of the head was performed without the administration of intravenous contrast. Automated exposure control, iterative reconstruction, and/or weight based adjustment of the mA/kV was utilized to reduce the radiation dose to as low as reasonably achievable. COMPARISON: July 13, 1022 brain MRI. HISTORY: ORDERING SYSTEM PROVIDED HISTORY: altered mental status TECHNOLOGIST PROVIDED HISTORY: Has a \"code stroke\" or \"stroke alert\" been called? ->No Reason for exam:->altered mental status FINDINGS: BRAIN/VENTRICLES: Multiple enhancing lesions noted on previous contrast enhanced brain MRI from July 13, 2020 are not as well visualized on this noncontrast study. There is a new large area of low attenuation in the right temporoparietal occipital region. Additional foci of low attenuation in the left occipital lobe and right frontal lobe. No abnormal extra-axial fluid collection. There is no evidence of hydrocephalus. ORBITS: The visualized portion of the orbits demonstrate no acute abnormality. SINUSES: The visualized paranasal sinuses and mastoid air cells demonstrate no acute abnormality. SOFT TISSUES/SKULL:  No acute abnormality of the visualized skull or soft tissues. There are lytic lesions in the frontal calvarium, compatible with previously assessed metastatic deposits. Multifocal areas of low attenuation as follows: A new large area in the right temporoparietal occipital region (highly suspicious for acute infarct), 2.2 cm focus in the left occipital lobe (corresponds to previously noted area of focal encephalomalacia), and 2.9 cm focus (corresponding to previously noted enhancing mass). There is no significant midline shift.  Multiple lytic lesions in the frontal calvarium, compatible with previous assessment of skeletal metastases. Further evaluation with contrast enhanced brain MRI and/or perfusion/diffusion study commended. Critical results were called by Dr. Ashley Chakraobrty to Dr. Thomas Gipson  on 7/18/2022 at 12:29 p.m.     CT CHEST W CONTRAST    Addendum Date: 7/13/2022    ADDENDUM: A request was made by the referring physician to assess whether there is pulmonary embolus are not. The study is limited to fully assess for pulmonary emboli, since there is suboptimal opacification of the pulmonary arteries and their tributaries. Regardless, there is no evidence to suggest pulmonary embolus. Result Date: 7/13/2022  EXAMINATION: CT OF THE CHEST WITH CONTRAST 7/12/2022 2:17 pm TECHNIQUE: CT of the chest was performed with the administration of intravenous contrast. Multiplanar reformatted images are provided for review. Automated exposure control, iterative reconstruction, and/or weight based adjustment of the mA/kV was utilized to reduce the radiation dose to as low as reasonably achievable. COMPARISON: None. HISTORY: ORDERING SYSTEM PROVIDED HISTORY: mass on XR TECHNOLOGIST PROVIDED HISTORY: Reason for exam:->mass on XR Decision Support Exception - unselect if not a suspected or confirmed emergency medical condition->Emergency Medical Condition (MA) FINDINGS: Mediastinum: Multiple, enlarged mediastinal lymph nodes are noted involving the paratracheal and subcarinal spaces. The largest lymph node is identified in the subcarinal space, measuring 1.8 x 1.6 cm in size. This indicates metastatic disease. There is a conglomeration of enlarged right hilar and suprahilar lymph nodes, measuring 3.6 x 2.9 cm in size. This also indicates metastatic disease. No left hilar or axillary lymph nodes are present. The thoracic aorta is minimally atherosclerotic. The remainder of the visualized pulmonary vasculature is unremarkable. The heart is within normal limits in size.   The thyroid gland and esophagus are unremarkable. A small amount of decreased attenuation is identified within the anterior trachea, probably representing mucous. Lungs/pleura: There is a large, necrotic soft tissue mass identified in the right lower lobe, which also encompasses the inferior right hilum. It measures 9.2 x 5.6 cm in size. It is spiculated in appearance. This is indicative of malignancy. Subcentimeter satellite nodules are noted in the right lower lobe, posterior to the previously described mass. A multitude of tiny centrilobular nodules and densities suspicious for tree-in-bud opacities are identified in the right middle lobe. Similar, but much less significant findings are noted in the superior segment of the right lower lobe. This could represent infective bronchiolitis or other inflammatory disorder, however, the presence of tumoral spread cannot be excluded. A 0.7 cm ill-defined nodule is seen in the right upper lobe (image 42 and series 302). A smaller nodule is noted in the left upper lobe (image 56 on series 302). Punctate nodules are seen in the left lower lobe (images 97 and 120 on series 302). It cannot be determined if all these nodules are neoplastic or inflammatory. A 0.2 cm perifissural lymph node is noted along the left major fissure. No other pulmonary parenchymal nodule or mass is seen. There is no evidence of acute consolidation or infiltrate. No active pleural disease is identified. Centrilobular emphysema is noted. Upper Abdomen: Findings discussed in full detail on the report from the patient's CT scan of the abdomen and pelvis performed the same day. Please refer to that transcription. Soft Tissues/Bones: Expansile, lytic lesions are noted involving the right 2nd and 9th ribs, as well as the left 3rd rib. The largest is noted involving the right 2nd rib and measures 3.1 x 2.9 cm. This is consistent with osseous metastatic disease.   Other, lytic lesions are noted involving multiple observed. Sterile gowns, masks, hats and gloves utilized for maximal sterile barrier. Time-out was called and the patient's order and identity were verified. Under sterile conditions and CT guidance, a 17 gauge guiding needle was placed into the anterior aspect of a lesion in the medial segment of the left hepatic lobe. 4 core biopsy specimens were obtained with an 18 gauge biopsy gun. The patient tolerated the procedure well. FINDINGS: Initial images demonstrate multiple low-density lesions in the liver. Intra procedural images demonstrate good needle position at the anterior aspect of the lesion in the medial segment of the left hepatic lobe. Post biopsy images show no significant hemorrhage about the biopsy site. Status post CT-guided biopsy of lesion in the left lobe of the liver. CT ABDOMEN PELVIS W IV CONTRAST Additional Contrast? None    Result Date: 7/12/2022  EXAMINATION: CT OF THE ABDOMEN AND PELVIS WITH CONTRAST 7/12/2022 2:17 pm TECHNIQUE: CT of the abdomen and pelvis was performed with the administration of intravenous contrast. Multiplanar reformatted images are provided for review. Automated exposure control, iterative reconstruction, and/or weight based adjustment of the mA/kV was utilized to reduce the radiation dose to as low as reasonably achievable. COMPARISON: None. HISTORY: ORDERING SYSTEM PROVIDED HISTORY: mass on CXR, back pain TECHNOLOGIST PROVIDED HISTORY: Additional Contrast?->None Reason for exam:->mass on CXR, back pain Decision Support Exception - unselect if not a suspected or confirmed emergency medical condition->Emergency Medical Condition (MA) FINDINGS: Lower Chest: There is a large, necrotic soft tissue mass seen in the right lower lobe, which also encompasses the inferior right hilum. It measures 9.2 x 5.6 cm in size. It is spiculated in appearance. The finding is indicative of malignancy.   Further discussion can be found on the report from the patient's CT scan of the chest performed the same time. There is eventration of the posterior left hemidiaphragm Organs: Multiple, solid, small, hypodense lesions are identified throughout the liver. The largest measures 2.4 cm in diameter. This is consistent with metastatic disease. Along the superior aspects of the spleen, there is a poorly defined area of hypodensity, measuring 4.4 by 2.6 by 2.7 cm in size. The hypodensity extends to the capsule. This most likely represents a splenic infarct. The gallbladder, biliary tree, and pancreas are unremarkable. A 1.3 x 0.9 cm hypodensity is seen within the right adrenal gland. The Hounsfield units range from 9-22. This could represent an adenoma, however, the presence of metastatic disease cannot be excluded. 2 solid-appearing, small left adrenal gland lesions are identified, with the largest measuring 1.5 x 1.2 cm. Same differential diagnosis exists. MRI of the adrenal glands can be considered for further evaluation. Bilateral renal cortical cysts are noted, of which the largest is noted in the lower pole of the right kidney, measuring 2.1 x 1.9 cm in size. Multiple areas of ill-defined decreased attenuation are noted within both renal parenchyma, left greater than right. The 2 largest areas are also noted within the left kidney. The 1 in the upper pole measures 3.1 x 3.3 cm. The one in the midpole measures 3.4 x 3.3 cm. There is no evidence of a cortical rim sign. Given the presence of the splenic infarct, these could represent bilateral renal infarcts without a cortical rim sign, however, the presence of pyelonephritis cannot be excluded. Metastatic disease is also included in the differential diagnosis. Further evaluation needs to be based on clinical grounds. GI/Bowel: The stomach is suboptimally distended, but grossly unremarkable. The small bowel is unremarkable for a non oral contrast study.   Multiple diverticula are noted involving the sigmoid and descending colon.  A moderate amount of retained stool is noted throughout the colon. The appendix is not definitely visualized. Pelvis: The urinary bladder is distended and unremarkable. The seminal vesicles are symmetric in size. The prostate gland is enlarged. Peritoneum/Retroperitoneum: A mildly enlarged gastrohepatic lymph node is noted, measuring 1.7 x 1.1 cm in size. No retroperitoneal or pelvic lymphadenopathy is identified. No free fluid is seen in the abdomen and pelvis. No abdominal or pelvic soft tissue mass is noted. The abdominal aorta is atherosclerotic. Bones/Soft Tissues: A small, expansile, lytic lesion is noted involving the posterolateral right 9th rib. Multiple lytic lesions are identified throughout the visualized thoracolumbar spine, as well as sacrum and iliac bones expansile lesions with cortical breakthrough are identified involving the right iliac bone, as well as left sacral ala. These is consistent with metastatic disease. 1.  Large, necrotic soft tissue mass in the right lower lobe of the lungs, which in [place] is the inferior right hilum. This is described in full detail on the report from the patient's CT scan of the chest performed the same day. Please refer to that transcription. 2.  Liver metastases. 3.  A splenic infarct. 4.  Bilateral adrenal gland lesions, which may represent adenomata. Metastatic disease cannot be excluded. MRI of the adrenal glands can be considered for further evaluation. 5.  Bilateral renal cortical cysts. 6.  Hypodense areas involving both renal parenchyma, left greater than right. These could represent bilateral infarcts without cortical rim sign, however, pyelonephritis cannot be excluded. Metastatic disease is also in the differential diagnosis. Clinical correlation is recommended. 7.  Colonic diverticulosis. 8.  Enlarged prostate gland. 9.  Mildly enlarged gastrohepatic lymph node. 10.  Osseous metastases, as described above.      XR CHEST PORTABLE    Result Date: 7/18/2022  EXAMINATION: ONE XRAY VIEW OF THE CHEST 7/18/2022 12:05 pm COMPARISON: Previous CT scan of the chest dated 07/12/2022 HISTORY: ORDERING SYSTEM PROVIDED HISTORY: fatigue TECHNOLOGIST PROVIDED HISTORY: Reason for exam:->fatigue FINDINGS: The cardiac silhouette is within normals. There is a mass seen within the right hilum. The finding is unchanged compared to prior CT scan. The left lung is clear. There are no findings of pneumonia. 1. No interval change in the large right perihilar mass 2. There are no findings of pneumonia within the right upper lobe or left lung. CT NEEDLE BIOPSY LIVER PERCUTANEOUS    Result Date: 7/14/2022  PROCEDURE: CT NEEDLE BIOPSY LIVER PERCUTANEOUS; CT GUIDED NDL PLACEMENT 7/14/2022 HISTORY: ORDERING SYSTEM PROVIDED HISTORY: mass TECHNOLOGIST PROVIDED HISTORY: Reason for exam:->mass Multiple hepatic lesions. TECHNIQUE: The patient was placed in the supine position and multiple unenhanced axial images of the liver were performed. Automated exposure control, iterative reconstruction, and/or weight based adjustment of the mA/kV was utilized to reduce the radiation dose to as low as reasonably achievable. CONTRAST: None SEDATION: None FLUOROSCOPY DOSE AND TYPE OR TIME AND EXPOSURES: Total CT fluoroscopy time 6.4 seconds. Total exam .79 mGy-cm. Number of images: 459 TLPAVEUMXDM OF PROCEDURE: Informed consent was obtained after a detailed explanation of the procedure including risks, benefits, and alternatives. Universal protocol was observed. Sterile gowns, masks, hats and gloves utilized for maximal sterile barrier. Time-out was called and the patient's order and identity were verified. Under sterile conditions and CT guidance, a 17 gauge guiding needle was placed into the anterior aspect of a lesion in the medial segment of the left hepatic lobe. 4 core biopsy specimens were obtained with an 18 gauge biopsy gun.   The patient tolerated the procedure well. FINDINGS: Initial images demonstrate multiple low-density lesions in the liver. Intra procedural images demonstrate good needle position at the anterior aspect of the lesion in the medial segment of the left hepatic lobe. Post biopsy images show no significant hemorrhage about the biopsy site. Status post CT-guided biopsy of lesion in the left lobe of the liver. US DUP LOWER EXTREMITY LEFT GENA    Result Date: 7/13/2022  EXAMINATION: DUPLEX VENOUS ULTRASOUND OF THE LEFT LOWER EXTREMITY 7/13/2022 7:33 pm TECHNIQUE: Duplex ultrasound using B-mode/gray scaled imaging and Doppler spectral analysis and color flow was obtained of the deep venous structures of the left lower extremity. COMPARISON: None. HISTORY: ORDERING SYSTEM PROVIDED HISTORY: assess dvt TECHNOLOGIST PROVIDED HISTORY: Reason for exam:->assess dvt What reading provider will be dictating this exam?->CRC FINDINGS: There is occlusive thrombus demonstrated at level of mid left calf appearing within left soleal vein. There is also non compressibility at this level suggesting deep vein thrombosis. Remaining veins of left lower extremity show normal compressibility and Doppler blood flow. Findings are suggestive of occlusive deep vein thrombosis including left soleal vein. Remaining veins of left lower extremity show no evidence of deep vein thrombosis. MRI BRAIN W WO CONTRAST    Result Date: 7/14/2022  EXAMINATION: MRI OF THE BRAIN WITHOUT AND WITH CONTRAST  7/13/2022 9:51 pm TECHNIQUE: Multiplanar multisequence MRI of the head/brain was performed without and with the administration of intravenous contrast. COMPARISON: None.  HISTORY: ORDERING SYSTEM PROVIDED HISTORY: Stage IV lung cancer, complete staging TECHNOLOGIST PROVIDED HISTORY: Reason for exam:->Stage IV lung cancer, compelte staging FINDINGS: INTRACRANIAL STRUCTURES/VENTRICLES:  There are few small foci of diffusion restriction involving the bilateral frontal, parietal, and occipital lobes. No mass effect or midline shift. No evidence of an acute intracranial hemorrhage. The ventricles and sulci are normal in size and configuration. The sellar/suprasellar regions appear unremarkable. The normal signal voids within the major intracranial vessels appear maintained. Periventricular and subcortical white matter T2/FLAIR hyperintensities, consistent microangiopathic change. There are multiple enhancing intraparenchymal lesions. The largest of these measures 2 x 1.6 cm in the left frontal lobe. There is some associated vasogenic edema, mass effect, and sulcal effacement. No midline shift. Lesions also involve the more superior left frontal cortex, right frontal lobe, medial left occipital lobe, and right cerebellar hemisphere. ORBITS: The visualized portion of the orbits demonstrate no acute abnormality. SINUSES: Scattered mucosal thickening in the paranasal sinuses. The mastoid air cells are clear. BONES/SOFT TISSUES: There are enhancing lesions in the midline and left frontal calvarium. 1. There are a few small scattered foci of diffusion restriction in the bilateral supratentorial brain, concerning for small areas of infarction. This is likely embolic given the distribution. 2. Multiple enhancing intraparenchymal lesions, consistent with intracranial metastatic disease. There is associated edema and mass effect in the left frontal lobe without midline shift. 3. Enhancing lesions in the frontal calvarium, concerning for osseous metastatic disease. The patient's records from referring provider and available information in the EHR was reviewed. Impression:  Acute Encephalopathy: presumed secondary to progressive metastatic elana lesions. Metastatic Lung Cancer: new brain mets compared to prior study. Principal Problem:    AMS (altered mental status)  Resolved Problems:    * No resolved hospital problems.  *      Recommendations: Increase Decadron to 4 mg q6  EEG for evaluation of brain activity and r/o seizures. Continue Keppra as before. Radiation Therapy for Brain mets pending. Further on follow-up. It was my pleasure to evaluate Rose Lopez today. Please call with questions.       Electronically signed by Patricia Penaloza MD on 7/20/2022 at 10:35 AM

## 2022-07-20 NOTE — PROGRESS NOTES
Physical Therapy  Physical Therapy Initial Assessment     Name: Reagan Colunga  : 1960  MRN: [id number]      Date of Service: 2022    Evaluating PT:  Hoda Hernandez, PT, DPT [id number]    Room #:  4690/8561-Q  Diagnosis:  AMS (altered mental status) [R41.82]  PMHx/PSHx:  lung mass, Primary malignant neoplasm of lung metastatic to other site  Procedure/Surgery:  NA  Precautions:  Falls, bed alarm, chair alarm, flat affect, depressed  Equipment Needs:  TBD    SUBJECTIVE:    Pt lives with wife in a 2 story home with first floor setup with 3 steps to enter. Pt unable to recall railing. Pt ambulated with no AD PTA. OBJECTIVE:   Initial Evaluation  Date: 22 Treatment Short Term/ Long Term   Goals   AM-PAC 6 Clicks 28/30     Was pt agreeable to Eval/treatment? yes     Does pt have pain? no     Bed Mobility  Rolling: NT  Supine to sit: SBA  Sit to supine: NT  Scooting: SBA  Independent    Transfers Sit to stand: SBA  Stand to sit: SBA  Stand pivot: Myriam no device  Independent    Ambulation   60 feet with Myriam no device  >200 feet Independent    Stair negotiation: ascended and descended NT  >3 steps with 1 rail Mod Independent    ROM BUE:  Defer to OT note  BLE:  WFL     Strength BUE:  Defer to OT note  BLE:  4/5 grossly  Increase by 1/3 MMT grade   Balance Sitting EOB:  SBA  Dynamic Standing:  Myriam no device  Sitting EOB:  Independent   Dynamic Standing:  Independent      Pt is A & O x 2 to person and place.   CAM-ICU: NT  RASS: 0  Sensation:  Pt reported no numbness or tingling  Edema:  Unremarkable    Vitals:  Heart Rate at rest 80 bpm Heart Rate post session 90 bpm   SpO2 at rest --% SpO2 post session 93%   Blood Pressure at rest 103/71 mmHg Blood Pressure post session 110/66 mmHg       Functional Status Score-Intensive Care Unit (FSS-ICU)   Rolling -/7   Supine to sit transfer 5/7   Unsupported sitting  5/7   Sit to stand transfers 5/7   Ambulation 2/7   Total         Therapeutic Exercises: NT    Patient education  Pt educated on PT role, safety    Patient response to education:   Pt verbalized understanding Pt demonstrated skill Pt requires further education in this area   x x x     ASSESSMENT:    Conditions Requiring Skilled Therapeutic Intervention:    [x]Decreased strength     [x]Decreased ROM  [x]Decreased functional mobility  [x]Decreased balance   [x]Decreased endurance   []Decreased posture  []Decreased sensation  []Decreased coordination   []Decreased vision  [x]Decreased safety awareness   []Increased pain       Comments:  RN reported pt was medically stable. Pt was in bed upon arrival, agreeable to initial evaluation. Pt exhibited flat affect and was somnolent. Pt communicated with mostly one-word answers and was inattentive. Pt moved slowly but transferred to EOB and stood without physical assistance. Pt required verbal cueing and physical assistance to improve positioning in chair during pivot transfer. Pt ambulated with a R lateral drift and decreased attention to R side, requiring cueing to avoid objects on his R side. No learning observed after cues as pt was unable to correct deviation. Pt had multiple minor LOB that required physical assist to correct. Fatigue limited activity. Pt was left in chair with all needs met and call light in reach. All lines remained intact. RN notified and awaiting chair alarm to be delivered. Treatment:  Patient practiced and was instructed in the following treatment:    Bed mobility training - pt given verbal and tactile cues to facilitate proper sequencing and safety during  supine>sit. Sitting EOB for >5 minutes for upright tolerance, postural awareness and BLE ROM  Transfer training - pt was given verbal and tactile cues to facilitate proper hand placement, technique and safety during sit to stand, stand to sit and stand pivot transfers as well as provided with physical assistance.   Gait training- pt was given verbal and tactile cues to facilitate safety, balance, R sided attention during ambulation as well as provided with physical assistance. Pt's/ family goals   1. No goals stated    Patient and or family understand(s) diagnosis, prognosis, and plan of care. yes    PHYSICAL THERAPY PLAN OF CARE:    PT POC is established based on physician order and patient diagnosis     Referring provider/PT Order:  Beto Davidson MD /07/19/22 1015 PT eval and treat  Diagnosis:  AMS (altered mental status) [R41.82]  Specific instructions for next treatment:  Progress activity    Current Treatment Recommendations:     [x] Strengthening to improve independence with functional mobility   [x] ROM to improve independence with functional mobility   [x] Balance Training to improve static/dynamic balance and to reduce fall risk  [x] Endurance Training to improve activity tolerance during functional mobility   [x] Transfer Training to improve safety and independence with all functional transfers   [x] Gait Training to improve gait mechanics, endurance and asses need for appropriate assistive device  [x] Stair Training in preparation for safe discharge home and/or into the community   [] Positioning to prevent skin breakdown and contractures  [x] Safety and Education Training   [x] Patient/Caregiver Education   [] HEP  [] Other     PT long term treatment goals are located in above grid    Frequency of treatments: 2-5x/week x 1-2 weeks. Time in  1405  Time out  1435    Total Treatment Time  15 minutes     Evaluation Time includes thorough review of current medical information, gathering information on past medical history/social history and prior level of function, completion of standardized testing/informal observation of tasks, assessment of data and education on plan of care and goals.     CPT codes:  [] Low Complexity PT evaluation 20680  [x] Moderate Complexity PT evaluation 44084  [] High Complexity PT evaluation 12451  [] PT Re-evaluation 58989  [] Gait training 67849 -- minutes  [] Manual therapy [id number] -- minutes  [x] Therapeutic activities 10142 15 minutes  [] Therapeutic exercises 84920 -- minutes  [] Neuromuscular reeducation 27282 -- minutes     Misti Oconnor, CHRISTIE  Minor Shanks, [address] [facility], DPT  [id number]

## 2022-07-20 NOTE — PROGRESS NOTES
Hospitalist Progress Note      SYNOPSIS: Patient admitted on 2022 for altered mental status. A 21-year-old male who was recently discharged from an outside hospital after extensive work-up for possible carcinoma and stroke as well as DVT. Patient was discharged home and return to the ER on  at [facility] because of change in mental status. As per patient's wife. Asking multiple questions that did not make sense and he was also becoming more lethargic. No fever, chills, chest pain, shortness of breath, body changes mentioned by patient's wife. In the [facility] on , CT head without contrast showed multiple areas of low-attenuation with a new large area in the right temporoparietal occipital region highly suspicious of infarct. 2 other areas of 2.2 cm and 2.9 cm were as previously noted. No significant midline shift. Multiple lytic lesions in the frontal calvarium also noted. Patient was transferred to our hospital for further evaluation and management. SUBJECTIVE:    Patient seen and examined in his room. Alert awake oriented x2. Denies any chest pain, nausea, vomiting. Records reviewed. Stable overnight. No other overnight issues reported. Temp (24hrs), Av °F (36.7 °C), Min:97.6 °F (36.4 °C), Max:98.2 °F (36.8 °C)    DIET: ADULT DIET; Regular  CODE: Full Code    Intake/Output Summary (Last 24 hours) at 2022 0805  Last data filed at 2022 0780  Gross per 24 hour   Intake 2088.08 ml   Output 850 ml   Net 1238.08 ml       OBJECTIVE:    /71   Pulse 74   Temp 98.2 °F (36.8 °C) (Oral)   Resp 22   SpO2 94%     General appearance: No apparent distress, appears stated age and cooperative. HEENT:  Conjunctivae/corneas clear. Neck: Supple. No jugular venous distention.    Respiratory: Clear to auscultation bilaterally, normal respiratory effort  Cardiovascular: Regular rate rhythm, normal S1-S2  Abdomen: Soft, nontender, nondistended  Musculoskeletal: No clubbing, cyanosis, no bilateral lower extremity edema. Brisk capillary refill.    Skin:  No rashes  on visible skin  Neurologic: awake, alert and following commands     ASSESSMENT & PLAN:    Altered mental status  Unclear etiology  Can be multifactorial including evolving strokes  Consult neurology  Obtain UA with microscopy    Elevated troponins  68 and then 87  Check 1 more time, if elevated, consult cardiology    Lung mass with possible local metastasis  Noted during admission in the previous hospital  Oncology recommendations  Final systemic therapy determined based on pathology report    Liver mets  IR guided liver biopsy done during previous hospitalization  Surgical report not back yet  With elevated liver enzymes, likely due to mets    Acute stroke  During previous hospitalization MRI brain showed multiple strokes  Possible new stroke noted on the CT head on 7/18  Consulted neurology    Brain lesions  Likely metastatic disease  On Keppra and dexamethasone  Follow neurology recommendations    Acute left lower extremity DVT  Patient started on Eliquis, continue    Leukocytosis  Likely reactive as well as from Decadron    DISPOSITION:     Medications:  REVIEWED DAILY    Infusion Medications    sodium chloride      sodium chloride 100 mL/hr at 07/20/22 9662     Scheduled Medications    apixaban  5 mg Oral BID    dexamethasone  4 mg Oral BID WC    levETIRAcetam  500 mg Oral BID    memantine  15 mg Oral BID    sodium chloride flush  5-40 mL IntraVENous 2 times per day     PRN Meds: HYDROcodone-acetaminophen, sodium chloride flush, sodium chloride, ondansetron **OR** ondansetron, polyethylene glycol, acetaminophen **OR** acetaminophen    Labs:     Recent Labs     07/18/22  1133 07/20/22  0425   WBC 18.0* 22.9*   HGB 10.5* 10.2*   HCT 31.2* 30.8*    306       Recent Labs     07/18/22  1133 07/20/22  0425    131*   K 3.9 4.0   CL 96* 98   CO2 26 21*   BUN 28* 28*   CREATININE 0.8 0.8   CALCIUM 10.0 9.2       Recent Labs     07/18/22  1133 07/20/22  0425   PROT 7.4 6.9   ALKPHOS 209* 204*   ALT 81* 93*   AST 53* 53*   BILITOT 0.5 0.7       Recent Labs     07/18/22  1133   INR 1.4       No results for input(s): CKTOTAL, TROPONINI in the last 72 hours. Chronic labs:    Lab Results   Component Value Date    TSH 0.982 07/13/2022    INR 1.4 07/18/2022    LABA1C 5.4 07/13/2022       Radiology: REVIEWED DAILY    +++++++++++++++++++++++++++++++++++++++++++++++++  Adriana Pham MD  [facility] Physician - [address] [place] [address]  +++++++++++++++++++++++++++++++++++++++++++++++++  NOTE: This report was transcribed using voice recognition software. Every effort was made to ensure accuracy; however, inadvertent computerized transcription errors may be present.

## 2022-07-20 NOTE — CARE COORDINATION
7/20/2022 - Updated CM note - admitted for altered mental status, acute CVA. Hx lung mass with metastatic diease. Hem/onc, neurology and cardiology consults. Palliative care following. Had liver biopsy on 7/14 and awaiting pathology. WBC 22.9 today. PT/OT evals ordered. Plan is to discharge home when medically stable. Will rely on evals and recommendations to determine appropriate discharge plan. SW/CM will follow.

## 2022-07-20 NOTE — PROGRESS NOTES
[address]       Date:2022                                                               Patient Name: Agnes Ormond  MRN: [id number]  : 1960  Room: [address] [place], [place] [address]    Evaluating OT: Nimesh Faria OTR/L 6113    Referring Provider: Chandler Raza MD   Specific Provider Orders/Date: OT eval and treat (22)       Diagnosis: AMS    CT head without contrast showed multiple areas of low-attenuation with a new large area in the right temporoparietal occipital region highly suspicious of infarct  Reason for admission: Patient admitted on 2022 for altered mental status. Surgery/Procedures: n/a     Pertinent Medical History:  history of other mental status, back pain, elevated troponin, atypical angina, lung mass, lung cancer, metastatic malignant neoplasm, shortness of breath     *Precautions:  Fall Risk, bed/chair alarm, impulsive, seizure, flat affect, Ca    Assessment of current deficits   [x] Functional mobility  [x]ADLs  [x] Strength               [x]Cognition   [x] Functional transfers   [x] IADLs         [x] Safety Awareness   [x]Endurance   [] Fine Coordination        [] ROM     [] Vision/perception   []Sensation    []Gross Motor Coordination [x] Balance   [] Delirium                  []Motor Control     [] Communication    OT PLAN OF CARE   OT POC based on physician orders, patient diagnosis and results of clinical assessment.        Frequency/Duration: 1-3 days/wk for 1-2 weeks PRN    Specific OT Treatment to include:   ADL retraining/adapted techniques and AE recommendations to increase functional independence within precautions                    Energy conservation techniques to improve tolerance for selfcare routine   Functional transfer/mobility training/DME recommendations for increased independence, safety and fall prevention         Patient/family education to increase safety and functional independence within precautions              Environmental modifications for safe mobility and completion of ADLs                           Cognitive retraining ex's to improve problem solving skills & safe participation in ADLs/IADLs  Sensory re-education techniques to improve extremity awareness, maintain skin integrity and improve hand function                             Therapeutic activity to improve functional performance during ADLs/IADLs                                         Therapeutic exercise to improve tolerance and functional strength for ADLs   Balance retraining exercises/tasks for facilitation of postural control with dynamic challenges during ADLs . Neuromuscular re-education: facilitation of righting/equilibrium reactions, normalization muscle tone/facilitation active functional movement                      Delirium prevention/treatment    Modified [place] Scale   Score     Description  0             No symptoms  1             No significant disability despite symptoms  2             Slight disability; able to look after own affairs  3             Moderate disability; able to ambulate without assist/ requires assist with ADLs  4             Moderate/Severe disability;requires assist to ambulate/assist with ADLs  5             Severe disability;bedridden/incontinent   6               Score:   4    Recommended Adaptive Equipment: TBA: bathroom DME for fall prevention      Home Living: Pt lives with wife  in a 2 story home with 3 step(s) to enter and ? rail(s); bed/bath on 2nd floor. Per chart, pt can stay on first floor. Pt is a limited historian; flat. Bathroom setup:walk in shower? Equipment owned: no DME    Prior Level of Function: IND with ADLs;  IND with IADLs. No devcie for ambulation. Driving: yes  Occupation: works    Pain Level: pt c/o 0/10  pain  this session    Cognition: A&O: 3-4/4   Follows 1 step commands with occasional cue. Pt appears depressed; inattentive during session. Pt encouraged to engage in activities; assisted to chair and encouraged to eat. Memory: fair- (decreased recall of instruction- inattention)   Comprehension fair-   Problem solving: poor+   Judgement/safety: poor+               Communication skills: impaired; one word answers- not paying attention to questions           Vision: WFL; denies visual changes               Glasses:yes                                                   Hearing: min [place]olski vs selective      RASS: 0  CAM-ICU: (NT) Delirium    UE Assessment:  Hand Dominance: Right [x]  Left []     ROM Strength   RUE  WFL 4/5   LUE WFL 4/5     Sensation: No c/o numbness/tingling in extremities.    Tone: WNL   Edema: WFL     Functional Assessment:  AM-PAC Daily Activity Raw Score: 14/24   Initial Eval Status  Date: 7/20/22 Treatment Status  Date: STGs = LTGs  Time frame: 7-14 days   Feeding S; set up                        IND  while seated up in chair to increase activity tolerance        Grooming Min A                        Feliciano   while standing sink level requiring no device for balance and demonstrating G tolerance      UB dressing/bathing Mod A  (gown)  *pt not initiating task despite cues                        S; set up       LB dressing/bathing Mod A                        SBA   using AE as needed for safe reach/ energy conservation       Toileting NT                        SBA     Bed Mobility  Supine to sit:   SBA    Sit to supine:   NT                        Feliciano  in prep of ADL tasks & transfers   Functional Transfers Sit to stand:   SBA    Stand to sit:   SBA                        S  sit<>stand/functional bathroom transfers using AD/DME as needed for balance and safety   Functional Mobility SPT: Min A    Min A no device                       S   functional/bathroom mobility using AD as needed & demonstrating G safety     Balance Sitting:     Static:  SBA    Dynamic:Min A  Standing: Min A  S dynamic sitting balance; S dynamic standing balance  during ADL tasks & transfers   Endurance/  Activity Tolerance   F tolerance with light activity. G   tolerance with moderate activity/self care routine   Visual/  Perceptual Grossly WFL; continue to assess during ADL routine                     Vitals:   HR at rest: 68 bpm HR at end of session: 87 bpm   Spo2 at rest:100% Spo2 at end of session -%   BP at rest:128/79 mmHg BP at end of session 148/96 mmHg       Treatment: OT treatment provided this date includes:  Bed mobility: Instruction on precautions prior to bed mobility to facilitate safe transfers and ADLS. Balance retraining: Performed sitting/standing balance ex's with instruction to facilitate righting reactions with postural changes during ADLS. Pt with min drift to R side. Delirium Prevention: Environmental and sensory modifications assessed and implemented to decrease ICU acquired delirium and to improve overall orientation, mentation and pt interaction with family/staff. ;    Line management and environmental modifications made prior to and end of session to ensure patient safety and to increase efficiency of session. Skilled monitoring of HR, O2 saturation, blood pressure and patient's response to activity performed throughout session. Comments: OK from RN to see patient. Upon arrival, patient supine in bed, agreeable to session with min encouragement. Pt flat throughout session. Pt demo fair tolerance with fair- understanding of education/techniques. At end of session, patient left seated in chair to increase activity tolerance. Nurse aware; nurse ordered chair alarm. Call light within reach, all lines and tubes intact. Pt instructed on use of call light for assistance and fall prevention . Patient presents with decreased ROM/strength,activity tolerance, dynamic balance, functional mobility limiting completion of ADLs and safety.   Pt can benefit from continued skilled OT to increase safety, functional independence and quality of life. Rehab Potential: good for established goals    Patient / Family Goal: to return to PLOF    Patient and/or family were instructed/educated on diagnosis, prognosis/goals and plan of care. Pt demonstrated fair- understanding. Evaluation Complexity:low    [] Malnutrition indicators have been identified and nursing has been notified to ensure a dietitian consult is ordered. Time In:1405             Time Out: 1435            Min Units   OT Eval Low 44883 X    OT Eval Medium 83213     OT Eval High [id number]     OT Re-Eval [id number]     Therapeutic Ex [phone]     Therapeutic Activities 72894     ADL/Self Care 72568     Orthotic Management 84014     Neuro Re-Ed 35176     Non-Billable Time        Evaluation time includes thorough review of current medical information, gathering information on past medical history/social history and prior level of function, completion of standardized testing/informal observation of tasks, assessment of data and development of POC/Goals.      Wendy Jin, OTR/L [id number]

## 2022-07-20 NOTE — CONSULTS
Full consult deferred. Patient known to cardiology service (evaluated by Dr Ti Gage on 7/13/22). INPATIENT CARDIOLOGY FOLLOW-UP    Name: Kristen Morton    Age: 62 y.o. Date of Admission: 7/19/2022  9:19 AM    Date of Service: 7/20/2022    Chief Complaint: Follow-up for elevated troponin    Interim History:  No new cardiac complaints. He denies recent CP, worsening SOB, or palpitations. SR on EKG and telemetry.     Review of Systems:   Cardiac: As per HPI  General: No fever, chills  Pulmonary: As per HPI  HEENT: No visual disturbances, difficult swallowing  GI: No nausea, vomiting  : No dysuria, hematuria  Endocrine: No thyroid disease or DM  Musculoskeletal: SILVERIO x 4, no focal motor deficits  Skin: Intact, no rashes  Neuro: No headache, seizures  Psych: Currently with no depression, anxiety    Problem List:  Patient Active Problem List   Diagnosis    Lung mass    Lung cancer, primary, with metastasis from lung to other site, [facility])    Metastatic malignant neoplasm ([facility] [address]    Shortness of breath    Tobacco abuse    Paresthesia    Back pain    Elevated troponin    Atypical angina (HCC)    AMS (altered mental status)       Allergies:  No Known Allergies    Current Medications:  Current Facility-Administered Medications   Medication Dose Route Frequency Provider Last Rate Last Admin    dexamethasone (DECADRON) tablet 4 mg  4 mg Oral 4 times per day Elisabeth Gorman MD        apixaban Nanette Thanh) tablet 5 mg  5 mg Oral BID Kiera Evans MD   5 mg at 07/20/22 0835    HYDROcodone-acetaminophen (NORCO) 5-325 MG per tablet 1 tablet  1 tablet Oral Q6H PRN Kiera Evans MD        levETIRAcetam (KEPPRA) tablet 500 mg  500 mg Oral BID Kiera Evans MD   500 mg at 07/20/22 0835    memantine (NAMENDA) tablet 15 mg  15 mg Oral BID Kiera Evans MD   15 mg at 07/20/22 0835    sodium chloride flush 0.9 % injection 5-40 mL  5-40 mL IntraVENous 2 times per day Kiera Evans MD   10 mL at 07/20/22 0835    sodium chloride flush 0.9 % injection 5-40 mL  5-40 mL IntraVENous PRN Brittney Vargas MD        0.9 % sodium chloride infusion   IntraVENous PRN Brittney Vargas MD        ondansetron (ZOFRAN-ODT) disintegrating tablet 4 mg  4 mg Oral Q8H PRN Brittney Vargas MD        Or    ondansetron (ZOFRAN) injection 4 mg  4 mg IntraVENous Q6H PRN Brittney Vargas MD        polyethylene glycol (GLYCOLAX) packet 17 g  17 g Oral Daily PRN Brittney Vargas MD        acetaminophen (TYLENOL) tablet 650 mg  650 mg Oral Q6H PRN Brittney Vargas MD        Or    acetaminophen (TYLENOL) suppository 650 mg  650 mg Rectal Q6H PRN Brittney Vargas MD        0.9 % sodium chloride infusion   IntraVENous Continuous Brittney Vargas  mL/hr at 07/20/22 0625 Rate Verify at 07/20/22 5206      sodium chloride      sodium chloride 100 mL/hr at 07/20/22 4186       Physical Exam:  /69   Pulse 75   Temp 97.9 °F (36.6 °C) (Oral)   Resp 18   SpO2 94%   Wt Readings from Last 3 Encounters:   07/18/22 140 lb (63.5 kg)   07/15/22 140 lb 3.2 oz (63.6 kg)   07/12/22 142 lb 6.4 oz (64.6 kg)     Appearance: Awake, alert, no acute respiratory distress  Skin: Intact, no rash  Head: Normocephalic, atraumatic  Eyes: EOMI, no conjunctival erythema  ENMT: No pharyngeal erythema, MMM, no rhinorrhea  Neck: Supple, no carotid bruits  Lungs: Clear to auscultation bilaterally. No wheezes, rales, or rhonchi. Cardiac: Regular rate and rhythm, +S1S2, no murmurs apparent  Abdomen: Soft, nontender, +bowel sounds  Extremities: Moves all extremities x 4, no lower extremity edema  Neurologic: No focal motor deficits apparent, normal mood and affect    Intake/Output:    Intake/Output Summary (Last 24 hours) at 7/20/2022 1440  Last data filed at 7/20/2022 0830  Gross per 24 hour   Intake 1928.08 ml   Output 850 ml   Net 1078.08 ml     I/O this shift:   In: [de-identified] [P.O.:80]  Out: -     Laboratory Tests:  Recent Labs     07/18/22  1133 07/18/22  1315 07/20/22  0425     --  131*   K 3.9  --  4.0   CL 96*  --  98   CO2 26  --  21* BUN 28*  --  28*   CREATININE 0.8  --  0.8   GLUCOSE 158* 158 108*   CALCIUM 10.0  --  9.2     Lab Results   Component Value Date/Time    MG 1.9 07/13/2022 10:59 AM     Recent Labs     07/18/22  1133 07/20/22  0425   ALKPHOS 209* 204*   ALT 81* 93*   AST 53* 53*   PROT 7.4 6.9   BILITOT 0.5 0.7   LABALBU 3.5 2.9*     Recent Labs     07/18/22  1133 07/20/22  0425   WBC 18.0* 22.9*   RBC 3.45* 3.38*   HGB 10.5* 10.2*   HCT 31.2* 30.8*   MCV 90.4 91.1   MCH 30.4 30.2   MCHC 33.7 33.1   RDW 14.4 14.6    306   MPV 9.9 10.5     No results found for: CKTOTAL, CKMB, CKMBINDEX, TROPONINI  Recent Labs     07/18/22  1631 07/19/22  1143 07/19/22  1637 07/19/22  2115 07/20/22  0425   TROPHS 87* 110* 120* 97* 99*     Lab Results   Component Value Date    INR 1.4 07/18/2022    INR 1.3 07/12/2022    PROTIME 14.9 (H) 07/18/2022    PROTIME 13.8 (H) 07/12/2022     Lab Results   Component Value Date    TSH 0.982 07/13/2022     Lab Results   Component Value Date    LABA1C 5.4 07/13/2022     No results found for: EAG  No results found for: CHOL  No results found for: TRIG  No results found for: HDL  No results found for: LDLCALC, LDLCHOLESTEROL  No results found for: LABVLDL, VLDL  No results found for: CHOLHDLRATIO  No results for input(s): PROBNP in the last 72 hours. Cardiac Tests:  EKG personally reviewed: SB, rate 52, LAD    Telemetry personally reviewed (date: 7/20/2022): SR, rate 70's    Echocardiogram reviewed: 7/13/22 (Dr. Fariba Rodriguez)   Normal left ventricular chamber size. Normal left ventricular systolic function. Visually estimated LVEF is 55-60 %. No wall motion abnormalities. Normal diastolic function. Normal left atrial pressure. Normal right ventricle structure and function. Normal left atrial size. Normal right atrial size   Unable to estimate PA pressure. No comparison study available.     ASSESSMENT / PLAN:  Elevated troponin -- no chest pain or acute STT changes on EKG  Presentation for altered mental status/acute CVA work-up  Recently diagnosed metastatic lung cancer -- work-up ongoing  Long-standing tobacco abuse  Anemia -- most recent Hg 10.2  LLE DVT (7/13/22) -- on eliquis    - Echocardiogram performed on 7/13/22 (elevated hs-troponin at that time as well) -- normal ventricular function  - Will defer ischemic work-up at this time given the above non-cardiac co-morbidities  - Continue current medications  - Telemetry reviewed (SR)  - Tobacco cessation  - Case discussed with the patient and his wife today  - Will follow IGGY Franklin MD  [facility] Cardiology

## 2022-07-20 NOTE — CONSULTS
Palliative Care Department  [phone]  Palliative Care Initial Consult  Provider Jessica Rice, APRN - CNP      PATIENT: Raegan Colunga  : 1960  MRN: [id number]  ADMISSION DATE: 2022  9:19 AM  Referring Provider: Rafael Richardson MD    Palliative Medicine was consulted on hospital day 1 for assistance with Goals of care, Code Status Discussion, Family support. HPI:     Saji Martins is a 62 y.o. y/o male with a history of other mental status, back pain, elevated troponin, atypical angina, lung mass, lung cancer, metastatic malignant neoplasm, shortness of breath who presented to [facility]) on 2022 from [facility] for further evaluation of abnormal CT of the head, highly suspicious for new stroke needing neurological evaluation. Patient was admitted for altered mental status. Patient had a history of recent discharge from [facility] after extensive work-up for lung mass with metastatic disease, stroke, and DVT. CT-guided biopsy taken on 2022 with results are pending. At the emergency room, CT of the head shows multiple areas of low attenuation with a new large area in the right temporal parietal occipital region highly suspicious for infarct. Multiple lytic lesions in the frontal calvarium. ASSESSMENT/PLAN:     Pertinent Hospital Diagnoses     Altered mental status  Right lower lung mass with metastasis disease      Palliative Care Encounter / Counseling Regarding Goals of Care  Please see detailed goals of care discussion as below  At this time, Raegan Colunga, Does have capacity for medical decision-making.   Capacity is time limited and situation/question specific  During encounter Patient and wife Constanza López jointly participated in medical decision-maker  Outcome of goals of care meeting:  Continue with current medical treatment  Patient wishes to pursue all treatments available  Wishes to remain a full code  Patient's wife is going to bring POA documents for our records  Code status Full Code  Advanced Directives: no POA or living will in epic  Surrogate/Legal NOK:  Heather Higgins (Spouse) [phone]    Spiritual assessment: no spiritual distress identified  Bereavement and grief: to be determined  Referrals to: none today    Thank you for the opportunity to participate in the care of [employer]. ARA Bernardo CNP  Palliative Medicine     SUBJECTIVE:     Details of Conversation:     Chart reviewed. Saw patient at the bedside. Patient was present in the room. Introduced myself and the role of palliative medicine. Patient was alert and oriented and able to make decision for himself. Patient identified his wife as his POA, wife is going to bring POA documents for our records. We discussed goal of care, patient stated that he has a lot on his mind, all of this is new and suddenly, he wishes to go home. However he emphasized of wanting to pursue all treatments available to fight his cancer. His wife Matt Kent reported that plans are in process for brain radiation to start in the near future. We discussed CODE STATUS. Both patient and wife want CODE STATUS to remain a full code. Comfort support was provided. Bedside nurse notified. We will continue to follow.      Prognosis: Guarded    OBJECTIVE:     /71   Pulse 74   Temp 98.2 °F (36.8 °C) (Oral)   Resp 22   SpO2 94%     Physical Examination:  Gen: elderly, thin, NAD, awake, alert, flat affect, depressed  HEENT: normocephalic, atraumatic, PERRL, EOMI,   Neck: trachea midline, no JVD  Lungs: respirations easy and not labored,   Heart: regular rate and rhythm, distant heart tones,   Abdomen: normoactive bowel sounds, soft, non-tender  Extremities: no clubbing, cyanosis or edema, moving all extremities    Skin: warm, dry without rashes, lesions, bruising  Neuro: awake, alert, oriented x 3, follows commands, no gross neurologic deficit    Objective data reviewed: labs, images, records, medication use, vitals, and chart    Time/Communication  Greater than 50% of time spent, total 50 minutes in counseling and coordination of care at the bedside regarding  CODE STATUS, family support and goals of care. Thank you for allowing Palliative Medicine to participate in the care of Juan Antonio Crawford. Note: This report was completed using [employer] voiced recognition software. Every effort has been made to ensure accuracy; however, inadvertent computerized transcription errors may be present.

## 2022-07-20 NOTE — PLAN OF CARE
Problem: Discharge Planning  Goal: Discharge to home or other facility with appropriate resources  7/20/2022 0935 by Kalen Devlin RN  Outcome: Progressing  7/20/2022 0132 by Annabelle Fang RN  Outcome: Progressing     Problem: Safety - Adult  Goal: Free from fall injury  7/20/2022 0935 by Kalen Devlin RN  Outcome: Progressing  7/20/2022 0132 by Annabelle Fang RN  Outcome: Progressing  Flowsheets (Taken 7/20/2022 0128)  Free From Fall Injury: Instruct family/caregiver on patient safety     Problem: Skin/Tissue Integrity  Goal: Absence of new skin breakdown  Description: 1. Monitor for areas of redness and/or skin breakdown  2. Assess vascular access sites hourly  3. Every 4-6 hours minimum:  Change oxygen saturation probe site  4. Every 4-6 hours:  If on nasal continuous positive airway pressure, respiratory therapy assess nares and determine need for appliance change or resting period.   7/20/2022 0935 by Kalen Devlin RN  Outcome: Progressing  7/20/2022 0132 by Annabelle Fang RN  Outcome: Progressing     Problem: ABCDS Injury Assessment  Goal: Absence of physical injury  7/20/2022 0935 by Kalen Devlin RN  Outcome: Progressing  7/20/2022 0132 by Annabelle Fang RN  Outcome: Progressing  Flowsheets (Taken 7/20/2022 0128)  Absence of Physical Injury: Implement safety measures based on patient assessment

## 2022-07-20 NOTE — PROCEDURES
EEG Report  Gilma Minor is a 62 y.o. male      Appointment Date 7/20/2022 Appointment Time 3:00 PM   Facility Location [facility] EEG Number [id number]   Type of Study Portable EEG Floor 4521-A     Technical Specifications  Technician [facility] of consciousness Awake and Asleep   Sleep deprived? Yes   Hyperventilation tested? No   Photic stim tested? No   EEG recording Standard 10-20 electrode placement    Duration of recording 25 min   EEG complete?  Yes       Clinical History   ***    Medications    Current Facility-Administered Medications:     dexamethasone (DECADRON) tablet 4 mg, 4 mg, Oral, 4 times per day, Noris Roberts MD    [facility]) tablet 5 mg, 5 mg, Oral, BID, Viral Bowden MD, 5 mg at 07/20/22 0835    HYDROcodone-acetaminophen (NORCO) 5-325 MG per tablet 1 tablet, 1 tablet, Oral, Q6H PRN, Viral Bowden MD    levETIRAcetam (KEPPRA) tablet 500 mg, 500 mg, Oral, BID, Viral Bowden MD, 500 mg at 07/20/22 0835    memantine (NAMENDA) tablet 15 mg, 15 mg, Oral, BID, Viral Bowden MD, 15 mg at 07/20/22 0835    sodium chloride flush 0.9 % injection 5-40 mL, 5-40 mL, IntraVENous, 2 times per day, Viral Bowden MD, 10 mL at 07/20/22 0835    sodium chloride flush 0.9 % injection 5-40 mL, 5-40 mL, IntraVENous, PRN, Viral Bowden MD    0.9 % sodium chloride infusion, , IntraVENous, PRN, Viral Bowden MD    ondansetron (ZOFRAN-ODT) disintegrating tablet 4 mg, 4 mg, Oral, Q8H PRN **OR** ondansetron (ZOFRAN) injection 4 mg, 4 mg, IntraVENous, Q6H PRN, Viral Bowden MD    polyethylene glycol (GLYCOLAX) packet 17 g, 17 g, Oral, Daily PRN, Viral Bowden MD    acetaminophen (TYLENOL) tablet 650 mg, 650 mg, Oral, Q6H PRN **OR** acetaminophen (TYLENOL) suppository 650 mg, 650 mg, Rectal, Q6H PRN, Viral Bowden MD    0.9 % sodium chloride infusion, , IntraVENous, Continuous, Viral Bowden MD, Last Rate: 100 mL/hr at 07/20/22 0625, Rate Verify at 07/20/22 1112        Physician Interpretation    General EEG Report  ***    Type of

## 2022-07-21 VITALS
TEMPERATURE: 98 F | RESPIRATION RATE: 16 BRPM | DIASTOLIC BLOOD PRESSURE: 69 MMHG | OXYGEN SATURATION: 95 % | SYSTOLIC BLOOD PRESSURE: 101 MMHG | HEART RATE: 75 BPM

## 2022-07-21 LAB
ALBUMIN SERPL-MCNC: 2.9 G/DL (ref 3.5–5.2)
ALP BLD-CCNC: 228 U/L (ref 40–129)
ALT SERPL-CCNC: 90 U/L (ref 0–40)
ANION GAP SERPL CALCULATED.3IONS-SCNC: 9 MMOL/L (ref 7–16)
AST SERPL-CCNC: 52 U/L (ref 0–39)
BILIRUB SERPL-MCNC: 0.9 MG/DL (ref 0–1.2)
BUN BLDV-MCNC: 31 MG/DL (ref 6–23)
CALCIUM SERPL-MCNC: 9.2 MG/DL (ref 8.6–10.2)
CHLORIDE BLD-SCNC: 100 MMOL/L (ref 98–107)
CHOLESTEROL, TOTAL: 233 MG/DL (ref 0–199)
CO2: 24 MMOL/L (ref 22–29)
CREAT SERPL-MCNC: 0.7 MG/DL (ref 0.7–1.2)
GFR AFRICAN AMERICAN: >60
GFR NON-AFRICAN AMERICAN: >60 ML/MIN/1.73
GLUCOSE BLD-MCNC: 130 MG/DL (ref 74–99)
HCT VFR BLD CALC: 27.4 % (ref 37–54)
HDLC SERPL-MCNC: 54 MG/DL
HEMOGLOBIN: 9.3 G/DL (ref 12.5–16.5)
LDL CHOLESTEROL CALCULATED: 162 MG/DL (ref 0–99)
MCH RBC QN AUTO: 30.6 PG (ref 26–35)
MCHC RBC AUTO-ENTMCNC: 33.9 % (ref 32–34.5)
MCV RBC AUTO: 90.1 FL (ref 80–99.9)
PDW BLD-RTO: 14.8 FL (ref 11.5–15)
PLATELET # BLD: 271 E9/L (ref 130–450)
PMV BLD AUTO: 10.3 FL (ref 7–12)
POTASSIUM SERPL-SCNC: 4.7 MMOL/L (ref 3.5–5)
RBC # BLD: 3.04 E12/L (ref 3.8–5.8)
SODIUM BLD-SCNC: 133 MMOL/L (ref 132–146)
TOTAL PROTEIN: 6.5 G/DL (ref 6.4–8.3)
TRIGL SERPL-MCNC: 85 MG/DL (ref 0–149)
VLDLC SERPL CALC-MCNC: 17 MG/DL
WBC # BLD: 19.8 E9/L (ref 4.5–11.5)

## 2022-07-21 PROCEDURE — 2580000003 HC RX 258: Performed by: INTERNAL MEDICINE

## 2022-07-21 PROCEDURE — 99231 SBSQ HOSP IP/OBS SF/LOW 25: CPT | Performed by: NURSE PRACTITIONER

## 2022-07-21 PROCEDURE — 80061 LIPID PANEL: CPT

## 2022-07-21 PROCEDURE — 6360000002 HC RX W HCPCS: Performed by: PSYCHIATRY & NEUROLOGY

## 2022-07-21 PROCEDURE — 80053 COMPREHEN METABOLIC PANEL: CPT

## 2022-07-21 PROCEDURE — 85027 COMPLETE CBC AUTOMATED: CPT

## 2022-07-21 PROCEDURE — 6370000000 HC RX 637 (ALT 250 FOR IP): Performed by: INTERNAL MEDICINE

## 2022-07-21 RX ORDER — DEXAMETHASONE 4 MG/1
4 TABLET ORAL 2 TIMES DAILY WITH MEALS
Qty: 28 TABLET | Refills: 0 | Status: SHIPPED | OUTPATIENT
Start: 2022-07-21 | End: 2022-08-04

## 2022-07-21 RX ADMIN — DEXAMETHASONE 4 MG: 4 TABLET ORAL at 05:43

## 2022-07-21 RX ADMIN — DEXAMETHASONE 4 MG: 4 TABLET ORAL at 13:20

## 2022-07-21 RX ADMIN — LEVETIRACETAM 500 MG: 500 TABLET, FILM COATED ORAL at 08:23

## 2022-07-21 RX ADMIN — DEXAMETHASONE 4 MG: 4 TABLET ORAL at 00:16

## 2022-07-21 RX ADMIN — MEMANTINE HYDROCHLORIDE 15 MG: 5 TABLET, FILM COATED ORAL at 08:23

## 2022-07-21 RX ADMIN — APIXABAN 5 MG: 5 TABLET, FILM COATED ORAL at 08:23

## 2022-07-21 RX ADMIN — Medication 10 ML: at 08:24

## 2022-07-21 NOTE — PLAN OF CARE
Spoke with patient's wife at bedside and reviewed palliative note. His code status has been changed to [facility]. Patient's wife would like to take him home and does not wish to pursue additional testing for stroke workup. CTAs and MRI brain will be discontinued. Due to size of stroke noted on CT head would recommend holding Eliquis for 7-10 days to minimize risk of hemorrhagic transformation. This was discussed with patient's wife. While off of [address], he will be at an elevated risk for embolic events. He is okay to d/c from neuro POV.  Can follow up outpatient if family wishes    Please call me with any additional questions     Latrell Rivera PA-C

## 2022-07-21 NOTE — PROGRESS NOTES
Hospitalist Progress Note      SYNOPSIS: Patient admitted on 2022 for altered mental status. A 62-year-old male who was recently discharged from an outside hospital after extensive work-up for possible carcinoma and stroke as well as DVT. Patient was discharged home and return to the ER on  at [facility] because of change in mental status. As per patient's wife. Asking multiple questions that did not make sense and he was also becoming more lethargic. No fever, chills, chest pain, shortness of breath, body changes mentioned by patient's wife. In the [facility] ER on , CT head without contrast showed multiple areas of low-attenuation with a new large area in the right temporoparietal occipital region highly suspicious of infarct. 2 other areas of 2.2 cm and 2.9 cm were as previously noted. No significant midline shift. Multiple lytic lesions in the frontal calvarium also noted. Patient was transferred to our hospital for further evaluation and management. SUBJECTIVE:    Patient seen and examined in his room. Alert awake oriented x2. No major overnight events. Denies any chest pain, nausea, vomiting. Records reviewed. Stable overnight. No other overnight issues reported. Temp (24hrs), Av.1 °F (36.7 °C), Min:98 °F (36.7 °C), Max:98.2 °F (36.8 °C)    DIET: ADULT DIET; Regular  CODE: Full Code    Intake/Output Summary (Last 24 hours) at 2022 0832  Last data filed at 2022 0600  Gross per 24 hour   Intake 1220 ml   Output 2000 ml   Net -780 ml       OBJECTIVE:    /65   Pulse 69   Temp 98 °F (36.7 °C)   Resp 17   SpO2 95%     General appearance: No apparent distress, appears stated age and cooperative. HEENT:  Conjunctivae/corneas clear. Neck: Supple. No jugular venous distention.    Respiratory: Clear to auscultation bilaterally, normal respiratory effort  Cardiovascular: Regular rate rhythm, normal S1-S2  Abdomen: Soft, nontender, nondistended  Musculoskeletal: No clubbing, cyanosis, no bilateral lower extremity edema. Brisk capillary refill. Skin:  No rashes  on visible skin  Neurologic: awake, alert and following commands     ASSESSMENT & PLAN:    Altered mental status  Unclear etiology  Can be multifactorial including evolving strokes  Consult neurology  Obtain UA with microscopy, urine never obtained    Elevated troponins  68 and then 87  Troponin remains elevated  Consulted cardiology, recommend ischemic work-up once patient stable    Lung mass with possible local metastasis  Noted during admission in the previous hospital  Oncology recommendations  Final systemic therapy determined based on pathology report    Liver mets  IR guided liver biopsy done during previous hospitalization  Surgical report not back yet  With elevated liver enzymes, likely due to mets    Acute stroke  During previous hospitalization MRI brain showed multiple strokes  Possible new stroke noted on the CT head on 7/18  Consulted neurology, waiting final recommendations, EEG report pending    Brain lesions  Likely metastatic disease  On Keppra and dexamethasone  Follow neurology recommendations    Acute left lower extremity DVT  Patient started on Eliquis, continue    Leukocytosis  Likely reactive as well as from Decadron    DISPOSITION:   On 7/20, patient's wife wanted to take patient home. Will discuss with other teams and if they have no plan for any intervention, plan for discharge home today.     Medications:  REVIEWED DAILY    Infusion Medications    sodium chloride      sodium chloride 100 mL/hr at 07/20/22 1226     Scheduled Medications    dexamethasone  4 mg Oral 4 times per day    apixaban  5 mg Oral BID    levETIRAcetam  500 mg Oral BID    memantine  15 mg Oral BID    sodium chloride flush  5-40 mL IntraVENous 2 times per day     PRN Meds: HYDROcodone-acetaminophen, sodium chloride flush, sodium chloride, ondansetron **OR** ondansetron, polyethylene glycol, acetaminophen **OR** acetaminophen    Labs:     Recent Labs     07/18/22  1133 07/20/22  0425 07/21/22  0447   WBC 18.0* 22.9* 19.8*   HGB 10.5* 10.2* 9.3*   HCT 31.2* 30.8* 27.4*    306 271       Recent Labs     07/18/22  1133 07/20/22  0425 07/21/22  0447    131* 133   K 3.9 4.0 4.7   CL 96* 98 100   CO2 26 21* 24   BUN 28* 28* 31*   CREATININE 0.8 0.8 0.7   CALCIUM 10.0 9.2 9.2       Recent Labs     07/18/22  1133 07/20/22  0425 07/21/22  0447   PROT 7.4 6.9 6.5   ALKPHOS 209* 204* 228*   ALT 81* 93* 90*   AST 53* 53* 52*   BILITOT 0.5 0.7 0.9       Recent Labs     07/18/22  1133   INR 1.4       No results for input(s): Lizzy Kras in the last 72 hours. Chronic labs:    Lab Results   Component Value Date    TSH 0.982 07/13/2022    INR 1.4 07/18/2022    LABA1C 5.4 07/13/2022       Radiology: REVIEWED DAILY    +++++++++++++++++++++++++++++++++++++++++++++++++  Ren Masters MD  [facility] Physician - [address] [place] [address]  +++++++++++++++++++++++++++++++++++++++++++++++++  NOTE: This report was transcribed using voice recognition software. Every effort was made to ensure accuracy; however, inadvertent computerized transcription errors may be present.

## 2022-07-21 NOTE — PROGRESS NOTES
[facility]  Hematology/Oncology  Consult      Patient Name: Esperanza Salazar  YOB: 1960  PCP: No primary care provider on file. Referring Provider: [address] / [place] [id number]     Reason for Consultation: No chief complaint on file. Subjective:  No new issues overnight. Afebrile    History of Present Illness: This is a 28-year-old male patient who was recently hospitalized and had extensive work-up for stroke and a lung mass with metastatic disease. CT chest with large necrotic soft tissue mass in the RLL encompasses in the inferior R hilum. Metastatic mediastinal and R hilar lymphadenopathy. Tiny satellite nodules in the RLL. Multitude of tiny centrilobular nodules and density suspicious for tree-in-bud opacities in the RML. BL lung nodules. Osseous metastatic disease. CT A/P large necrotic soft tissue mass in the RLL of the lungs which encompasses the inferior R hilum. Liver metastases with a splenic infarct. BL adrenal gland lesions. Hypodense areas involving both renal parenchyma L>R. Diverticulosis. Enlarged prostate gland. Mildly enlarged gastrohepatic LN. Osseous metastases. MRI brain showed multiple enhancing intraparenchymal lesions with associated edema and mass-effect in the left frontal lobe without midline shift. Enhancing lesions in the frontal calvarium. Patient was started on dexamethasone and keppra. Radiation oncology was consulted for whole brain radiation with plans for 30 Gray in 10 fractions. Patient then underwent a CT-guided liver biopsy with pathology, molecular markers, and PD-L1 pending. Final systemic therapy to be determined pending pathology. Patient was also found to have a DVT and was started on Eliquis. Patient presented to the ED for current admission for evaluation of worsening altered mental status.  CT head showed multiple areas of low-attenuation with a new large area in the R temporoparietal occipital region highly suspicious of infarct. 2 other areas of 2.2 cm and 2.9 cm were as previously noted. No significant midline shift. Multiple lytic lesions in the frontal calvarium also noted. Patient was transferred to [facility]. Neurology consulted for possible new stroke. LFT's with transaminitis with slight elevation of ammonia 61.7. CBC with leukocytosis related to steroid use. Hgb 10.5, platelets normal.  Consultation for management of cancer. Diagnostic Data:     No past medical history on file. Patient Active Problem List    Diagnosis Date Noted    Anemia 07/20/2022    AMS (altered mental status) 07/19/2022    Back pain 07/13/2022    Elevated troponin     Atypical angina ([address]     Lung mass 07/12/2022    Primary malignant neoplasm of lung metastatic to other site [facility]) 07/12/2022    Metastatic malignant neoplasm (HCC)     Shortness of breath     Tobacco abuse     Paresthesia         Past Surgical History:   Procedure Laterality Date    CT NEEDLE BIOPSY LIVER PERCUTANEOUS  7/14/2022    CT NEEDLE BIOPSY LIVER PERCUTANEOUS 7/14/2022 [facility] CT       Family History  No family history on file. Social History    TOBACCO:   reports that he has been smoking cigarettes. He has been smoking an average of .75 packs per day. He has never used smokeless tobacco.  ETOH:   reports current alcohol use of about 1.0 standard drink per week. Home Medications  Prior to Admission medications    Medication Sig Start Date End Date Taking? Authorizing Provider   memantine (NAMENDA) 5 MG tablet Take 1 tablet by mouth in the morning and 1 tablet before bedtime. Take 1 tabled daily week 1, Take 1 tablet BID week 2, Take 1 tablet BID week 3 , Then follow with the 10 mg prescription. 7/18/22   Mame Key MD   memantine (NAMENDA) 10 MG tablet Take 1 tablet by mouth in the morning and 1 tablet before bedtime.  Starting on week 4. 7/18/22   Mame Key MD   HYDROcodone-acetaminophen (NORCO) 5-325 MG per tablet Take 1 tablet by mouth every 6 hours as needed for Pain for up to 7 days. Intended supply: 7 days. Take lowest dose possible to manage pain 7/15/22 7/22/22  Tonia Luna MD   apixaban (ELIQUIS) 5 MG TABS tablet Take 1 tablet by mouth in the morning and 1 tablet before bedtime. 7/15/22 8/14/22  Tonia Luna MD   levETIRAcetam (KEPPRA) 500 MG tablet Take 1 tablet by mouth in the morning and 1 tablet before bedtime. 7/15/22   Tonia Luna MD   dexamethasone (DECADRON) 4 MG tablet Take 1 tablet by mouth in the morning and 1 tablet in the evening. Take with meals. Do all this for 7 days. 7/15/22 7/22/22  Tonia Luna MD   polyethylene glycol (GLYCOLAX) 17 g packet Take 17 g by mouth daily as needed for Constipation 7/15/22 8/14/22  Tonia Luna MD   ibuprofen (ADVIL;MOTRIN) 800 MG tablet Take 800 mg by mouth every 6 hours as needed for Pain  7/15/22  Historical Provider, MD       Allergies  No Known Allergies    Review of Systems: Generalized fatigue. Feels he is more oriented than when first came in, intermittently slow to answer. Objective  /65   Pulse 69   Temp 98 °F (36.7 °C)   Resp 17   SpO2 95%     Physical Exam:   Performance Status:  General: Alert but intermittently confused  Head and neck : PERRLA, EOMI . Sclera non icteric. Oropharynx : Clear  Neck: no JVD,  no adenopathy  Heart: Regular rate and regular rhythm, no murmur  Lungs: Clear to auscultation   Extremities: No edema,no cyanosis, no clubbing. Abdomen: Soft, non-tender;no masses, no organomegaly  Skin:  No rash. Neurologic:Cranial nerves grossly intact. No focal motor or sensory deficits .     Recent Laboratory Data-   Lab Results   Component Value Date    WBC 19.8 (H) 07/21/2022    HGB 9.3 (L) 07/21/2022    HCT 27.4 (L) 07/21/2022    MCV 90.1 07/21/2022     07/21/2022    LYMPHOPCT 6.9 (L) 07/18/2022    RBC 3.04 (L) 07/21/2022    MCH 30.6 07/21/2022    MCHC 33.9 07/21/2022    RDW 14.8 07/21/2022    NEUTOPHILPCT 87.1 (H) 07/18/2022 MONOPCT 5.2 07/18/2022    BASOPCT 0.0 07/18/2022    NEUTROABS 15.66 (H) 07/18/2022    LYMPHSABS 1.26 (L) 07/18/2022    MONOSABS 0.90 07/18/2022    EOSABS 0.00 (L) 07/18/2022    BASOSABS 0.00 07/18/2022       Lab Results   Component Value Date     07/21/2022    K 4.7 07/21/2022     07/21/2022    CO2 24 07/21/2022    BUN 31 (H) 07/21/2022    CREATININE 0.7 07/21/2022    GLUCOSE 130 (H) 07/21/2022    CALCIUM 9.2 07/21/2022    PROT 6.5 07/21/2022    LABALBU 2.9 (L) 07/21/2022    BILITOT 0.9 07/21/2022    ALKPHOS 228 (H) 07/21/2022    AST 52 (H) 07/21/2022    ALT 90 (H) 07/21/2022    LABGLOM >60 07/21/2022    GFRAA >60 07/21/2022       No results found for: IRON, TIBC, FERRITIN        Radiology-    Echo Complete    Result Date: 7/13/2022  Transthoracic Echocardiography Report (TTE)  Demographics   Patient Name   Marquita Najjar Gender            Male                 P   Medical Record [id number]      Room Number       0603  Number   Account #      [de-identified]     Procedure Date    07/13/2022   Corporate ID                 Ordering          Kelsy Johnston MD                               Physician   Accession      [id number]    Referring  Number                       Physician   Date of Birth  1960    Sonographer       Martin Lopes   Age            62 year(s)    Interpreting      [address]                               Physician         Physician Cardiology                                                 Rohan Foy MD                                Any Other  Procedure Type of Study   TTE procedure:Echo Complete W/Doppler & Color Flow. Procedure Date Date: 07/13/2022 Start: 09:06 AM Study Location: Echo Lab Technical Quality: Adequate visualization Indications:Abnormal ECG and Left ventricle systolic dysfunction. Patient Status: Routine Height: 69 inches Weight: 143 pounds BSA: 1.79 m^2 BMI: 21.12 kg/m^2 HR: 80 bpm BP: 108/69 mmHg  Findings   Left Ventricle  Normal left ventricular chamber size. Normal left ventricular systolic function. Visually estimated LVEF is 55-60 %. No wall motion abnormalities. Normal diastolic function. Normal left atrial pressure. Right Ventricle  Normal right ventricle structure and function. Left Atrium  Normal left atrial size. Right Atrium  Normal right atrial size   Mitral Valve  Physiologic and/or trace mitral regurgitation is present. No evidence of hemodynamically significant mitral stenosis. Tricuspid Valve  The tricuspid valve appears structurally normal.  Physiologic and/or trace tricuspid regurgitation. Aortic Valve  Trileaflet aortic valve. Normal leaflet mobility. No aortic stenosis. No aortic regurgitation. Pulmonic Valve  Normal pulmonic valve structure and function. Physiologic and/or trace pulmonic regurgitation present. Pericardial Effusion  No evidence for hemodynamically significant pericardial effusion. Pleural Effusion  No evidence of pleural effusion. Aorta  Normal aortic root and ascending aorta. Miscellaneous  The inferior vena cava diameter is normal with normal respiratory  variation. Unable to estimate PA pressure. Conclusions   Summary  Normal left ventricular chamber size. Normal left ventricular systolic function. Visually estimated LVEF is 55-60 %. No wall motion abnormalities. Normal diastolic function. Normal left atrial pressure. Normal right ventricle structure and function. Normal left atrial size. Normal right atrial size  Unable to estimate PA pressure. No comparison study available.    Signature   ----------------------------------------------------------------  Electronically signed by Victoriano Zhu MD(Interpreting  physician) on 07/13/2022 01:20 PM  ----------------------------------------------------------------  M-Mode/2D Measurements & Calculations   LV Diastolic    LV Systolic Dimension: 4.1   AV Cusp Separation: 1.5 cmLA  Dimension: 5.2  cm                           Dimension: 3.2 cmAO Root  cm LV Volume Diastolic: 356.6   Dimension: 3 cm  LV FS:21.2 %    ml  LV PW           LV Volume Systolic: 82.1 ml  Diastolic: 0.9  LV EDV/LV EDV Index: 130.8  cm              ml/73 ml/m^2LV ESV/LV ESV    RV Diastolic Dimension: 3.1  LV PW Systolic: Index: 04.2 EC/83KD/ m^2     cm  1.6 cm          EF Calculated: 43.5 %  Septum          LV Mass Index: 108 l/min*m^2 LA/Aorta: 4.28  Diastolic: 1.1  LV Length: 9.6 cm            Ascending Aorta: 3 cm  cm                                           LA volume/Index: 35.5 ml  Septum          LVOT: 2 cm                   /83ET/R^2  Systolic: 1.1  cm  CO: 5.28 l/min                               IVC Expiration: 1.1 cm  CI: 2.82  l/m*m^2  LV Mass: 194.16  g  Doppler Measurements & Calculations   MV Peak E-Wave: 0.64 AV Peak Velocity:     LVOT Peak Velocity: 0.88 m/s  m/s                  1.15 m/s              LVOT Mean Velocity: 0.73 m/s  MV Peak A-Wave: 0.73 AV Peak Gradient:     LVOT Peak Gradient: 3.1  m/s                  5.32 mmHg             mmHgLVOT Mean Gradient: 2.3  MV E/A Ratio: 0.88   AV Mean Velocity:     mmHg  MV Peak Gradient:    0.82 m/s  3.2 mmHg             AV Mean Gradient: 3.2  MV Mean Gradient:    mmHg  1.4 mmHg             AV VTI: 22.5 cm       TR Velocity:2.28 m/s  MV Mean Velocity:    AV Area               TR Gradient:20.85 mmHg  0.55 m/s             (Continuity):2.81     PV Peak Velocity: 1.1 m/s  MV Deceleration      cm^2                  PV Peak Gradient: 4.86 mmHg  Time: 161.4 msec                           PV Mean Velocity: 0.79 m/s  MV P1/2t: 37.6 msec  LVOT VTI: 20.1 cm     PV Mean Gradient: 2.9 mmHg  MVA by PHT:5.85 cm^2  MV Area  (continuity): 2.8  cm^2  MV E' Septal  Velocity: 0.06 m/s  MV E' Lateral  Velocity: 11 m/s  [email or web address] [id number]    XR CHEST STANDARD (2 VW)    Result Date: 7/12/2022  EXAMINATION: THREE XRAY VIEWS OF THE THORACIC SPINE; 2 XRAY VIEWS OF THE CERVICAL SPINE; TWO XRAY VIEWS OF THE CHEST 7/12/2022 7:41 am COMPARISON: None. HISTORY: ORDERING SYSTEM PROVIDED HISTORY: Dorsalgia of thoracic region TECHNOLOGIST PROVIDED HISTORY: Reason for exam:->mid back pain; ORDERING SYSTEM PROVIDED HISTORY: Cervical radiculopathy TECHNOLOGIST PROVIDED HISTORY: Reason for exam:->back pain, neck pain radiculopathy; ORDERING SYSTEM PROVIDED HISTORY: Dorsalgia of thoracic region TECHNOLOGIST PROVIDED HISTORY: Reason for exam:->mid back pain for 2-3 weeks pain down left arm FINDINGS: Chest: A mass measuring approximately 5 cm in diameter is located posterior to the right pulmonary hilum and is highly suspicious for neoplasm. Contrast-enhanced CT of the chest is recommended for further evaluation. Normal heart and pulmonary vascularity. Neither costophrenic angle is blunted. C-spine: Moderate C6-7 degenerative disc disease. No fracture or dislocation. Normal soft tissues. T-spine: Moderate multilevel degenerative disc disease. No fracture or dislocation. Right lung mass projecting posterior to the right pulmonary hilum which is quite suspicious for underlying neoplasm. Contrast-enhanced CT of the chest is recommended. Degenerative cervical and thoracic spondylosis. XR CERVICAL SPINE (2-3 VIEWS)    Result Date: 7/12/2022  EXAMINATION: THREE XRAY VIEWS OF THE THORACIC SPINE; 2 XRAY VIEWS OF THE CERVICAL SPINE; TWO XRAY VIEWS OF THE CHEST 7/12/2022 7:41 am COMPARISON: None.  HISTORY: ORDERING SYSTEM PROVIDED HISTORY: Dorsalgia of thoracic region TECHNOLOGIST PROVIDED HISTORY: Reason for exam:->mid back pain; ORDERING SYSTEM PROVIDED HISTORY: Cervical radiculopathy TECHNOLOGIST PROVIDED HISTORY: Reason for exam:->back pain, neck pain radiculopathy; ORDERING SYSTEM PROVIDED HISTORY: Dorsalgia of thoracic region TECHNOLOGIST PROVIDED HISTORY: Reason for exam:->mid back pain for 2-3 weeks pain down left arm FINDINGS: Chest: A mass measuring approximately 5 cm in diameter is located posterior to the right pulmonary hilum and is highly suspicious for neoplasm. Contrast-enhanced CT of the chest is recommended for further evaluation. Normal heart and pulmonary vascularity. Neither costophrenic angle is blunted. C-spine: Moderate C6-7 degenerative disc disease. No fracture or dislocation. Normal soft tissues. T-spine: Moderate multilevel degenerative disc disease. No fracture or dislocation. Right lung mass projecting posterior to the right pulmonary hilum which is quite suspicious for underlying neoplasm. Contrast-enhanced CT of the chest is recommended. Degenerative cervical and thoracic spondylosis. XR THORACIC SPINE (3 VIEWS)    Result Date: 7/12/2022  EXAMINATION: THREE XRAY VIEWS OF THE THORACIC SPINE; 2 XRAY VIEWS OF THE CERVICAL SPINE; TWO XRAY VIEWS OF THE CHEST 7/12/2022 7:41 am COMPARISON: None. HISTORY: ORDERING SYSTEM PROVIDED HISTORY: Dorsalgia of thoracic region TECHNOLOGIST PROVIDED HISTORY: Reason for exam:->mid back pain; ORDERING SYSTEM PROVIDED HISTORY: Cervical radiculopathy TECHNOLOGIST PROVIDED HISTORY: Reason for exam:->back pain, neck pain radiculopathy; ORDERING SYSTEM PROVIDED HISTORY: Dorsalgia of thoracic region TECHNOLOGIST PROVIDED HISTORY: Reason for exam:->mid back pain for 2-3 weeks pain down left arm FINDINGS: Chest: A mass measuring approximately 5 cm in diameter is located posterior to the right pulmonary hilum and is highly suspicious for neoplasm. Contrast-enhanced CT of the chest is recommended for further evaluation. Normal heart and pulmonary vascularity. Neither costophrenic angle is blunted. C-spine: Moderate C6-7 degenerative disc disease. No fracture or dislocation. Normal soft tissues. T-spine: Moderate multilevel degenerative disc disease. No fracture or dislocation.      Right lung mass projecting posterior to the right pulmonary hilum which is quite suspicious for underlying compatible with previous assessment of skeletal metastases. Further evaluation with contrast enhanced brain MRI and/or perfusion/diffusion study commended. Critical results were called by Dr. Saqib Stevens to Dr. Ivania Minor  on 7/18/2022 at 12:29 p.m.     CT CHEST W CONTRAST    Addendum Date: 7/13/2022    ADDENDUM: A request was made by the referring physician to assess whether there is pulmonary embolus are not. The study is limited to fully assess for pulmonary emboli, since there is suboptimal opacification of the pulmonary arteries and their tributaries. Regardless, there is no evidence to suggest pulmonary embolus. Result Date: 7/13/2022  EXAMINATION: CT OF THE CHEST WITH CONTRAST 7/12/2022 2:17 pm TECHNIQUE: CT of the chest was performed with the administration of intravenous contrast. Multiplanar reformatted images are provided for review. Automated exposure control, iterative reconstruction, and/or weight based adjustment of the mA/kV was utilized to reduce the radiation dose to as low as reasonably achievable. COMPARISON: None. HISTORY: ORDERING SYSTEM PROVIDED HISTORY: mass on XR TECHNOLOGIST PROVIDED HISTORY: Reason for exam:->mass on XR Decision Support Exception - unselect if not a suspected or confirmed emergency medical condition->Emergency Medical Condition (MA) FINDINGS: Mediastinum: Multiple, enlarged mediastinal lymph nodes are noted involving the paratracheal and subcarinal spaces. The largest lymph node is identified in the subcarinal space, measuring 1.8 x 1.6 cm in size. This indicates metastatic disease. There is a conglomeration of enlarged right hilar and suprahilar lymph nodes, measuring 3.6 x 2.9 cm in size. This also indicates metastatic disease. No left hilar or axillary lymph nodes are present. The thoracic aorta is minimally atherosclerotic. The remainder of the visualized pulmonary vasculature is unremarkable. The heart is within normal limits in size.   The thyroid gland and esophagus are unremarkable. A small amount of decreased attenuation is identified within the anterior trachea, probably representing mucous. Lungs/pleura: There is a large, necrotic soft tissue mass identified in the right lower lobe, which also encompasses the inferior right hilum. It measures 9.2 x 5.6 cm in size. It is spiculated in appearance. This is indicative of malignancy. Subcentimeter satellite nodules are noted in the right lower lobe, posterior to the previously described mass. A multitude of tiny centrilobular nodules and densities suspicious for tree-in-bud opacities are identified in the right middle lobe. Similar, but much less significant findings are noted in the superior segment of the right lower lobe. This could represent infective bronchiolitis or other inflammatory disorder, however, the presence of tumoral spread cannot be excluded. A 0.7 cm ill-defined nodule is seen in the right upper lobe (image 42 and series 302). A smaller nodule is noted in the left upper lobe (image 56 on series 302). Punctate nodules are seen in the left lower lobe (images 97 and 120 on series 302). It cannot be determined if all these nodules are neoplastic or inflammatory. A 0.2 cm perifissural lymph node is noted along the left major fissure. No other pulmonary parenchymal nodule or mass is seen. There is no evidence of acute consolidation or infiltrate. No active pleural disease is identified. Centrilobular emphysema is noted. Upper Abdomen: Findings discussed in full detail on the report from the patient's CT scan of the abdomen and pelvis performed the same day. Please refer to that transcription. Soft Tissues/Bones: Expansile, lytic lesions are noted involving the right 2nd and 9th ribs, as well as the left 3rd rib. The largest is noted involving the right 2nd rib and measures 3.1 x 2.9 cm. This is consistent with osseous metastatic disease.   Other, lytic lesions are noted involving multiple cervical, thoracic, and lumbar spine vertebral bodies. 1.  Large, necrotic soft tissue mass in the right lower lobe, which also encompasses the inferior right hilum, consistent with malignancy. 2.  Metastatic mediastinal and right hilar lymphadenopathy. 3.  Tiny satellite nodules in the right lower lobe, posterior to the previously described mass. 4.  Multitude of tiny centrilobular nodules and density suspicious for tree-in-bud opacities in the right middle lobe. Similar, but much less significant findings are seen in the superior segment of the right lower lobe. Question infective bronchiolitis or other inflammatory disorder. Tumoral spread cannot be excluded. 5.  Bilateral lung nodules, as described above. It cannot be determined if these nodules are neoplastic or inflammatory. 6.  Centrilobular emphysema. 7.  Abdominal findings discussed in full detail on the report from the patient's CT scan of the abdomen and pelvis performed the same day. Please refer to that transcription. 8.  Osseous metastatic disease. CT GUIDED NEEDLE PLACEMENT    Result Date: 7/14/2022  PROCEDURE: CT NEEDLE BIOPSY LIVER PERCUTANEOUS; CT GUIDED NDL PLACEMENT 7/14/2022 HISTORY: ORDERING SYSTEM PROVIDED HISTORY: mass TECHNOLOGIST PROVIDED HISTORY: Reason for exam:->mass Multiple hepatic lesions. TECHNIQUE: The patient was placed in the supine position and multiple unenhanced axial images of the liver were performed. Automated exposure control, iterative reconstruction, and/or weight based adjustment of the mA/kV was utilized to reduce the radiation dose to as low as reasonably achievable. CONTRAST: None SEDATION: None FLUOROSCOPY DOSE AND TYPE OR TIME AND EXPOSURES: Total CT fluoroscopy time 6.4 seconds. Total exam .79 mGy-cm. Number of images: 072 [id number] OF PROCEDURE: Informed consent was obtained after a detailed explanation of the procedure including risks, benefits, and alternatives.   Universal protocol was observed. Sterile gowns, masks, hats and gloves utilized for maximal sterile barrier. Time-out was called and the patient's order and identity were verified. Under sterile conditions and CT guidance, a 17 gauge guiding needle was placed into the anterior aspect of a lesion in the medial segment of the left hepatic lobe. 4 core biopsy specimens were obtained with an 18 gauge biopsy gun. The patient tolerated the procedure well. FINDINGS: Initial images demonstrate multiple low-density lesions in the liver. Intra procedural images demonstrate good needle position at the anterior aspect of the lesion in the medial segment of the left hepatic lobe. Post biopsy images show no significant hemorrhage about the biopsy site. Status post CT-guided biopsy of lesion in the left lobe of the liver. CT ABDOMEN PELVIS W IV CONTRAST Additional Contrast? None    Result Date: 7/12/2022  EXAMINATION: CT OF THE ABDOMEN AND PELVIS WITH CONTRAST 7/12/2022 2:17 pm TECHNIQUE: CT of the abdomen and pelvis was performed with the administration of intravenous contrast. Multiplanar reformatted images are provided for review. Automated exposure control, iterative reconstruction, and/or weight based adjustment of the mA/kV was utilized to reduce the radiation dose to as low as reasonably achievable. COMPARISON: None. HISTORY: ORDERING SYSTEM PROVIDED HISTORY: mass on CXR, back pain TECHNOLOGIST PROVIDED HISTORY: Additional Contrast?->None Reason for exam:->mass on CXR, back pain Decision Support Exception - unselect if not a suspected or confirmed emergency medical condition->Emergency Medical Condition (MA) FINDINGS: Lower Chest: There is a large, necrotic soft tissue mass seen in the right lower lobe, which also encompasses the inferior right hilum. It measures 9.2 x 5.6 cm in size. It is spiculated in appearance. The finding is indicative of malignancy.   Further discussion can be found on the report from the patient's CT scan of the chest performed the same time. There is eventration of the posterior left hemidiaphragm Organs: Multiple, solid, small, hypodense lesions are identified throughout the liver. The largest measures 2.4 cm in diameter. This is consistent with metastatic disease. Along the superior aspects of the spleen, there is a poorly defined area of hypodensity, measuring 4.4 by 2.6 by 2.7 cm in size. The hypodensity extends to the capsule. This most likely represents a splenic infarct. The gallbladder, biliary tree, and pancreas are unremarkable. A 1.3 x 0.9 cm hypodensity is seen within the right adrenal gland. The Hounsfield units range from 9-22. This could represent an adenoma, however, the presence of metastatic disease cannot be excluded. 2 solid-appearing, small left adrenal gland lesions are identified, with the largest measuring 1.5 x 1.2 cm. Same differential diagnosis exists. MRI of the adrenal glands can be considered for further evaluation. Bilateral renal cortical cysts are noted, of which the largest is noted in the lower pole of the right kidney, measuring 2.1 x 1.9 cm in size. Multiple areas of ill-defined decreased attenuation are noted within both renal parenchyma, left greater than right. The 2 largest areas are also noted within the left kidney. The 1 in the upper pole measures 3.1 x 3.3 cm. The one in the midpole measures 3.4 x 3.3 cm. There is no evidence of a cortical rim sign. Given the presence of the splenic infarct, these could represent bilateral renal infarcts without a cortical rim sign, however, the presence of pyelonephritis cannot be excluded. Metastatic disease is also included in the differential diagnosis. Further evaluation needs to be based on clinical grounds. GI/Bowel: The stomach is suboptimally distended, but grossly unremarkable. The small bowel is unremarkable for a non oral contrast study.   Multiple diverticula are noted involving the sigmoid and descending colon.  A moderate amount of retained stool is noted throughout the colon. The appendix is not definitely visualized. Pelvis: The urinary bladder is distended and unremarkable. The seminal vesicles are symmetric in size. The prostate gland is enlarged. Peritoneum/Retroperitoneum: A mildly enlarged gastrohepatic lymph node is noted, measuring 1.7 x 1.1 cm in size. No retroperitoneal or pelvic lymphadenopathy is identified. No free fluid is seen in the abdomen and pelvis. No abdominal or pelvic soft tissue mass is noted. The abdominal aorta is atherosclerotic. Bones/Soft Tissues: A small, expansile, lytic lesion is noted involving the posterolateral right 9th rib. Multiple lytic lesions are identified throughout the visualized thoracolumbar spine, as well as sacrum and iliac bones expansile lesions with cortical breakthrough are identified involving the right iliac bone, as well as left sacral ala. These is consistent with metastatic disease. 1.  Large, necrotic soft tissue mass in the right lower lobe of the lungs, which in [place] is the inferior right hilum. This is described in full detail on the report from the patient's CT scan of the chest performed the same day. Please refer to that transcription. 2.  Liver metastases. 3.  A splenic infarct. 4.  Bilateral adrenal gland lesions, which may represent adenomata. Metastatic disease cannot be excluded. MRI of the adrenal glands can be considered for further evaluation. 5.  Bilateral renal cortical cysts. 6.  Hypodense areas involving both renal parenchyma, left greater than right. These could represent bilateral infarcts without cortical rim sign, however, pyelonephritis cannot be excluded. Metastatic disease is also in the differential diagnosis. Clinical correlation is recommended. 7.  Colonic diverticulosis. 8.  Enlarged prostate gland. 9.  Mildly enlarged gastrohepatic lymph node. 10.  Osseous metastases, as described above.      XR CHEST PORTABLE    Result Date: 7/18/2022  EXAMINATION: ONE XRAY VIEW OF THE CHEST 7/18/2022 12:05 pm COMPARISON: Previous CT scan of the chest dated 07/12/2022 HISTORY: ORDERING SYSTEM PROVIDED HISTORY: fatigue TECHNOLOGIST PROVIDED HISTORY: Reason for exam:->fatigue FINDINGS: The cardiac silhouette is within normals. There is a mass seen within the right hilum. The finding is unchanged compared to prior CT scan. The left lung is clear. There are no findings of pneumonia. 1. No interval change in the large right perihilar mass 2. There are no findings of pneumonia within the right upper lobe or left lung. CT NEEDLE BIOPSY LIVER PERCUTANEOUS    Result Date: 7/14/2022  PROCEDURE: CT NEEDLE BIOPSY LIVER PERCUTANEOUS; CT GUIDED NDL PLACEMENT 7/14/2022 HISTORY: ORDERING SYSTEM PROVIDED HISTORY: mass TECHNOLOGIST PROVIDED HISTORY: Reason for exam:->mass Multiple hepatic lesions. TECHNIQUE: The patient was placed in the supine position and multiple unenhanced axial images of the liver were performed. Automated exposure control, iterative reconstruction, and/or weight based adjustment of the mA/kV was utilized to reduce the radiation dose to as low as reasonably achievable. CONTRAST: None SEDATION: None FLUOROSCOPY DOSE AND TYPE OR TIME AND EXPOSURES: Total CT fluoroscopy time 6.4 seconds. Total exam .79 mGy-cm. Number of images: 533 [id number] OF PROCEDURE: Informed consent was obtained after a detailed explanation of the procedure including risks, benefits, and alternatives. Universal protocol was observed. Sterile gowns, masks, hats and gloves utilized for maximal sterile barrier. Time-out was called and the patient's order and identity were verified. Under sterile conditions and CT guidance, a 17 gauge guiding needle was placed into the anterior aspect of a lesion in the medial segment of the left hepatic lobe. 4 core biopsy specimens were obtained with an 18 gauge biopsy gun.   The patient tolerated the procedure well. FINDINGS: Initial images demonstrate multiple low-density lesions in the liver. Intra procedural images demonstrate good needle position at the anterior aspect of the lesion in the medial segment of the left hepatic lobe. Post biopsy images show no significant hemorrhage about the biopsy site. Status post CT-guided biopsy of lesion in the left lobe of the liver. US DUP LOWER EXTREMITY LEFT GENA    Result Date: 7/13/2022  EXAMINATION: DUPLEX VENOUS ULTRASOUND OF THE LEFT LOWER EXTREMITY 7/13/2022 7:33 pm TECHNIQUE: Duplex ultrasound using B-mode/gray scaled imaging and Doppler spectral analysis and color flow was obtained of the deep venous structures of the left lower extremity. COMPARISON: None. HISTORY: ORDERING SYSTEM PROVIDED HISTORY: assess dvt TECHNOLOGIST PROVIDED HISTORY: Reason for exam:->assess dvt What reading provider will be dictating this exam?->CRC FINDINGS: There is occlusive thrombus demonstrated at level of mid left calf appearing within left soleal vein. There is also non compressibility at this level suggesting deep vein thrombosis. Remaining veins of left lower extremity show normal compressibility and Doppler blood flow. Findings are suggestive of occlusive deep vein thrombosis including left soleal vein. Remaining veins of left lower extremity show no evidence of deep vein thrombosis. MRI BRAIN W WO CONTRAST    Result Date: 7/14/2022  EXAMINATION: MRI OF THE BRAIN WITHOUT AND WITH CONTRAST  7/13/2022 9:51 pm TECHNIQUE: Multiplanar multisequence MRI of the head/brain was performed without and with the administration of intravenous contrast. COMPARISON: None.  HISTORY: ORDERING SYSTEM PROVIDED HISTORY: Stage IV lung cancer, complete staging TECHNOLOGIST PROVIDED HISTORY: Reason for exam:->Stage IV lung cancer, compelte staging FINDINGS: INTRACRANIAL STRUCTURES/VENTRICLES:  There are few small foci of diffusion restriction involving the bilateral frontal,

## 2022-07-21 NOTE — DISCHARGE SUMMARY
HCT 30.8* 27.4*   MCV 91.1 90.1   RDW 14.6 14.8    271     BMP:   Recent Labs     07/20/22  0425 07/21/22  0447   * 133   K 4.0 4.7   CL 98 100   CO2 21* 24   BUN 28* 31*   CREATININE 0.8 0.7     LFT:  Recent Labs     07/20/22  0425 07/21/22  0447   PROT 6.9 6.5   ALKPHOS 204* 228*   ALT 93* 90*   AST 53* 52*   BILITOT 0.7 0.9     PT/INR: No results for input(s): INR, APTT in the last 72 hours. BNP: No results for input(s): BNP in the last 72 hours.   Hgb A1C:   Lab Results   Component Value Date    LABA1C 5.4 07/13/2022     Folate and B12:   Lab Results   Component Value Date    JROABZEN17 >2000 (H) 07/13/2022   ,   Lab Results   Component Value Date    FOLATE 11.5 07/13/2022     Thyroid Studies:   Lab Results   Component Value Date    TSH 0.982 07/13/2022       Urinalysis:    Lab Results   Component Value Date/Time    NITRU Negative 07/12/2022 07:13 PM    WBCUA 0-1 07/12/2022 07:13 PM    BACTERIA NONE SEEN 07/12/2022 07:13 PM    RBCUA 0-1 07/12/2022 07:13 PM    BLOODU TRACE-INTACT 07/12/2022 07:13 PM    SPECGRAV 1.020 07/12/2022 07:13 PM    GLUCOSEU 100 07/12/2022 07:13 PM       Imaging:  Echo Complete    Result Date: 7/13/2022  Transthoracic Echocardiography Report (TTE)  Demographics   Patient Name   Mary Vergara Gender            Male                 [facility] Record [id number]      Room Number       0603  Number   Account #      [de-identified]     Procedure Date    07/13/2022   Corporate ID                 Ordering          Dmitriy Miranda MD                               Physician   Accession      [id number]    Referring  Number                       Physician   Date of Birth  1960    Sonographer       Martin Lopes   Age            62 year(s)    Interpreting      [address] [place] [address]                               Physician         Physician Cardiology                                                 Victoriano Zhu MD                                Any Other  Procedure Type of Study   TTE procedure:Echo Complete W/Doppler & Color Flow. Procedure Date Date: 07/13/2022 Start: 09:06 AM Study Location: Echo Lab Technical Quality: Adequate visualization Indications:Abnormal ECG and Left ventricle systolic dysfunction. Patient Status: Routine Height: 69 inches Weight: 143 pounds BSA: 1.79 m^2 BMI: 21.12 kg/m^2 HR: 80 bpm BP: 108/69 mmHg  Findings   Left Ventricle  Normal left ventricular chamber size. Normal left ventricular systolic function. Visually estimated LVEF is 55-60 %. No wall motion abnormalities. Normal diastolic function. Normal left atrial pressure. Right Ventricle  Normal right ventricle structure and function. Left Atrium  Normal left atrial size. Right Atrium  Normal right atrial size   Mitral Valve  Physiologic and/or trace mitral regurgitation is present. No evidence of hemodynamically significant mitral stenosis. Tricuspid Valve  The tricuspid valve appears structurally normal.  Physiologic and/or trace tricuspid regurgitation. Aortic Valve  Trileaflet aortic valve. Normal leaflet mobility. No aortic stenosis. No aortic regurgitation. Pulmonic Valve  Normal pulmonic valve structure and function. Physiologic and/or trace pulmonic regurgitation present. Pericardial Effusion  No evidence for hemodynamically significant pericardial effusion. Pleural Effusion  No evidence of pleural effusion. Aorta  Normal aortic root and ascending aorta. Miscellaneous  The inferior vena cava diameter is normal with normal respiratory  variation. Unable to estimate PA pressure. Conclusions   Summary  Normal left ventricular chamber size. Normal left ventricular systolic function. Visually estimated LVEF is 55-60 %. No wall motion abnormalities. Normal diastolic function. Normal left atrial pressure. Normal right ventricle structure and function. Normal left atrial size. Normal right atrial size  Unable to estimate PA pressure. No comparison study available. Signature   ----------------------------------------------------------------  Electronically signed by Jerie Halsted MD(Interpreting  physician) on 07/13/2022 01:20 PM  ----------------------------------------------------------------  M-Mode/2D Measurements & Calculations   LV Diastolic    LV Systolic Dimension: 4.1   AV Cusp Separation: 1.5 cmLA  Dimension: 5.2  cm                           Dimension: 3.2 cmAO Root  cm              LV Volume Diastolic: 219.0   Dimension: 3 cm  LV FS:21.2 %    ml  LV PW           LV Volume Systolic: 93.1 ml  Diastolic: 0.9  LV EDV/LV EDV Index: 130.8  cm              ml/73 ml/m^2LV ESV/LV ESV    RV Diastolic Dimension: 3.1  LV PW Systolic: Index: 98.6 RY/76TY/ m^2     cm  1.6 cm          EF Calculated: 43.5 %  Septum          LV Mass Index: 108 l/min*m^2 LA/Aorta: 6.65  Diastolic: 1.1  LV Length: 9.6 cm            Ascending Aorta: 3 cm  cm                                           LA volume/Index: 35.5 ml  Septum          LVOT: 2 cm                   /21QD/T^6  Systolic: 1.1  cm  CO: 5.57 l/min                               IVC Expiration: 1.1 cm  CI: 2.82  l/m*m^2  LV Mass: 194.16  g  Doppler Measurements & Calculations   MV Peak E-Wave: 0.64 AV Peak Velocity:     LVOT Peak Velocity: 0.88 m/s  m/s                  1.15 m/s              LVOT Mean Velocity: 0.73 m/s  MV Peak A-Wave: 0.73 AV Peak Gradient:     LVOT Peak Gradient: 3.1  m/s                  5.32 mmHg             mmHgLVOT Mean Gradient: 2.3  MV E/A Ratio: 0.88   AV Mean Velocity:     mmHg  MV Peak Gradient:    0.82 m/s  3.2 mmHg             AV Mean Gradient: 3.2  MV Mean Gradient:    mmHg  1.4 mmHg             AV VTI: 22.5 cm       TR Velocity:2.28 m/s  MV Mean Velocity:    AV Area               TR Gradient:20.85 mmHg  0.55 m/s             (Continuity):2.81     PV Peak Velocity: 1.1 m/s  MV Deceleration      cm^2                  PV Peak Gradient: 4.86 mmHg  Time: 161.4 msec                           PV Mean Velocity: 0.79 m/s  MV P1/2t: 37.6 msec  LVOT VTI: 20.1 cm     PV Mean Gradient: 2.9 mmHg  MVA by PHT:5.85 cm^2  MV Area  (continuity): 2.8  cm^2  MV E' Septal  Velocity: 0.06 m/s  MV E' Lateral  Velocity: 11 m/s  [email or web address] [id number]    XR CHEST STANDARD (2 VW)    Result Date: 7/12/2022  EXAMINATION: THREE XRAY VIEWS OF THE THORACIC SPINE; 2 XRAY VIEWS OF THE CERVICAL SPINE; TWO XRAY VIEWS OF THE CHEST 7/12/2022 7:41 am COMPARISON: None. HISTORY: ORDERING SYSTEM PROVIDED HISTORY: Dorsalgia of thoracic region TECHNOLOGIST PROVIDED HISTORY: Reason for exam:->mid back pain; ORDERING SYSTEM PROVIDED HISTORY: Cervical radiculopathy TECHNOLOGIST PROVIDED HISTORY: Reason for exam:->back pain, neck pain radiculopathy; ORDERING SYSTEM PROVIDED HISTORY: Dorsalgia of thoracic region TECHNOLOGIST PROVIDED HISTORY: Reason for exam:->mid back pain for 2-3 weeks pain down left arm FINDINGS: Chest: A mass measuring approximately 5 cm in diameter is located posterior to the right pulmonary hilum and is highly suspicious for neoplasm. Contrast-enhanced CT of the chest is recommended for further evaluation. Normal heart and pulmonary vascularity. Neither costophrenic angle is blunted. C-spine: Moderate C6-7 degenerative disc disease. No fracture or dislocation. Normal soft tissues. T-spine: Moderate multilevel degenerative disc disease. No fracture or dislocation. Right lung mass projecting posterior to the right pulmonary hilum which is quite suspicious for underlying neoplasm. Contrast-enhanced CT of the chest is recommended. Degenerative cervical and thoracic spondylosis. XR CERVICAL SPINE (2-3 VIEWS)    Result Date: 7/12/2022  EXAMINATION: THREE XRAY VIEWS OF THE THORACIC SPINE; 2 XRAY VIEWS OF THE CERVICAL SPINE; TWO XRAY VIEWS OF THE CHEST 7/12/2022 7:41 am COMPARISON: None.  HISTORY: ORDERING SYSTEM PROVIDED HISTORY: Dorsalgia of thoracic region TECHNOLOGIST PROVIDED HISTORY: Reason for exam:->mid back pain; ORDERING SYSTEM PROVIDED HISTORY: Cervical radiculopathy TECHNOLOGIST PROVIDED HISTORY: Reason for exam:->back pain, neck pain radiculopathy; ORDERING SYSTEM PROVIDED HISTORY: Dorsalgia of thoracic region TECHNOLOGIST PROVIDED HISTORY: Reason for exam:->mid back pain for 2-3 weeks pain down left arm FINDINGS: Chest: A mass measuring approximately 5 cm in diameter is located posterior to the right pulmonary hilum and is highly suspicious for neoplasm. Contrast-enhanced CT of the chest is recommended for further evaluation. Normal heart and pulmonary vascularity. Neither costophrenic angle is blunted. C-spine: Moderate C6-7 degenerative disc disease. No fracture or dislocation. Normal soft tissues. T-spine: Moderate multilevel degenerative disc disease. No fracture or dislocation. Right lung mass projecting posterior to the right pulmonary hilum which is quite suspicious for underlying neoplasm. Contrast-enhanced CT of the chest is recommended. Degenerative cervical and thoracic spondylosis. XR THORACIC SPINE (3 VIEWS)    Result Date: 7/12/2022  EXAMINATION: THREE XRAY VIEWS OF THE THORACIC SPINE; 2 XRAY VIEWS OF THE CERVICAL SPINE; TWO XRAY VIEWS OF THE CHEST 7/12/2022 7:41 am COMPARISON: None. HISTORY: ORDERING SYSTEM PROVIDED HISTORY: Dorsalgia of thoracic region TECHNOLOGIST PROVIDED HISTORY: Reason for exam:->mid back pain; ORDERING SYSTEM PROVIDED HISTORY: Cervical radiculopathy TECHNOLOGIST PROVIDED HISTORY: Reason for exam:->back pain, neck pain radiculopathy; ORDERING SYSTEM PROVIDED HISTORY: Dorsalgia of thoracic region TECHNOLOGIST PROVIDED HISTORY: Reason for exam:->mid back pain for 2-3 weeks pain down left arm FINDINGS: Chest: A mass measuring approximately 5 cm in diameter is located posterior to the right pulmonary hilum and is highly suspicious for neoplasm.  Contrast-enhanced CT of the chest is recommended for further evaluation. Normal heart and pulmonary vascularity. Neither costophrenic angle is blunted. C-spine: Moderate C6-7 degenerative disc disease. No fracture or dislocation. Normal soft tissues. T-spine: Moderate multilevel degenerative disc disease. No fracture or dislocation. Right lung mass projecting posterior to the right pulmonary hilum which is quite suspicious for underlying neoplasm. Contrast-enhanced CT of the chest is recommended. Degenerative cervical and thoracic spondylosis. CT HEAD WO CONTRAST    Result Date: 7/18/2022  EXAMINATION: CT OF THE HEAD WITHOUT CONTRAST  7/18/2022 12:04 pm TECHNIQUE: CT of the head was performed without the administration of intravenous contrast. Automated exposure control, iterative reconstruction, and/or weight based adjustment of the mA/kV was utilized to reduce the radiation dose to as low as reasonably achievable. COMPARISON: July 13, 1022 brain MRI. HISTORY: ORDERING SYSTEM PROVIDED HISTORY: altered mental status TECHNOLOGIST PROVIDED HISTORY: Has a \"code stroke\" or \"stroke alert\" been called? ->No Reason for exam:->altered mental status FINDINGS: BRAIN/VENTRICLES: Multiple enhancing lesions noted on previous contrast enhanced brain MRI from July 13, 2020 are not as well visualized on this noncontrast study. There is a new large area of low attenuation in the right temporoparietal occipital region. Additional foci of low attenuation in the left occipital lobe and right frontal lobe. No abnormal extra-axial fluid collection. There is no evidence of hydrocephalus. ORBITS: The visualized portion of the orbits demonstrate no acute abnormality. SINUSES: The visualized paranasal sinuses and mastoid air cells demonstrate no acute abnormality. SOFT TISSUES/SKULL:  No acute abnormality of the visualized skull or soft tissues. There are lytic lesions in the frontal calvarium, compatible with previously assessed metastatic deposits. Multifocal areas of low attenuation as follows: A new large area in the right temporoparietal occipital region (highly suspicious for acute infarct), 2.2 cm focus in the left occipital lobe (corresponds to previously noted area of focal encephalomalacia), and 2.9 cm focus (corresponding to previously noted enhancing mass). There is no significant midline shift. Multiple lytic lesions in the frontal calvarium, compatible with previous assessment of skeletal metastases. Further evaluation with contrast enhanced brain MRI and/or perfusion/diffusion study commended. Critical results were called by Dr. Madalyn Aviles to Dr. Jorge Davenport  on 7/18/2022 at 12:29 p.m.     CT CHEST W CONTRAST    Addendum Date: 7/13/2022    ADDENDUM: A request was made by the referring physician to assess whether there is pulmonary embolus are not. The study is limited to fully assess for pulmonary emboli, since there is suboptimal opacification of the pulmonary arteries and their tributaries. Regardless, there is no evidence to suggest pulmonary embolus. Result Date: 7/13/2022  EXAMINATION: CT OF THE CHEST WITH CONTRAST 7/12/2022 2:17 pm TECHNIQUE: CT of the chest was performed with the administration of intravenous contrast. Multiplanar reformatted images are provided for review. Automated exposure control, iterative reconstruction, and/or weight based adjustment of the mA/kV was utilized to reduce the radiation dose to as low as reasonably achievable. COMPARISON: None. HISTORY: ORDERING SYSTEM PROVIDED HISTORY: mass on XR TECHNOLOGIST PROVIDED HISTORY: Reason for exam:->mass on XR Decision Support Exception - unselect if not a suspected or confirmed emergency medical condition->Emergency Medical Condition (MA) FINDINGS: Mediastinum: Multiple, enlarged mediastinal lymph nodes are noted involving the paratracheal and subcarinal spaces. The largest lymph node is identified in the subcarinal space, measuring 1.8 x 1.6 cm in size.   This indicates metastatic disease. There is a conglomeration of enlarged right hilar and suprahilar lymph nodes, measuring 3.6 x 2.9 cm in size. This also indicates metastatic disease. No left hilar or axillary lymph nodes are present. The thoracic aorta is minimally atherosclerotic. The remainder of the visualized pulmonary vasculature is unremarkable. The heart is within normal limits in size. The thyroid gland and esophagus are unremarkable. A small amount of decreased attenuation is identified within the anterior trachea, probably representing mucous. Lungs/pleura: There is a large, necrotic soft tissue mass identified in the right lower lobe, which also encompasses the inferior right hilum. It measures 9.2 x 5.6 cm in size. It is spiculated in appearance. This is indicative of malignancy. Subcentimeter satellite nodules are noted in the right lower lobe, posterior to the previously described mass. A multitude of tiny centrilobular nodules and densities suspicious for tree-in-bud opacities are identified in the right middle lobe. Similar, but much less significant findings are noted in the superior segment of the right lower lobe. This could represent infective bronchiolitis or other inflammatory disorder, however, the presence of tumoral spread cannot be excluded. A 0.7 cm ill-defined nodule is seen in the right upper lobe (image 42 and series 302). A smaller nodule is noted in the left upper lobe (image 56 on series 302). Punctate nodules are seen in the left lower lobe (images 97 and 120 on series 302). It cannot be determined if all these nodules are neoplastic or inflammatory. A 0.2 cm perifissural lymph node is noted along the left major fissure. No other pulmonary parenchymal nodule or mass is seen. There is no evidence of acute consolidation or infiltrate. No active pleural disease is identified. Centrilobular emphysema is noted.  Upper Abdomen: Findings discussed in full detail on the report from the patient's CT scan of the abdomen and pelvis performed the same day. Please refer to that transcription. Soft Tissues/Bones: Expansile, lytic lesions are noted involving the right 2nd and 9th ribs, as well as the left 3rd rib. The largest is noted involving the right 2nd rib and measures 3.1 x 2.9 cm. This is consistent with osseous metastatic disease. Other, lytic lesions are noted involving multiple cervical, thoracic, and lumbar spine vertebral bodies. 1.  Large, necrotic soft tissue mass in the right lower lobe, which also encompasses the inferior right hilum, consistent with malignancy. 2.  Metastatic mediastinal and right hilar lymphadenopathy. 3.  Tiny satellite nodules in the right lower lobe, posterior to the previously described mass. 4.  Multitude of tiny centrilobular nodules and density suspicious for tree-in-bud opacities in the right middle lobe. Similar, but much less significant findings are seen in the superior segment of the right lower lobe. Question infective bronchiolitis or other inflammatory disorder. Tumoral spread cannot be excluded. 5.  Bilateral lung nodules, as described above. It cannot be determined if these nodules are neoplastic or inflammatory. 6.  Centrilobular emphysema. 7.  Abdominal findings discussed in full detail on the report from the patient's CT scan of the abdomen and pelvis performed the same day. Please refer to that transcription. 8.  Osseous metastatic disease. CT GUIDED NEEDLE PLACEMENT    Result Date: 7/14/2022  PROCEDURE: CT NEEDLE BIOPSY LIVER PERCUTANEOUS; CT GUIDED NDL PLACEMENT 7/14/2022 HISTORY: ORDERING SYSTEM PROVIDED HISTORY: mass TECHNOLOGIST PROVIDED HISTORY: Reason for exam:->mass Multiple hepatic lesions. TECHNIQUE: The patient was placed in the supine position and multiple unenhanced axial images of the liver were performed.  Automated exposure control, iterative reconstruction, and/or weight based adjustment of the mA/kV was utilized to reduce the radiation dose to as low as reasonably achievable. CONTRAST: None SEDATION: None FLUOROSCOPY DOSE AND TYPE OR TIME AND EXPOSURES: Total CT fluoroscopy time 6.4 seconds. Total exam .79 mGy-cm. Number of images: 957 ANNJFFKGFAD OF PROCEDURE: Informed consent was obtained after a detailed explanation of the procedure including risks, benefits, and alternatives. Universal protocol was observed. Sterile gowns, masks, hats and gloves utilized for maximal sterile barrier. Time-out was called and the patient's order and identity were verified. Under sterile conditions and CT guidance, a 17 gauge guiding needle was placed into the anterior aspect of a lesion in the medial segment of the left hepatic lobe. 4 core biopsy specimens were obtained with an 18 gauge biopsy gun. The patient tolerated the procedure well. FINDINGS: Initial images demonstrate multiple low-density lesions in the liver. Intra procedural images demonstrate good needle position at the anterior aspect of the lesion in the medial segment of the left hepatic lobe. Post biopsy images show no significant hemorrhage about the biopsy site. Status post CT-guided biopsy of lesion in the left lobe of the liver. CT ABDOMEN PELVIS W IV CONTRAST Additional Contrast? None    Result Date: 7/12/2022  EXAMINATION: CT OF THE ABDOMEN AND PELVIS WITH CONTRAST 7/12/2022 2:17 pm TECHNIQUE: CT of the abdomen and pelvis was performed with the administration of intravenous contrast. Multiplanar reformatted images are provided for review. Automated exposure control, iterative reconstruction, and/or weight based adjustment of the mA/kV was utilized to reduce the radiation dose to as low as reasonably achievable. COMPARISON: None.  HISTORY: ORDERING SYSTEM PROVIDED HISTORY: mass on CXR, back pain TECHNOLOGIST PROVIDED HISTORY: Additional Contrast?->None Reason for exam:->mass on CXR, back pain Decision Support Exception - unselect if not a suspected or confirmed emergency medical condition->Emergency Medical Condition (MA) FINDINGS: Lower Chest: There is a large, necrotic soft tissue mass seen in the right lower lobe, which also encompasses the inferior right hilum. It measures 9.2 x 5.6 cm in size. It is spiculated in appearance. The finding is indicative of malignancy. Further discussion can be found on the report from the patient's CT scan of the chest performed the same time. There is eventration of the posterior left hemidiaphragm Organs: Multiple, solid, small, hypodense lesions are identified throughout the liver. The largest measures 2.4 cm in diameter. This is consistent with metastatic disease. Along the superior aspects of the spleen, there is a poorly defined area of hypodensity, measuring 4.4 by 2.6 by 2.7 cm in size. The hypodensity extends to the capsule. This most likely represents a splenic infarct. The gallbladder, biliary tree, and pancreas are unremarkable. A 1.3 x 0.9 cm hypodensity is seen within the right adrenal gland. The Hounsfield units range from 9-22. This could represent an adenoma, however, the presence of metastatic disease cannot be excluded. 2 solid-appearing, small left adrenal gland lesions are identified, with the largest measuring 1.5 x 1.2 cm. Same differential diagnosis exists. MRI of the adrenal glands can be considered for further evaluation. Bilateral renal cortical cysts are noted, of which the largest is noted in the lower pole of the right kidney, measuring 2.1 x 1.9 cm in size. Multiple areas of ill-defined decreased attenuation are noted within both renal parenchyma, left greater than right. The 2 largest areas are also noted within the left kidney. The 1 in the upper pole measures 3.1 x 3.3 cm. The one in the midpole measures 3.4 x 3.3 cm. There is no evidence of a cortical rim sign.   Given the presence of the splenic infarct, these could represent bilateral renal infarcts without a cortical rim sign, however, the presence of pyelonephritis cannot be excluded. Metastatic disease is also included in the differential diagnosis. Further evaluation needs to be based on clinical grounds. GI/Bowel: The stomach is suboptimally distended, but grossly unremarkable. The small bowel is unremarkable for a non oral contrast study. Multiple diverticula are noted involving the sigmoid and descending colon. A moderate amount of retained stool is noted throughout the colon. The appendix is not definitely visualized. Pelvis: The urinary bladder is distended and unremarkable. The seminal vesicles are symmetric in size. The prostate gland is enlarged. Peritoneum/Retroperitoneum: A mildly enlarged gastrohepatic lymph node is noted, measuring 1.7 x 1.1 cm in size. No retroperitoneal or pelvic lymphadenopathy is identified. No free fluid is seen in the abdomen and pelvis. No abdominal or pelvic soft tissue mass is noted. The abdominal aorta is atherosclerotic. Bones/Soft Tissues: A small, expansile, lytic lesion is noted involving the posterolateral right 9th rib. Multiple lytic lesions are identified throughout the visualized thoracolumbar spine, as well as sacrum and iliac bones expansile lesions with cortical breakthrough are identified involving the right iliac bone, as well as left sacral ala. These is consistent with metastatic disease. 1.  Large, necrotic soft tissue mass in the right lower lobe of the lungs, which in Compazine is the inferior right hilum. This is described in full detail on the report from the patient's CT scan of the chest performed the same day. Please refer to that transcription. 2.  Liver metastases. 3.  A splenic infarct. 4.  Bilateral adrenal gland lesions, which may represent adenomata. Metastatic disease cannot be excluded. MRI of the adrenal glands can be considered for further evaluation. 5.  Bilateral renal cortical cysts.  6.  Hypodense areas involving both renal parenchyma, left greater than right. These could represent bilateral infarcts without cortical rim sign, however, pyelonephritis cannot be excluded. Metastatic disease is also in the differential diagnosis. Clinical correlation is recommended. 7.  Colonic diverticulosis. 8.  Enlarged prostate gland. 9.  Mildly enlarged gastrohepatic lymph node. 10.  Osseous metastases, as described above. XR CHEST PORTABLE    Result Date: 7/18/2022  EXAMINATION: ONE XRAY VIEW OF THE CHEST 7/18/2022 12:05 pm COMPARISON: Previous CT scan of the chest dated 07/12/2022 HISTORY: ORDERING SYSTEM PROVIDED HISTORY: fatigue TECHNOLOGIST PROVIDED HISTORY: Reason for exam:->fatigue FINDINGS: The cardiac silhouette is within normals. There is a mass seen within the right hilum. The finding is unchanged compared to prior CT scan. The left lung is clear. There are no findings of pneumonia. 1. No interval change in the large right perihilar mass 2. There are no findings of pneumonia within the right upper lobe or left lung. CT NEEDLE BIOPSY LIVER PERCUTANEOUS    Result Date: 7/14/2022  PROCEDURE: CT NEEDLE BIOPSY LIVER PERCUTANEOUS; CT GUIDED NDL PLACEMENT 7/14/2022 HISTORY: ORDERING SYSTEM PROVIDED HISTORY: mass TECHNOLOGIST PROVIDED HISTORY: Reason for exam:->mass Multiple hepatic lesions. TECHNIQUE: The patient was placed in the supine position and multiple unenhanced axial images of the liver were performed. Automated exposure control, iterative reconstruction, and/or weight based adjustment of the mA/kV was utilized to reduce the radiation dose to as low as reasonably achievable. CONTRAST: None SEDATION: None FLUOROSCOPY DOSE AND TYPE OR TIME AND EXPOSURES: Total CT fluoroscopy time 6.4 seconds. Total exam .79 mGy-cm. Number of images: 634 HWNMFTIVEOC OF PROCEDURE: Informed consent was obtained after a detailed explanation of the procedure including risks, benefits, and alternatives. Universal protocol was observed. Sterile gowns, masks, hats and gloves utilized for maximal sterile barrier. Time-out was called and the patient's order and identity were verified. Under sterile conditions and CT guidance, a 17 gauge guiding needle was placed into the anterior aspect of a lesion in the medial segment of the left hepatic lobe. 4 core biopsy specimens were obtained with an 18 gauge biopsy gun. The patient tolerated the procedure well. FINDINGS: Initial images demonstrate multiple low-density lesions in the liver. Intra procedural images demonstrate good needle position at the anterior aspect of the lesion in the medial segment of the left hepatic lobe. Post biopsy images show no significant hemorrhage about the biopsy site. Status post CT-guided biopsy of lesion in the left lobe of the liver. US DUP LOWER EXTREMITY LEFT GENA    Result Date: 7/13/2022  EXAMINATION: DUPLEX VENOUS ULTRASOUND OF THE LEFT LOWER EXTREMITY 7/13/2022 7:33 pm TECHNIQUE: Duplex ultrasound using B-mode/gray scaled imaging and Doppler spectral analysis and color flow was obtained of the deep venous structures of the left lower extremity. COMPARISON: None. HISTORY: ORDERING SYSTEM PROVIDED HISTORY: assess dvt TECHNOLOGIST PROVIDED HISTORY: Reason for exam:->assess dvt What reading provider will be dictating this exam?->CRC FINDINGS: There is occlusive thrombus demonstrated at level of mid left calf appearing within left soleal vein. There is also non compressibility at this level suggesting deep vein thrombosis. Remaining veins of left lower extremity show normal compressibility and Doppler blood flow. Findings are suggestive of occlusive deep vein thrombosis including left soleal vein. Remaining veins of left lower extremity show no evidence of deep vein thrombosis.      MRI BRAIN W WO CONTRAST    Result Date: 7/14/2022  EXAMINATION: MRI OF THE BRAIN WITHOUT AND WITH CONTRAST  7/13/2022 9:51 pm TECHNIQUE: Multiplanar multisequence MRI of the head/brain was performed without and with the administration of intravenous contrast. COMPARISON: None. HISTORY: ORDERING SYSTEM PROVIDED HISTORY: Stage IV lung cancer, complete staging TECHNOLOGIST PROVIDED HISTORY: Reason for exam:->Stage IV lung cancer, compelte staging FINDINGS: INTRACRANIAL STRUCTURES/VENTRICLES:  There are few small foci of diffusion restriction involving the bilateral frontal, parietal, and occipital lobes. No mass effect or midline shift. No evidence of an acute intracranial hemorrhage. The ventricles and sulci are normal in size and configuration. The sellar/suprasellar regions appear unremarkable. The normal signal voids within the major intracranial vessels appear maintained. Periventricular and subcortical white matter T2/FLAIR hyperintensities, consistent microangiopathic change. There are multiple enhancing intraparenchymal lesions. The largest of these measures 2 x 1.6 cm in the left frontal lobe. There is some associated vasogenic edema, mass effect, and sulcal effacement. No midline shift. Lesions also involve the more superior left frontal cortex, right frontal lobe, medial left occipital lobe, and right cerebellar hemisphere. ORBITS: The visualized portion of the orbits demonstrate no acute abnormality. SINUSES: Scattered mucosal thickening in the paranasal sinuses. The mastoid air cells are clear. BONES/SOFT TISSUES: There are enhancing lesions in the midline and left frontal calvarium. 1. There are a few small scattered foci of diffusion restriction in the bilateral supratentorial brain, concerning for small areas of infarction. This is likely embolic given the distribution. 2. Multiple enhancing intraparenchymal lesions, consistent with intracranial metastatic disease. There is associated edema and mass effect in the left frontal lobe without midline shift. 3. Enhancing lesions in the frontal calvarium, concerning for osseous metastatic disease.        Discharge Medications:      Medication List        CHANGE how you take these medications      memantine 10 MG tablet  Commonly known as: Namenda  Take 1 tablet by mouth in the morning and 1 tablet before bedtime. Starting on week 4. What changed: Another medication with the same name was removed. Continue taking this medication, and follow the directions you see here. CONTINUE taking these medications      apixaban 5 MG Tabs tablet  Commonly known as: Eliquis  Take 1 tablet by mouth in the morning and 1 tablet before bedtime. dexamethasone 4 MG tablet  Commonly known as: DECADRON  Take 1 tablet by mouth in the morning and 1 tablet in the evening. Take with meals. Do all this for 14 days. HYDROcodone-acetaminophen 5-325 MG per tablet  Commonly known as: Norco  Take 1 tablet by mouth every 6 hours as needed for Pain for up to 7 days. Intended supply: 7 days. Take lowest dose possible to manage pain     levETIRAcetam 500 MG tablet  Commonly known as: KEPPRA  Take 1 tablet by mouth in the morning and 1 tablet before bedtime.      polyethylene glycol 17 g packet  Commonly known as: GLYCOLAX  Take 17 g by mouth daily as needed for Constipation            STOP taking these medications      ibuprofen 800 MG tablet  Commonly known as: ADVIL;MOTRIN               Where to Get Your Medications        These medications were sent to [facility], [address] [address]      Phone: [phone]   dexamethasone 4 MG tablet         Time Spent on discharge is more than 45 minutes in the examination, evaluation, counseling and review of medications and discharge plan.    +++++++++++++++++++++++++++++++++++++++++++++++++  Germania Anderson MD  [address]  +++++++++++++++++++++++++++++++++++++++++++++++++  NOTE: This report was transcribed using voice recognition software. Every effort was made to ensure accuracy; however, inadvertent computerized transcription errors may be present.

## 2022-07-21 NOTE — PROGRESS NOTES
Palliative Care Department  [phone]  Palliative Care Progress Note  Provider Marquis Quesada, APRN - CNP      PATIENT: Rose Lopez  : 1960  MRN: [id number]  ADMISSION DATE: 2022  9:19 AM  Referring Provider: Larissa Caballero MD    Palliative Medicine was consulted on hospital day 2 for assistance with Goals of care, Code Status Discussion, Family support. HPI:     Kushal Giraldo is a 62 y.o. y/o male with a history of other mental status, back pain, elevated troponin, atypical angina, lung mass, lung cancer, metastatic malignant neoplasm, shortness of breath who presented to [facility]) on 2022 from [facility] for further evaluation of abnormal CT of the head, highly suspicious for new stroke needing neurological evaluation. Patient was admitted for altered mental status. Patient had a history of recent discharge from [facility] after extensive work-up for lung mass with metastatic disease, stroke, and DVT. CT-guided biopsy taken on 2022 with results are pending. At the emergency room, CT of the head shows multiple areas of low attenuation with a new large area in the right temporal parietal occipital region highly suspicious for infarct. Multiple lytic lesions in the frontal calvarium. ASSESSMENT/PLAN:     Pertinent Hospital Diagnoses     Altered mental status  Right lower lung mass with metastasis disease    Palliative Care Encounter / Counseling Regarding Goals of Care  Please see detailed goals of care discussion as below  At this time, Rose Lopez, Does have capacity for medical decision-making.   Capacity is time limited and situation/question specific  During encounter Patient and wife Prince Husain jointly participated in medical decision-maker  Outcome of goals of care meeting:  Change code status to [facility]  Make appointment to PM OP clinic  Meet with oncology on Monday as OP to discuss treatment options  Code status Full Code  Advanced Directives: no POA or living will in [facility]  Surrogate/Legal NOK:  Julee Sánchez (Spouse) [phone]    Spiritual assessment: no spiritual distress identified  Bereavement and grief: to be determined  Referrals to: none today    Thank you for the opportunity to participate in the care of [employer]. [facility]  Palliative Medicine     SUBJECTIVE:     Details of Conversation:     Chart reviewed. Update received from nursing. Patient seen Yaneth Sharma in bed with wife at bedside. Alert to self with intermittent confusion. Unable to hold meaningful conversation regarding goals of care and code status. Wife states she would like to take her  home. Appointment is made to see oncology Monday as OP to discuss treatment options. She states she is going to do whatever her  wants. She is okay with hospice and comfort care if that is what he chooses. Discussion on following with PM OP clinic. Wife agrees. Appointment made. In-depth discussion on code status options. After further discussion wife states she would like to change CODE STATUS to DNR CCA. Emotional support given and all questions addressed. Prognosis: Guarded    OBJECTIVE:     /72   Pulse 70   Temp 98 °F (36.7 °C)   Resp 16   SpO2 95%     Physical Examination:  Gen: thin, awake, alert, flat affect  Lungs: respirations easy and not labored,   Heart: regular rate and rhythm, distant heart tones,   Abdomen: normoactive bowel sounds, soft, non-tender  Extremities: no clubbing, cyanosis or edema, moving all extremities    Skin: warm, dry without rashes, lesions, bruising  Neuro: awake, alert, confused    Objective data reviewed: labs, images, records, medication use, vitals, and chart    Time/Communication  Greater than 50% of time spent, total 15 minutes in counseling and coordination of care at the bedside regarding  CODE STATUS, family support and goals of care.     Thank you for allowing Palliative Medicine to participate in the renetta of [facility]. Note: This report was completed using [employer] voiced recognition software. Every effort has been made to ensure accuracy; however, inadvertent computerized transcription errors may be present.

## 2022-07-21 NOTE — PROGRESS NOTES
CLINICAL PHARMACY NOTE: MEDS TO BEDS    Total # of Prescriptions Filled: 1   The following medications were delivered to the patient:  Dexamethasone 4    Additional Documentation:   Pharmacy

## 2022-07-21 NOTE — PLAN OF CARE
Problem: Discharge Planning  Goal: Discharge to home or other facility with appropriate resources  Outcome: Adequate for Discharge  Flowsheets (Taken 7/21/2022 9302)  Discharge to home or other facility with appropriate resources: Identify barriers to discharge with patient and caregiver     Problem: Safety - Adult  Goal: Free from fall injury  Outcome: Adequate for Discharge     Problem: Skin/Tissue Integrity  Goal: Absence of new skin breakdown  Description: 1. Monitor for areas of redness and/or skin breakdown  2. Assess vascular access sites hourly  3. Every 4-6 hours minimum:  Change oxygen saturation probe site  4. Every 4-6 hours:  If on nasal continuous positive airway pressure, respiratory therapy assess nares and determine need for appliance change or resting period.   Outcome: Adequate for Discharge     Problem: ABCDS Injury Assessment  Goal: Absence of physical injury  Outcome: Adequate for Discharge

## 2022-07-26 ENCOUNTER — TELEPHONE (OUTPATIENT)
Dept: SURGERY | Age: 62
End: 2022-07-26

## 2022-07-26 NOTE — TELEPHONE ENCOUNTER
Prior Authorization Form:      DEMOGRAPHICS:                     Patient Name:  Renea Hess  Patient :  1960            Insurance:  Payor: Juan Hubbard / Plan: Juan Hubbard [place] LOCAL / Product Type: *No Product type* /   Insurance ID Number:    Payer/Plan Subscr  Sex Relation Sub. Ins. ID Effective Group Num   1.  [employer] Linh Silva 1969 Female Spouse [id number]                                    [id number]         DIAGNOSIS & PROCEDURE:                       Procedure/Operation: MEDIPORT INSERT           CPT Code: 99445    Diagnosis:  An Rung CA    ICD10 Code: C34.90    Location:  [facility]    Surgeon:  Jose Francisco Marcial INFORMATION:                          Date: 2022    Time: 10:42              Anesthesia:  MAC/TIVA                                                       Status:  Outpatient        Special Comments:         Electronically signed by Angela Sidhu MA on 2022 at 2:01 PM

## 2022-07-29 ENCOUNTER — HOSPITAL ENCOUNTER (OUTPATIENT)
Dept: RADIATION ONCOLOGY | Age: 62
Discharge: HOME | End: 2022-07-29
Payer: COMMERCIAL

## 2022-07-29 ENCOUNTER — HOSPITAL ENCOUNTER (OUTPATIENT)
Dept: CT IMAGING | Age: 62
Discharge: HOME | End: 2022-07-31

## 2022-07-29 PROCEDURE — 77334 RADIATION TREATMENT AID(S): CPT | Performed by: RADIOLOGY

## 2022-08-02 ENCOUNTER — HOSPITAL ENCOUNTER (OUTPATIENT)
Dept: RADIATION ONCOLOGY | Age: 62
Discharge: HOME | End: 2022-08-02
Payer: COMMERCIAL

## 2022-08-02 PROCEDURE — 77307 TELETHX ISODOSE PLAN CPLX: CPT | Performed by: RADIOLOGY

## 2022-08-02 PROCEDURE — 77334 RADIATION TREATMENT AID(S): CPT | Performed by: RADIOLOGY

## 2022-08-04 LAB
Lab: NORMAL
THIS TEST SENT TO: NORMAL